# Patient Record
Sex: MALE | Race: OTHER | NOT HISPANIC OR LATINO | Employment: OTHER | URBAN - METROPOLITAN AREA
[De-identification: names, ages, dates, MRNs, and addresses within clinical notes are randomized per-mention and may not be internally consistent; named-entity substitution may affect disease eponyms.]

---

## 2017-07-25 ENCOUNTER — DOCUMENTATION ONLY (OUTPATIENT)
Dept: FAMILY MEDICINE | Facility: CLINIC | Age: 57
End: 2017-07-25

## 2017-07-25 NOTE — PROGRESS NOTES
Pre-Visit Chart Review  For Appointment Scheduled on 7-    There are no preventive care reminders to display for this patient.

## 2017-07-26 ENCOUNTER — HOSPITAL ENCOUNTER (OUTPATIENT)
Dept: RADIOLOGY | Facility: CLINIC | Age: 57
Discharge: HOME | End: 2017-07-26
Attending: NURSE PRACTITIONER
Payer: MEDICARE

## 2017-07-26 ENCOUNTER — OFFICE VISIT (OUTPATIENT)
Dept: FAMILY MEDICINE | Facility: CLINIC | Age: 57
End: 2017-07-26
Payer: MEDICARE

## 2017-07-26 VITALS
WEIGHT: 296.31 LBS | SYSTOLIC BLOOD PRESSURE: 117 MMHG | HEART RATE: 88 BPM | HEIGHT: 69 IN | TEMPERATURE: 99 F | DIASTOLIC BLOOD PRESSURE: 78 MMHG | BODY MASS INDEX: 43.89 KG/M2

## 2017-07-26 DIAGNOSIS — R20.0 NUMBNESS OF LEFT FOOT: ICD-10-CM

## 2017-07-26 DIAGNOSIS — M79.675 PAIN OF TOE OF LEFT FOOT: ICD-10-CM

## 2017-07-26 DIAGNOSIS — R73.9 HYPERGLYCEMIA: Primary | ICD-10-CM

## 2017-07-26 DIAGNOSIS — M79.675 PAIN OF TOE OF LEFT FOOT: Primary | ICD-10-CM

## 2017-07-26 DIAGNOSIS — G62.9 NEUROPATHY: ICD-10-CM

## 2017-07-26 PROCEDURE — 73630 X-RAY EXAM OF FOOT: CPT | Mod: 26,LT,S$GLB, | Performed by: RADIOLOGY

## 2017-07-26 PROCEDURE — 73630 X-RAY EXAM OF FOOT: CPT | Mod: TC,PO,LT

## 2017-07-26 PROCEDURE — 99999 PR PBB SHADOW E&M-EST. PATIENT-LVL III: CPT | Mod: PBBFAC,,, | Performed by: NURSE PRACTITIONER

## 2017-07-26 PROCEDURE — 99203 OFFICE O/P NEW LOW 30 MIN: CPT | Mod: S$PBB,,, | Performed by: NURSE PRACTITIONER

## 2017-07-26 RX ORDER — FLUNISOLIDE 0.25 MG/ML
2 SOLUTION NASAL 2 TIMES DAILY
COMMUNITY
End: 2021-06-01

## 2017-07-26 RX ORDER — MELOXICAM 7.5 MG/1
7.5 TABLET ORAL DAILY PRN
Qty: 30 TABLET | Refills: 2 | Status: SHIPPED | OUTPATIENT
Start: 2017-07-26 | End: 2017-08-10 | Stop reason: SDUPTHER

## 2017-07-26 RX ORDER — OLOPATADINE HYDROCHLORIDE 665 UG/1
1 SPRAY NASAL 2 TIMES DAILY
COMMUNITY
End: 2021-10-29

## 2017-07-26 NOTE — PROGRESS NOTES
Subjective:       Patient ID: Elliott Ayala is a 57 y.o. male.    Chief Complaint: Foot Pain (left foot pain)    Mr. Ayala presents to the clinic today for burning pain to left fifth toe which began 10-14 days ago after walking the dog.  He slipped but did not fall during this walk.  He has noted some numbness to the left lateral foot as well.  No history of diabetes although this is present in family history.        Foot Pain   This is a new problem. The current episode started 1 to 4 weeks ago. The problem occurs constantly. The problem has been waxing and waning. Associated symptoms include arthralgias (left fifth toe) and numbness. Pertinent negatives include no abdominal pain, anorexia, chest pain, chills, coughing, fatigue, fever, joint swelling or weakness. The symptoms are aggravated by walking. He has tried NSAIDs for the symptoms. The treatment provided mild relief.     Review of Systems   Constitutional: Negative for chills, fatigue and fever.   Respiratory: Negative for cough and shortness of breath.    Cardiovascular: Negative for chest pain.   Gastrointestinal: Negative for abdominal pain and anorexia.   Musculoskeletal: Positive for arthralgias (left fifth toe). Negative for joint swelling.   Neurological: Positive for numbness. Negative for dizziness and weakness.       Objective:      Physical Exam   Constitutional: He is oriented to person, place, and time. He appears well-developed and well-nourished. No distress.   HENT:   Head: Normocephalic and atraumatic.   Right Ear: External ear normal.   Left Ear: External ear normal.   Eyes: Conjunctivae and EOM are normal.   Cardiovascular: Normal rate and regular rhythm.    Pulmonary/Chest: Effort normal.   Musculoskeletal: Normal range of motion.   Redness to left fifth toe, no warmth.  Decreased sensation to plantar surface of foot.  Mild tenderness to left lateral foot.   Neurological: He is alert and oriented to person, place, and time.   Skin: Skin  is warm and dry.   Psychiatric: He has a normal mood and affect. His behavior is normal.   Vitals reviewed.          Current Outpatient Prescriptions:     flunisolide 25 mcg, 0.025%, (NASALIDE) 25 mcg (0.025 %) Spry, 2 sprays by Nasal route 2 (two) times daily., Disp: , Rfl:     olopatadine (PATANASE) 0.6 % nasal spray, 1 spray by Each Nare route 2 (two) times daily., Disp: , Rfl:     meloxicam (MOBIC) 7.5 MG tablet, Take 1 tablet (7.5 mg total) by mouth daily as needed for Pain., Disp: 30 tablet, Rfl: 2  Assessment:       1. Pain of toe of left foot    2. Numbness of left foot    3. Neuropathy        Plan:       Pain of toe of left foot  Notify provider for no relief with medication.  Take medication with food.  No OTC NSAID's with Mobic.   Consider gabapentin if no relief with Mobic.  -     meloxicam (MOBIC) 7.5 MG tablet; Take 1 tablet (7.5 mg total) by mouth daily as needed for Pain.  Dispense: 30 tablet; Refill: 2  -     X-Ray Foot Complete Left; Future; Expected date: 07/26/2017  -     Vitamin B12; Future; Expected date: 07/26/2017  -     Folate; Future; Expected date: 07/26/2017  -     Uric acid; Future; Expected date: 07/26/2017  -     Comprehensive metabolic panel; Future; Expected date: 07/26/2017    Numbness of left foot  -     X-Ray Foot Complete Left; Future; Expected date: 07/26/2017  -     Vitamin B12; Future; Expected date: 07/26/2017  -     Folate; Future; Expected date: 07/26/2017  -     Comprehensive metabolic panel; Future; Expected date: 07/26/2017    Neuropathy  No qualifying diagnosis for HgA1c.  Will need to check if glucose elevated on CMP.  -     Vitamin B12; Future; Expected date: 07/26/2017  -     Folate; Future; Expected date: 07/26/2017  -     Comprehensive metabolic panel; Future; Expected date: 07/26/2017

## 2017-07-31 ENCOUNTER — LAB VISIT (OUTPATIENT)
Dept: LAB | Facility: HOSPITAL | Age: 57
End: 2017-07-31
Attending: NURSE PRACTITIONER
Payer: MEDICARE

## 2017-07-31 DIAGNOSIS — R73.9 HYPERGLYCEMIA: ICD-10-CM

## 2017-07-31 LAB
ESTIMATED AVG GLUCOSE: 114 MG/DL
HBA1C MFR BLD HPLC: 5.6 %

## 2017-07-31 PROCEDURE — 83036 HEMOGLOBIN GLYCOSYLATED A1C: CPT

## 2017-07-31 PROCEDURE — 36415 COLL VENOUS BLD VENIPUNCTURE: CPT | Mod: PO

## 2017-08-02 ENCOUNTER — TELEPHONE (OUTPATIENT)
Dept: FAMILY MEDICINE | Facility: CLINIC | Age: 57
End: 2017-08-02

## 2017-08-02 NOTE — TELEPHONE ENCOUNTER
----- Message from [address] sent at 8/1/2017 11:55 AM CDT -----  Contact: [phone]  Patient states he is returning the nurse phone call. Thanks!

## 2017-08-02 NOTE — TELEPHONE ENCOUNTER
----- Message from AYSHA Sheikh sent at 7/31/2017  4:45 PM CDT -----  Hemoglobin A1c is 5.6 consistent with no diabetes.  5.7 is prediabetes.

## 2017-08-08 ENCOUNTER — DOCUMENTATION ONLY (OUTPATIENT)
Dept: FAMILY MEDICINE | Facility: CLINIC | Age: 57
End: 2017-08-08

## 2017-08-08 NOTE — PROGRESS NOTES
Pre-Visit Chart Review  For Appointment Scheduled on 8/10/17.    Health Maintenance Due   Topic Date Due    Hepatitis C Screening  1960    Lipid Panel  1960    TETANUS VACCINE  04/22/1980    Colonoscopy  04/22/2012    Influenza Vaccine  08/01/2017

## 2017-08-10 ENCOUNTER — LAB VISIT (OUTPATIENT)
Dept: LAB | Facility: HOSPITAL | Age: 57
End: 2017-08-10
Attending: FAMILY MEDICINE
Payer: MEDICARE

## 2017-08-10 ENCOUNTER — OFFICE VISIT (OUTPATIENT)
Dept: FAMILY MEDICINE | Facility: CLINIC | Age: 57
End: 2017-08-10
Payer: MEDICARE

## 2017-08-10 VITALS
BODY MASS INDEX: 44.25 KG/M2 | TEMPERATURE: 98 F | HEART RATE: 99 BPM | DIASTOLIC BLOOD PRESSURE: 78 MMHG | HEIGHT: 69 IN | SYSTOLIC BLOOD PRESSURE: 130 MMHG | WEIGHT: 298.75 LBS | RESPIRATION RATE: 18 BRPM

## 2017-08-10 DIAGNOSIS — Z12.11 COLON CANCER SCREENING: ICD-10-CM

## 2017-08-10 DIAGNOSIS — Z00.00 ANNUAL PHYSICAL EXAM: Primary | ICD-10-CM

## 2017-08-10 DIAGNOSIS — M54.50 CHRONIC LEFT-SIDED LOW BACK PAIN WITHOUT SCIATICA: ICD-10-CM

## 2017-08-10 DIAGNOSIS — Z11.4 ENCOUNTER FOR SCREENING FOR HIV: ICD-10-CM

## 2017-08-10 DIAGNOSIS — M25.512 CHRONIC LEFT SHOULDER PAIN: ICD-10-CM

## 2017-08-10 DIAGNOSIS — E66.9 OBESITY: ICD-10-CM

## 2017-08-10 DIAGNOSIS — M79.675 PAIN OF TOE OF LEFT FOOT: ICD-10-CM

## 2017-08-10 DIAGNOSIS — Z00.00 ANNUAL PHYSICAL EXAM: ICD-10-CM

## 2017-08-10 DIAGNOSIS — Z29.9 PREVENTIVE MEASURE: ICD-10-CM

## 2017-08-10 DIAGNOSIS — G89.29 CHRONIC LEFT-SIDED LOW BACK PAIN WITHOUT SCIATICA: ICD-10-CM

## 2017-08-10 DIAGNOSIS — M79.672 LEFT FOOT PAIN: ICD-10-CM

## 2017-08-10 DIAGNOSIS — G89.29 CHRONIC LEFT SHOULDER PAIN: ICD-10-CM

## 2017-08-10 LAB
BASOPHILS # BLD AUTO: 0.03 K/UL
BASOPHILS NFR BLD: 0.5 %
CHOLEST/HDLC SERPL: 5.1 {RATIO}
DIFFERENTIAL METHOD: NORMAL
EOSINOPHIL # BLD AUTO: 0.1 K/UL
EOSINOPHIL NFR BLD: 1.8 %
ERYTHROCYTE [DISTWIDTH] IN BLOOD BY AUTOMATED COUNT: 13.4 %
HCT VFR BLD AUTO: 41.6 %
HDL/CHOLESTEROL RATIO: 19.8 %
HDLC SERPL-MCNC: 182 MG/DL
HDLC SERPL-MCNC: 36 MG/DL
HGB BLD-MCNC: 14.1 G/DL
LDLC SERPL CALC-MCNC: 113 MG/DL
LYMPHOCYTES # BLD AUTO: 2.1 K/UL
LYMPHOCYTES NFR BLD: 32 %
MCH RBC QN AUTO: 27.6 PG
MCHC RBC AUTO-ENTMCNC: 33.9 G/DL
MCV RBC AUTO: 82 FL
MONOCYTES # BLD AUTO: 0.5 K/UL
MONOCYTES NFR BLD: 8.2 %
NEUTROPHILS # BLD AUTO: 3.8 K/UL
NEUTROPHILS NFR BLD: 57.3 %
NONHDLC SERPL-MCNC: 146 MG/DL
PLATELET # BLD AUTO: 264 K/UL
PMV BLD AUTO: 10.7 FL
RBC # BLD AUTO: 5.1 M/UL
TRIGL SERPL-MCNC: 165 MG/DL
WBC # BLD AUTO: 6.56 K/UL

## 2017-08-10 PROCEDURE — 82306 VITAMIN D 25 HYDROXY: CPT

## 2017-08-10 PROCEDURE — 36415 COLL VENOUS BLD VENIPUNCTURE: CPT | Mod: PO

## 2017-08-10 PROCEDURE — 3008F BODY MASS INDEX DOCD: CPT | Mod: ,,, | Performed by: FAMILY MEDICINE

## 2017-08-10 PROCEDURE — 86803 HEPATITIS C AB TEST: CPT

## 2017-08-10 PROCEDURE — 80061 LIPID PANEL: CPT

## 2017-08-10 PROCEDURE — 99999 PR PBB SHADOW E&M-EST. PATIENT-LVL IV: CPT | Mod: PBBFAC,,, | Performed by: FAMILY MEDICINE

## 2017-08-10 PROCEDURE — 85025 COMPLETE CBC W/AUTO DIFF WBC: CPT

## 2017-08-10 PROCEDURE — 99214 OFFICE O/P EST MOD 30 MIN: CPT | Mod: S$PBB,,, | Performed by: FAMILY MEDICINE

## 2017-08-10 PROCEDURE — 86592 SYPHILIS TEST NON-TREP QUAL: CPT

## 2017-08-10 RX ORDER — MELOXICAM 7.5 MG/1
7.5 TABLET ORAL DAILY PRN
Qty: 30 TABLET | Refills: 1 | Status: SHIPPED | OUTPATIENT
Start: 2017-08-10 | End: 2018-08-17 | Stop reason: ALTCHOICE

## 2017-08-10 NOTE — PROGRESS NOTES
"[facility]  [facility]    Subjective:       Patient ID: Elliott Ayala is a 57 y.o. male.    Chief Complaint: Establish Care    HPI57 y.o.male with current medical history of osteoarthritis is here today to establish care.  Patient currently has complaints of left shoulder, hip, and foot pain.  Patient has pain in these areas on a daily basis.  Patient has been taking Mobic which has moderately reduced his symptoms of pain.  Patient symptoms of pain are made worse with weightbearing activities.  Patient has not participated in physical therapy.  Patient is overweight which could be contributing to his  ongoing joint pain.  No further complaints at today's visit.  Review of Systems   Constitutional: Negative for chills, fatigue and fever.   HENT: Negative for congestion, ear pain, postnasal drip, sinus pressure, sneezing and sore throat.    Eyes: Negative for pain.   Respiratory: Negative for cough, shortness of breath and wheezing.    Cardiovascular: Negative for chest pain, palpitations and leg swelling.   Gastrointestinal: Negative for abdominal distention, abdominal pain, constipation, diarrhea, nausea and vomiting.   Genitourinary: Negative for difficulty urinating.   Musculoskeletal: Positive for arthralgias and back pain. Negative for myalgias.   Skin: Negative for rash.   Neurological: Negative for dizziness, seizures, syncope, weakness and numbness.   Psychiatric/Behavioral: Negative for sleep disturbance. The patient is not nervous/anxious.        Objective:      Vitals:    08/10/17 1519   BP: 130/78   BP Location: Right arm   Patient Position: Sitting   BP Method: Medium (Automatic)   Pulse: 99   Resp: 18   Temp: 98.3 °F (36.8 °C)   TempSrc: Oral   Weight: 135.5 kg (298 lb 11.6 oz)   Height: 5' 9" (1.753 m)     Body mass index is 44.11 kg/m².  Physical Exam   Constitutional: He is oriented to person, place, and time. He appears well-developed and well-nourished.   HENT:   Head: Normocephalic " and atraumatic.   Eyes: Conjunctivae and EOM are normal. Pupils are equal, round, and reactive to light.   Neck: Normal range of motion. Neck supple. No JVD present.   Cardiovascular: Normal rate, regular rhythm, normal heart sounds and intact distal pulses.  Exam reveals no gallop and no friction rub.    No murmur heard.  Pulmonary/Chest: Effort normal and breath sounds normal. No respiratory distress. He has no wheezes.   Abdominal: Soft. Bowel sounds are normal. There is no tenderness.   Musculoskeletal: Normal range of motion.   Neurological: He is alert and oriented to person, place, and time. No cranial nerve deficit.   Skin: Skin is warm and dry.   Psychiatric: He has a normal mood and affect. His behavior is normal. Judgment and thought content normal.   Nursing note and vitals reviewed.      Assessment:       1. Annual physical exam    2. Preventive measure    3. Encounter for screening for HIV    4. Body mass index (BMI) of 40.0-44.9 in adult     5. Obesity     6. Chronic left-sided low back pain without sciatica    7. Left foot pain    8. Chronic left shoulder pain    9. Colon cancer screening    10. Pain of toe of left foot        Plan:       Annual physical exam  -     Vitamin D; Future; Expected date: 08/10/2017    Preventive measure  -     Hepatitis C antibody; Future; Expected date: 08/10/2017  -     Lipid panel; Future; Expected date: 08/10/2017  -     RPR; Future; Expected date: 08/10/2017    Encounter for screening for HIV  -     HIV-1 and HIV-2 antibodies; Future; Expected date: 08/10/2017    Body mass index (BMI) of 40.0-44.9 in adult   -     CBC auto differential; Future; Expected date: 08/10/2017  -     Cancel: Comprehensive metabolic panel; Future; Expected date: 08/10/2017  -     Lipid panel; Future; Expected date: 08/10/2017  -     Vitamin D; Future; Expected date: 08/10/2017    Obesity   -     Lipid panel; Future; Expected date: 08/10/2017    Chronic left-sided low back pain without  sciatica  -     Ambulatory Referral to Physical/Occupational Therapy    Left foot pain  -     Ambulatory Referral to Physical/Occupational Therapy    Chronic left shoulder pain  -     Ambulatory Referral to Physical/Occupational Therapy    Colon cancer screening  -     Ambulatory referral to Gastroenterology    Pain of toe of left foot  -     meloxicam (MOBIC) 7.5 MG tablet; Take 1 tablet (7.5 mg total) by mouth daily as needed for Pain.  Dispense: 30 tablet; Refill: 1        Return in about 2 months (around 10/10/2017).  Cameron Hardy MD  [facility]  8/10/2017 3:45 PM

## 2017-08-11 LAB
25(OH)D3+25(OH)D2 SERPL-MCNC: 20 NG/ML
HCV AB SERPL QL IA: NEGATIVE
RPR SER QL: NORMAL

## 2017-08-14 ENCOUNTER — TELEPHONE (OUTPATIENT)
Dept: FAMILY MEDICINE | Facility: CLINIC | Age: 57
End: 2017-08-14

## 2017-08-14 NOTE — TELEPHONE ENCOUNTER
----- Message from LOGAN Carlton sent at 8/14/2017  1:26 PM CDT -----  Please let patient know that vitamin d is a bit low recommend otc vitamin d3 7330-9998 units daily    Cholesterol in acceptable range  HIV, syphilis, an Hep C negative

## 2017-08-14 NOTE — TELEPHONE ENCOUNTER
----- Message from LOGAN Carlton sent at 8/14/2017  1:26 PM CDT -----  Please let patient know that vitamin d is a bit low recommend otc vitamin d3 3071-4398 units daily    Cholesterol in acceptable range  HIV, syphilis, an Hep C negative

## 2017-08-16 DIAGNOSIS — Z12.11 SCREENING FOR COLON CANCER: Primary | ICD-10-CM

## 2017-08-17 ENCOUNTER — CLINICAL SUPPORT (OUTPATIENT)
Dept: REHABILITATION | Facility: HOSPITAL | Age: 57
End: 2017-08-17
Attending: FAMILY MEDICINE
Payer: MEDICARE

## 2017-08-17 DIAGNOSIS — M54.50 CHRONIC LEFT-SIDED LOW BACK PAIN WITHOUT SCIATICA: Primary | ICD-10-CM

## 2017-08-17 DIAGNOSIS — G89.29 CHRONIC LEFT SHOULDER PAIN: ICD-10-CM

## 2017-08-17 DIAGNOSIS — G89.29 CHRONIC LEFT-SIDED LOW BACK PAIN WITHOUT SCIATICA: Primary | ICD-10-CM

## 2017-08-17 DIAGNOSIS — M79.672 LEFT FOOT PAIN: ICD-10-CM

## 2017-08-17 DIAGNOSIS — M25.512 CHRONIC LEFT SHOULDER PAIN: ICD-10-CM

## 2017-08-17 PROCEDURE — G8978 MOBILITY CURRENT STATUS: HCPCS | Mod: CK,PN

## 2017-08-17 PROCEDURE — G8979 MOBILITY GOAL STATUS: HCPCS | Mod: CJ,PN

## 2017-08-17 PROCEDURE — 97161 PT EVAL LOW COMPLEX 20 MIN: CPT | Mod: PN

## 2017-08-17 NOTE — PLAN OF CARE
TIME RECORD    Date: 08/17/2017    Start Time:  1405  Stop Time:  1455    PROCEDURES:    TIMED  Procedure Time Min.    Start:  Stop:     Start:  Stop:     Start:  Stop:     Start:  Stop:          UNTIMED  Procedure Time Min.   EVAL Start:  Stop:     Start:  Stop:      Total Timed Minutes:    Total Timed Units:    Total Untimed Units:  1  Charges Billed/# of units:  1    OUTPATIENT PHYSICAL THERAPY   PATIENT EVALUATION  Onset Date: Pain in left foot started ~ 4 wks ago without trauma.  Primary Diagnosis:   1. Chronic left-sided low back pain without sciatica     2. Left foot pain     3. Chronic left shoulder pain       Treatment Diagnosis:1. Left foot pain.                                        2.LBP.   Past Medical History:   Diagnosis Date    Allergy      Precautions: STD  Prior Therapy: 2013 for LBP.  Medications: Elliott Ayala has a current medication list which includes the following prescription(s): flunisolide 25 mcg (0.025%), meloxicam, and olopatadine.  Nutrition:  Obese  History of Present Illness: Pain in left foot started ~ 4 wks ago without trauma.  Prior Level of Function: Independent  Social History: Not working.  Place of Residence (Steps/Adaptations): [facility] house, 9 steps to enter.  Functional Deficits Leading to Referral/Nature of Injury: Difficulties with ADLs,functional activities, homemaking.  Patient Therapy Goals: Decrease pain,improve function.    Subjective     Elliott Ayala states Pt reports that his chronic LBP is not giving him problems now..    Pain:  Location: left foot.  Description: Aching, Tingling, Numb and Variable  Activities Which Increase Pain: Standing, Bending, Extension and Flexing  Activities Which Decrease Pain: relaxation, pain medication, lying down and rest  Pain Scale: 3/10 at best 3/10 now  8/10 at worst    Objective     Posture: Round shoulders, flexed hips, ER LLE, pes planus LT>RT.  Palpation: Lateral aspect of left foot,5th metatarsal area tender.  Sensation:  Intact.  DTRs:  Range of Motion/Strength: ROM of left ankle,foot is WNL/WFL.Strength is grossly 3+/5 in all planes.   ROM of LS is WFL. Strength is grossly 3+/5 in all planes.    Flexibility: Both HS tight.  Gait: Without AD  Analysis: SBA - antalgic,guarded.  Bed Mobility:Independent  Transfers: Independent  Special Tests: Ant drawer test neg, FIG 8 LT57.8, RT;58.0 cm. Slump test neg. SLR LT;90,RT;90 neg bilaterally.  Other: LEFS 44/80, OSWESTRY 16/50.  Treatment: EVAL    Assessment       Initial Assessment (Pertinent finding, problem list and factors affecting outcome): Pt presents strength deficits of left foot/ankle and LS,gait abnormality,decreased function due to left foot pain, LBP and above listed deficits.   Rehab Potiential: good    Short Term Goals (2 Weeks): 1.Decrease pain x 1 grade. 2.Start HEP.  Long Term Goals (4 Weeks): 1.Pain 0-4/10. 2.Independent HEP. 3.Strength 5/5. 4.Gait WNL. 5.LEFS 60/80. 6.Pt will be able to tolerate 1 hr of WB on LLE.7.Restore previous TAWNYA.    Plan     Certification Period: 8/17/17 to 9/17/17.  Recommended Treatment Plan: 2 times per week for 4 weeks: Electrical Stimulation PRN, Gait Training, Group Therapy, Manual Therapy, Moist Heat/ Ice, Patient Education, Therapeutic Activites, Therapeutic Exercise and Ultrasound/Phonophoresis  Other Recommendations: Dry needling PRN.      Therapist: Eduin Maddox, PT    I CERTIFY THE NEED FOR THESE SERVICES FURNISHED UNDER THIS PLAN OF TREATMENT AND WHILE UNDER MY CARE    Physician's comments: ________________________________________________________________________________________________________________________________________________      Physician's Name: ___________________________________

## 2017-08-21 ENCOUNTER — CLINICAL SUPPORT (OUTPATIENT)
Dept: REHABILITATION | Facility: HOSPITAL | Age: 57
End: 2017-08-21
Attending: FAMILY MEDICINE
Payer: MEDICARE

## 2017-08-21 DIAGNOSIS — M79.672 LEFT FOOT PAIN: Primary | ICD-10-CM

## 2017-08-21 PROCEDURE — 97035 APP MDLTY 1+ULTRASOUND EA 15: CPT | Mod: PN

## 2017-08-21 PROCEDURE — 97110 THERAPEUTIC EXERCISES: CPT | Mod: PN

## 2017-08-21 PROCEDURE — 97140 MANUAL THERAPY 1/> REGIONS: CPT | Mod: PN

## 2017-08-21 NOTE — PROGRESS NOTES
"Name: Elliott SIMMONS [facility] Number: [id number]  Date of Treatment: 08/21/2017   Diagnosis:   Encounter Diagnosis   Name Primary?    Left foot pain Yes       Time in: 1400  Time Out: 1455  Total Treatment Time: 55        Subjective:    Elliott SIMMONS reports pain is mostly in his L foot but he feels like he has a knot in his low back.  Patient reports their pain to be 3/10 on a 0-10 scale with 0 being no pain and 10 being the worst pain imaginable.    Objective    Patient received individual therapy to increase strength, endurance, ROM, flexibility, posture and core stabilization with 0 patients with activities as follows:     Elliott SIMMONS received therapeutic exercises to develop strength, endurance, ROM, flexibility, posture and core stabilization for 37 minutes including:     nustep x 10 min L-3  gastroc stretch 3 x 30 sec  Soleus stretch 3 x 30 sec  hamstring stretch 3 x 30 sec  Piriformis stretch 3 x 30 sec  Ankle 4-way GTB x 30 reps        Elliott SIMMONS received the following manual therapy techniques: Soft tissue Mobilization were applied to the: L plantar/lateral foot for 10 minutes.     US x 8 min to L lateral foot @ 100% duty power and 1.25 w/cm2 to decrease pain and inflammation        Pt demo good understanding of the education provided. Elliott SIMMONS demonstrated good return demonstration of activities.     Assessment:   Increased tenderness in l foot with palpation.  Pt reports numbness and "shocking" sensation with manual PT.    Pt will continue to benefit from skilled PT intervention. Medical Necessity is demonstrated by:  Pain limits function of effected part for some activities, Unable to participate fully in daily activities, Requires skilled supervision to complete and progress HEP and Weakness.    Patient is making good progress towards established goals.          Plan:  Continue with established Plan of Care towards PT goals.   "

## 2017-08-23 ENCOUNTER — CLINICAL SUPPORT (OUTPATIENT)
Dept: REHABILITATION | Facility: HOSPITAL | Age: 57
End: 2017-08-23
Attending: FAMILY MEDICINE
Payer: MEDICARE

## 2017-08-23 DIAGNOSIS — M79.672 LEFT FOOT PAIN: Primary | ICD-10-CM

## 2017-08-23 PROCEDURE — 97035 APP MDLTY 1+ULTRASOUND EA 15: CPT | Mod: PN

## 2017-08-23 PROCEDURE — 97140 MANUAL THERAPY 1/> REGIONS: CPT | Mod: PN

## 2017-08-23 PROCEDURE — 97110 THERAPEUTIC EXERCISES: CPT | Mod: PN

## 2017-08-23 NOTE — PROGRESS NOTES
"Name: Elliott SIMMONS [facility] Number: [id number]  Date of Treatment: 08/23/2017   Diagnosis:   Encounter Diagnosis   Name Primary?    Left foot pain Yes       Time in: 1058  Time Out: 1158  Total Treatment Time: 60      Subjective:    Elliott SIMMONS reports "I always have back pain."  Patient reports their pain to be 2/10 in foot on a 0-10 scale with 0 being no pain and 10 being the worst pain imaginable.    Objective    Patient received individual therapy to increase strength, ROM, flexibility and posture with activities as follows:     Elliott SIMMONS received therapeutic exercises to develop strength, ROM, flexibility and posture for 40 minutes including:     Nustep level 4 x 10 minutes  Standing gastroc stretch 3 x 30 sec B  Soleus stretch 3 x 30 sec B  Ankle 4 way GTB x 30 each  Hamstring stretch 3 x 30 sec B  Shuttle:  62# B, 43# L     US 1.5 w/cm2 to lateral/planter surface of L foot x 10 minutes total    Elliott SIMMONS received the following manual therapy techniques: Joint mobilizations MTP's, met head region, tuberosity of 5th metatarsal, and Soft tissue Mobilization to the same region were applied to the: L lateral and planter surface for 10 minutes.     Pt demo good understanding of the education provided. Elliott SIMMONS demonstrated good return demonstration of activities.     Assessment:     Pt reported feeling "looser" upon completion of MT.  Painful regions at met heads and lateral boarder noted during MT.  Pt will continue to benefit from skilled PT intervention. Medical Necessity is demonstrated by:  Pain limits function of effected part for some activities, Unable to participate fully in daily activities, Requires skilled supervision to complete and progress HEP and Weakness.    Patient is making good progress towards established goals.      Plan:  Continue with established Plan of Care towards PT goals.   "

## 2017-08-28 ENCOUNTER — CLINICAL SUPPORT (OUTPATIENT)
Dept: REHABILITATION | Facility: HOSPITAL | Age: 57
End: 2017-08-28
Attending: FAMILY MEDICINE
Payer: MEDICARE

## 2017-08-28 DIAGNOSIS — M79.672 LEFT FOOT PAIN: ICD-10-CM

## 2017-08-28 PROCEDURE — 97140 MANUAL THERAPY 1/> REGIONS: CPT | Mod: PN

## 2017-08-28 PROCEDURE — 97110 THERAPEUTIC EXERCISES: CPT | Mod: PN

## 2017-08-28 NOTE — PROGRESS NOTES
Name: Elliott SIMMONS [facility] Number: [id number]  Date of Treatment: 08/28/2017   Diagnosis:   Encounter Diagnosis   Name Primary?    Left foot pain        Time in: 0855  Time Out: 0955  Total Treatment Time: 60      Subjective:    Elliott SIMMONS reports pain is same.  Pt reported he was sore after last visit, but foot pain went back to what is was prior to visit.  Patient reports their pain to be 2/10 on a 0-10 scale with 0 being no pain and 10 being the worst pain imaginable.    Objective    Patient received individual therapy to increase strength, ROM and flexibility with activities as follows:     Elliott SIMMONS received therapeutic exercises to develop strength, ROM and flexibility for 45 minutes including:     Nustep level 4 x 10 minutes  Standing gastroc stretch 3 x 30 sec B  Soleus stretch 3 x 30 sec B  Hamstring stretch 3 x 30 sec B  Shuttle:  75# B, 62# L   LTR x 20 B  Lateral trunk stretch, L 3 x 15 sec, pt in R SL    Elliott SIMMONS received the following manual therapy techniques: MFR with therapy bar, ball Soft tissue Mobilization were applied to the: L LB, L lateral foot region for 15 minutes.     Written Home Exercises Provided: cont HEP  Pt demo good understanding of the education provided. Elliott SIMMONS demonstrated good return demonstration of activities.     Assessment:     Pt with tight musculature L LB region.  Pt tends to ambulate on lateral foot.  Pt will continue to benefit from skilled PT intervention. Medical Necessity is demonstrated by:  Pain limits function of effected part for some activities, Unable to participate fully in daily activities, Requires skilled supervision to complete and progress HEP and Weakness.    Patient is making good progress towards established goals.      Plan:  Continue with established Plan of Care towards PT goals.

## 2017-08-31 ENCOUNTER — CLINICAL SUPPORT (OUTPATIENT)
Dept: REHABILITATION | Facility: HOSPITAL | Age: 57
End: 2017-08-31
Attending: FAMILY MEDICINE
Payer: MEDICARE

## 2017-08-31 DIAGNOSIS — M79.672 LEFT FOOT PAIN: Primary | ICD-10-CM

## 2017-08-31 PROCEDURE — 97140 MANUAL THERAPY 1/> REGIONS: CPT | Mod: PN

## 2017-08-31 PROCEDURE — 97110 THERAPEUTIC EXERCISES: CPT | Mod: PN

## 2017-08-31 NOTE — PROGRESS NOTES
08/31/17 1500   Ankle Exercises   Standing Dorsiflexion Stretch(Gastroc) Reps/Sets/Hold Time 3X30   Additional Exercises   Additional Exercise NUSTEP X 12' L4

## 2017-08-31 NOTE — PROGRESS NOTES
TIME RECORD    Date:  08/31/2017    Start Time:  0900  Stop Time:  0950    PROCEDURES:    TIMED  Procedure Time Min.   TE Start:0900  Stop:0915 15   MTT/DN Start:0925  Stop:0950 25    Start:  Stop:     Start:  Stop:          UNTIMED  Procedure Time Min.    Start:  Stop:     Start:  Stop:      Total Timed Minutes: 40  Total Timed Units:  3  Total Untimed Units:  0  Charges Billed/# of units:  3      Progress/Current Status    Subjective:     Patient ID: Elliott Ayala is a 57 y.o. male.  Diagnosis: Left foot pain.  Pain: 4 /10      Objective:     TE X 15' f/b MTT/DN to lt PF, abductor digiti min.flexor hallucis brevis.with IMS X 8'.    Assessment:     Tolerated DN well.    Patient Education/Response:     Expect to be sore tonight.    Plans and Goals:     Cont per POC.

## 2017-09-06 ENCOUNTER — OFFICE VISIT (OUTPATIENT)
Dept: FAMILY MEDICINE | Facility: CLINIC | Age: 57
End: 2017-09-06
Payer: MEDICARE

## 2017-09-06 VITALS
WEIGHT: 298.06 LBS | HEIGHT: 68 IN | DIASTOLIC BLOOD PRESSURE: 76 MMHG | HEART RATE: 76 BPM | BODY MASS INDEX: 45.17 KG/M2 | SYSTOLIC BLOOD PRESSURE: 124 MMHG | TEMPERATURE: 98 F | RESPIRATION RATE: 16 BRPM

## 2017-09-06 DIAGNOSIS — H10.9 CONJUNCTIVITIS OF LEFT EYE, UNSPECIFIED CONJUNCTIVITIS TYPE: Primary | ICD-10-CM

## 2017-09-06 DIAGNOSIS — H01.004 BLEPHARITIS OF LEFT UPPER EYELID, UNSPECIFIED TYPE: ICD-10-CM

## 2017-09-06 PROCEDURE — 99213 OFFICE O/P EST LOW 20 MIN: CPT | Mod: PBBFAC,PO | Performed by: FAMILY MEDICINE

## 2017-09-06 PROCEDURE — 99999 PR PBB SHADOW E&M-EST. PATIENT-LVL III: CPT | Mod: PBBFAC,,, | Performed by: FAMILY MEDICINE

## 2017-09-06 PROCEDURE — 99214 OFFICE O/P EST MOD 30 MIN: CPT | Mod: S$PBB,,, | Performed by: FAMILY MEDICINE

## 2017-09-06 RX ORDER — GENTAMICIN SULFATE 0.3 %
0.5 OINTMENT (GRAM) OPHTHALMIC (EYE) EVERY 4 HOURS
Qty: 3.5 G | Refills: 0 | Status: SHIPPED | OUTPATIENT
Start: 2017-09-06 | End: 2017-11-09

## 2017-09-06 RX ORDER — CLINDAMYCIN HYDROCHLORIDE 300 MG/1
300 CAPSULE ORAL 4 TIMES DAILY
Qty: 40 CAPSULE | Refills: 0 | Status: SHIPPED | OUTPATIENT
Start: 2017-09-06 | End: 2017-09-16

## 2017-09-06 NOTE — PROGRESS NOTES
Subjective:       Patient ID: Elliott Ayala is a 57 y.o. male.    Chief Complaint: Belepharitis (left eye started swelling last Friday, c/o swelling, itching and draining)    Swelling around eye; sore, swollen eyelids and in front of left ear.      Eye Injury    The left eye is affected. This is a new problem. The current episode started in the past 7 days. The problem has been gradually worsening. There was no injury mechanism. The pain is mild. Associated symptoms include an eye discharge and eye redness. Pertinent negatives include no double vision, fever, foreign body sensation or nausea.     Review of Systems   Constitutional: Negative for fever.   Eyes: Positive for discharge and redness. Negative for double vision.   Respiratory: Negative for shortness of breath.    Cardiovascular: Negative for chest pain.   Gastrointestinal: Negative for abdominal pain and nausea.   Skin: Negative for rash.   Neurological: Negative for numbness.   All other systems reviewed and are negative.      Objective:      Physical Exam   Constitutional: He appears well-developed. No distress.   HENT:   Mouth/Throat: Oropharynx is clear and moist.   Eyes: Left eye exhibits discharge. Left conjunctiva is injected.   Left upper and lower lid edema,  No redness or heat.   Neck: Neck supple.   Cardiovascular: Normal rate and regular rhythm.    No murmur heard.  Pulmonary/Chest: Effort normal and breath sounds normal.   Lymphadenopathy:        Head (left side): Preauricular adenopathy present.     He has no cervical adenopathy.   Skin: Skin is warm and dry.       Assessment:       1. Conjunctivitis of left eye, unspecified conjunctivitis type    2. Blepharitis of left upper eyelid, unspecified type        Plan:         Elliott SIMMONS was seen today for belepharitis.    Diagnoses and all orders for this visit:    Conjunctivitis of left eye, unspecified conjunctivitis type  -     gentamicin (GARAMYCIN) 0.3 % (3 mg/gram) ophthalmic ointment; Place  0.5 inches into the left eye every 4 (four) hours.    Blepharitis of left upper eyelid, unspecified type  -     clindamycin (CLEOCIN) 300 MG capsule; Take 1 capsule (300 mg total) by mouth 4 (four) times daily.

## 2017-09-07 ENCOUNTER — CLINICAL SUPPORT (OUTPATIENT)
Dept: REHABILITATION | Facility: HOSPITAL | Age: 57
End: 2017-09-07
Attending: FAMILY MEDICINE
Payer: MEDICARE

## 2017-09-07 ENCOUNTER — TELEPHONE (OUTPATIENT)
Dept: GASTROENTEROLOGY | Facility: CLINIC | Age: 57
End: 2017-09-07

## 2017-09-07 DIAGNOSIS — M79.672 LEFT FOOT PAIN: ICD-10-CM

## 2017-09-07 PROCEDURE — 97150 GROUP THERAPEUTIC PROCEDURES: CPT | Mod: PN

## 2017-09-07 PROCEDURE — 97010 HOT OR COLD PACKS THERAPY: CPT | Mod: PN

## 2017-09-07 NOTE — TELEPHONE ENCOUNTER
----- Message from Angelia Ng sent at 9/7/2017  1:11 PM CDT -----  Contact: self  Needs to reschedule colonoscopy due to problems with eye. Please call back at [phone] (home)

## 2017-09-07 NOTE — PROGRESS NOTES
"Name: Elliott SIMMONS [facility] Number: [id number]  Date of Treatment: 09/07/2017   Diagnosis:   Encounter Diagnosis   Name Primary?    Left foot pain        Time in: 0902  Time Out: 1000  Total Treatment Time: 58  Group Time: 48      Subjective:    Elliott SIMMONS reports mowed grass yesterday for an hour and a half. Foot always hurts but not more than usual during mowing.  Reports L foot pain 3/10. L LBP 5/10, reporting these are "not pains" but "soreness from mowing yesterday.     Objective:    Patient received group therapy x 48 minutes to increase strength, endurance, ROM, flexibility, posture and core stabilization with activities as follows:     Elliott SIMMONS received therapeutic exercises to develop strength, endurance, ROM, flexibility, posture and core stabilization for 48 minutes including:     NuStep L5 10' LE's only  Seated hamstring stretches 3/30s L/R  Seated piriformis stretches 3/30s L/R  Standing gastroc stretches 3/30s B over 1/2 foam roll in // bars  Standing HR B 10/3 in // bars  Seated ankle 4 way L GTB 10/3  Supine TrA sets 10/2 reps-stopped 2* discomfort L LB  LTR L/R 10 reps-stopped 2* L LBP  At this point, pt insisted pain was his usual soreness after mowing but notable guarding position with supine position and stating pain will not change with alternate position with wedge, so stopped ex's for today    MHP x 10' to LB in supine hooklying for pain purposes after being cleared of contraindications.     Continue HEP daily.    Pt demo good understanding of the education provided. Elliott SIMMONS demonstrated good return demonstration of activities.     Assessment:     Increased discomfort with ex's today, improved after MHP.     Pt will continue to benefit from skilled PT intervention. Medical Necessity is demonstrated by:  Requires skilled supervision to complete and progress HEP and Weakness.    Patient is making good progress towards established goals.    Plan:    Continue with established Plan of Care towards PT " goals.

## 2017-09-08 ENCOUNTER — OFFICE VISIT (OUTPATIENT)
Dept: FAMILY MEDICINE | Facility: CLINIC | Age: 57
End: 2017-09-08
Payer: MEDICARE

## 2017-09-08 VITALS
DIASTOLIC BLOOD PRESSURE: 77 MMHG | WEIGHT: 296.31 LBS | HEART RATE: 90 BPM | OXYGEN SATURATION: 96 % | TEMPERATURE: 99 F | HEIGHT: 68 IN | RESPIRATION RATE: 19 BRPM | SYSTOLIC BLOOD PRESSURE: 118 MMHG | BODY MASS INDEX: 44.91 KG/M2

## 2017-09-08 DIAGNOSIS — H01.006 BLEPHARITIS OF EYELID OF LEFT EYE, UNSPECIFIED EYELID, UNSPECIFIED TYPE: Primary | ICD-10-CM

## 2017-09-08 PROCEDURE — 99999 PR PBB SHADOW E&M-EST. PATIENT-LVL III: CPT | Mod: PBBFAC,,, | Performed by: FAMILY MEDICINE

## 2017-09-08 PROCEDURE — 99213 OFFICE O/P EST LOW 20 MIN: CPT | Mod: PBBFAC,PO | Performed by: FAMILY MEDICINE

## 2017-09-08 PROCEDURE — 99213 OFFICE O/P EST LOW 20 MIN: CPT | Mod: S$PBB,,, | Performed by: FAMILY MEDICINE

## 2017-09-08 NOTE — PROGRESS NOTES
Subjective:       Patient ID: Elliott Ayala is a 57 y.o. male.    Chief Complaint: Follow-up    Overall improved; no systemic symptoms of fever or nausea or focal eye or neuro deficits.  Taking meds as Rx'ed.      Review of Systems   Constitutional: Negative for fever.   Respiratory: Negative for shortness of breath.    Cardiovascular: Negative for chest pain.   Gastrointestinal: Negative for abdominal pain and nausea.   Skin: Negative for rash.   Neurological: Negative for numbness.   All other systems reviewed and are negative.      Objective:      Physical Exam   Constitutional: He appears well-developed. No distress.   Eyes: EOM are normal. Left eye exhibits no exudate. Left conjunctiva is injected.   Lid swelling approx 50% improved.   Cardiovascular: Normal rate and regular rhythm.    No murmur heard.  Pulmonary/Chest: Effort normal and breath sounds normal.   Lymphadenopathy:        Head (left side): Preauricular (smaller than previous OV) adenopathy present.       Assessment:       No diagnosis found.    Plan:         Elliott SIMMONS was seen today for follow-up.    Diagnoses and all orders for this visit:    Blepharitis of eyelid of left eye, unspecified eyelid, unspecified type    Complete meds; RTC prn any worsening or other concerns.

## 2017-09-13 ENCOUNTER — CLINICAL SUPPORT (OUTPATIENT)
Dept: REHABILITATION | Facility: HOSPITAL | Age: 57
End: 2017-09-13
Attending: FAMILY MEDICINE
Payer: MEDICARE

## 2017-09-13 DIAGNOSIS — M79.672 LEFT FOOT PAIN: Primary | ICD-10-CM

## 2017-09-13 PROCEDURE — G8979 MOBILITY GOAL STATUS: HCPCS | Mod: CJ,PN

## 2017-09-13 PROCEDURE — G8980 MOBILITY D/C STATUS: HCPCS | Mod: CJ,PN

## 2017-09-13 PROCEDURE — 97110 THERAPEUTIC EXERCISES: CPT | Mod: PN

## 2017-09-13 NOTE — PROGRESS NOTES
TIME RECORD    Date: 9/13/17    Start Time:  1000  Stop Time:  1055    PROCEDURES:    TIMED  Procedure Time Min.   TE Start:1015  Stop:1055 40    Start:  Stop:     Start:  Stop:     Start:  Stop:          UNTIMED  Procedure Time Min.   RE-EVAL Start:  Stop:     Start:  Stop:      Total Timed Minutes:  40  Total Timed Units:  3  Total Untimed Units:  0  Charges Billed/# of units:  3[facility] OUTPATIENT DISCHARGE SUMMARY  Physical Therapy      Name:  Elliott Ayala  Date:  9/13/17  Date of Evaluation:  8/17/17  Physician:  BRIANNE.  Total # Of Visits:  7  Cancelled:    No Shows:    Diagnosis:    1. Left foot pain         Physical/Functional Status:  At time of discharge, patient was able to ROM WFL/WNL.  Strength 5/5.Pain ~ 2-3/10.  Gait WNL.  LEFS 55/80 20-40% IMPAIRED.  The patient is to be discharged from our Therapy service for the following reason(s):  Patient has met all of his/her goals    Degree of Goal Achievement:  Patient has met all goals    Patient Education:  HEP issued.    Discharge Plan:  Home Program:  3 x wk.

## 2017-09-13 NOTE — PROGRESS NOTES
09/13/17 1600   Ankle Exercises   Double Leg Calf Raises Reps/Sets/Weight 30   Standing Dorsiflexion Stretch(Gastroc) Reps/Sets/Hold Time 3x30   Knee Exercises   Frontal Squats Reps/Sets/Weight 30   Tg/Shuttle/Reformer Squats Reps/Sets/Weight/Level 50# 6'   Additional Exercises   Additional Exercise nustep 12'   Additional Exercise ANKLE X 4 BTB 30   Additional Exercise HEP 10'.

## 2017-11-03 ENCOUNTER — DOCUMENTATION ONLY (OUTPATIENT)
Dept: FAMILY MEDICINE | Facility: CLINIC | Age: 57
End: 2017-11-03

## 2017-11-03 NOTE — PROGRESS NOTES
Pre-Visit Chart Review  For Appointment Scheduled on 11/9/17.    Health Maintenance Due   Topic Date Due    TETANUS VACCINE  04/22/1980    Colonoscopy  04/22/2012    Influenza Vaccine  08/01/2017

## 2017-11-06 DIAGNOSIS — Z12.11 COLON CANCER SCREENING: Primary | ICD-10-CM

## 2017-11-09 ENCOUNTER — OFFICE VISIT (OUTPATIENT)
Dept: FAMILY MEDICINE | Facility: CLINIC | Age: 57
End: 2017-11-09
Payer: MEDICARE

## 2017-11-09 ENCOUNTER — LAB VISIT (OUTPATIENT)
Dept: LAB | Facility: HOSPITAL | Age: 57
End: 2017-11-09
Attending: FAMILY MEDICINE
Payer: MEDICARE

## 2017-11-09 VITALS
RESPIRATION RATE: 20 BRPM | BODY MASS INDEX: 45.85 KG/M2 | DIASTOLIC BLOOD PRESSURE: 78 MMHG | HEART RATE: 79 BPM | WEIGHT: 302.5 LBS | HEIGHT: 68 IN | SYSTOLIC BLOOD PRESSURE: 112 MMHG | TEMPERATURE: 99 F

## 2017-11-09 DIAGNOSIS — M62.838 MUSCLE SPASM: ICD-10-CM

## 2017-11-09 DIAGNOSIS — Z12.5 ENCOUNTER FOR PROSTATE CANCER SCREENING: ICD-10-CM

## 2017-11-09 DIAGNOSIS — E55.9 VITAMIN D DEFICIENCY: ICD-10-CM

## 2017-11-09 DIAGNOSIS — H01.006 BLEPHARITIS OF EYELID OF LEFT EYE, UNSPECIFIED EYELID, UNSPECIFIED TYPE: Primary | ICD-10-CM

## 2017-11-09 LAB — COMPLEXED PSA SERPL-MCNC: 2.5 NG/ML

## 2017-11-09 PROCEDURE — 84153 ASSAY OF PSA TOTAL: CPT

## 2017-11-09 PROCEDURE — 36415 COLL VENOUS BLD VENIPUNCTURE: CPT | Mod: PO

## 2017-11-09 PROCEDURE — 99213 OFFICE O/P EST LOW 20 MIN: CPT | Mod: PBBFAC,PO | Performed by: FAMILY MEDICINE

## 2017-11-09 PROCEDURE — 99214 OFFICE O/P EST MOD 30 MIN: CPT | Mod: S$PBB,,, | Performed by: FAMILY MEDICINE

## 2017-11-09 PROCEDURE — 99999 PR PBB SHADOW E&M-EST. PATIENT-LVL III: CPT | Mod: PBBFAC,,, | Performed by: FAMILY MEDICINE

## 2017-11-09 RX ORDER — CYCLOBENZAPRINE HCL 5 MG
5 TABLET ORAL 3 TIMES DAILY PRN
Qty: 30 TABLET | Refills: 0 | Status: SHIPPED | OUTPATIENT
Start: 2017-11-09 | End: 2017-11-19

## 2017-11-09 NOTE — PROGRESS NOTES
"[facility]  Progress Note    Subjective:       Patient ID: Elliott Ayala is a 57 y.o. male.    Chief Complaint: eye discomfort     HPI57 y.o.male is here today for three-month follow-up visit.  She is currently complaining of left eye discomfort.  Patient was diagnosed with Blepharitis.  Patient has been treated with antibiotics.  Patient feels that his symptoms have not resolved.  Patient has not yet been evaluated by ophthalmology.  Patient is requesting PSA testing today.  No further complaints at today's visit.  Review of Systems   Constitutional: Negative for chills, fatigue and fever.   HENT: Negative for congestion, ear pain, postnasal drip, sinus pressure, sneezing and sore throat.    Eyes: Negative for pain.   Respiratory: Negative for cough, shortness of breath and wheezing.    Cardiovascular: Negative for chest pain, palpitations and leg swelling.   Gastrointestinal: Negative for abdominal distention, abdominal pain, constipation, diarrhea, nausea and vomiting.   Genitourinary: Negative for difficulty urinating.   Musculoskeletal: Negative for back pain and myalgias.   Skin: Negative for rash.   Neurological: Negative for dizziness, seizures, syncope, weakness and numbness.   Psychiatric/Behavioral: Negative for sleep disturbance. The patient is not nervous/anxious.        Objective:      Vitals:    11/09/17 0952   BP: 112/78   BP Location: Right arm   Patient Position: Sitting   BP Method: Large (Automatic)   Pulse: 79   Resp: 20   Temp: 98.5 °F (36.9 °C)   TempSrc: Oral   Weight: (!) 137.2 kg (302 lb 7.5 oz)   Height: 5' 8" (1.727 m)     Body mass index is 45.99 kg/m².  Physical Exam   Constitutional: He is oriented to person, place, and time. He appears well-developed and well-nourished. No distress.   HENT:   Head: Normocephalic and atraumatic.   Cardiovascular: Normal rate, regular rhythm, normal heart sounds and intact distal pulses.  Exam reveals no gallop and no friction rub.    No " murmur heard.  Pulmonary/Chest: Effort normal and breath sounds normal. No respiratory distress. He has no rales.   Abdominal: Soft. He exhibits no distension and no mass. There is no rebound and no guarding. No hernia.   Musculoskeletal: Normal range of motion.   Neurological: He is alert and oriented to person, place, and time.   Skin: Skin is warm.   Psychiatric: He has a normal mood and affect. His behavior is normal. Judgment and thought content normal.   Vitals reviewed.      Assessment:       1. Blepharitis of eyelid of left eye, unspecified eyelid, unspecified type    2. Vitamin D deficiency    3. Muscle spasm    4. Encounter for prostate cancer screening        Plan:       Blepharitis of eyelid of left eye, unspecified eyelid, unspecified type  -     Ambulatory referral to Ophthalmology    Vitamin D deficiency        -  Stable continue to monitor     Muscle spasm  -     cyclobenzaprine (FLEXERIL) 5 MG tablet; Take 1 tablet (5 mg total) by mouth 3 (three) times daily as needed for Muscle spasms.  Dispense: 30 tablet; Refill: 0    Encounter for prostate cancer screening  -     PSA, Screening; Future; Expected date: 11/09/2017      Return in about 3 months (around 2/9/2018).  Cameron Hardy MD  [facility]  11/9/2017 10:42 AM

## 2017-11-13 ENCOUNTER — TELEPHONE (OUTPATIENT)
Dept: FAMILY MEDICINE | Facility: CLINIC | Age: 57
End: 2017-11-13

## 2017-11-17 ENCOUNTER — OFFICE VISIT (OUTPATIENT)
Dept: OPTOMETRY | Facility: CLINIC | Age: 57
End: 2017-11-17
Payer: MEDICARE

## 2017-11-17 DIAGNOSIS — H02.054 TRICHIASIS OF LEFT UPPER EYELID: ICD-10-CM

## 2017-11-17 DIAGNOSIS — H57.89 EYE IRRITATION: Primary | ICD-10-CM

## 2017-11-17 PROCEDURE — 92002 INTRM OPH EXAM NEW PATIENT: CPT | Mod: S$PBB,25,, | Performed by: OPTOMETRIST

## 2017-11-17 PROCEDURE — 99212 OFFICE O/P EST SF 10 MIN: CPT | Mod: PBBFAC,PO,25 | Performed by: OPTOMETRIST

## 2017-11-17 PROCEDURE — 99999 PR PBB SHADOW E&M-EST. PATIENT-LVL II: CPT | Mod: PBBFAC,,, | Performed by: OPTOMETRIST

## 2017-11-17 PROCEDURE — 67820 REVISE EYELASHES: CPT | Mod: PBBFAC,PO | Performed by: OPTOMETRIST

## 2017-11-17 PROCEDURE — 67820 REVISE EYELASHES: CPT | Mod: S$PBB,LT,, | Performed by: OPTOMETRIST

## 2017-11-17 NOTE — LETTER
November 17, 2017      Cameron Hardy MD  [address]  [place] [address]           [facility] [facility]  [address] [place] [address] Drive [address]  [place] [address]  Phone: [phone]          Patient: Elliott Ayala   MR Number: [id number]   YOB: 1960   Date of Visit: 11/17/2017       Dear Dr. Cameron Hardy:    Thank you for referring Elliott Ayala to me for evaluation. Attached you will find relevant portions of my assessment and plan of care.    If you have questions, please do not hesitate to call me. I look forward to following Elliott Ayala along with you.    Sincerely,    Roderick Wilde, OD    Enclosure  CC:  No Recipients    If you would like to receive this communication electronically, please contact [email or web address] or [phone] to request more information on [employer] Link access.    For providers and/or their staff who would like to refer a patient to [facility], please contact us through our one-stop-shop provider referral line, [facility], at [phone].    If you feel you have received this communication in error or would no longer like to receive these types of communications, please e-mail [email or web address]

## 2017-11-17 NOTE — PROGRESS NOTES
HPI     Presenting Complaint: Pt here today because he has recurrent mucus in   left eye. Pt states had eye infection left eye one month ago and since   then left eye has tears and mucus. Pt has more than usual sleep in his   eyes when he wakes up in the morning. No pain. No itch. Pt states   sometimes eyes will twitch when reading. 3/10 aggravating.    (+) Pt states uses glasses for reading.    Ophthalmic medication / drops None    DLE: 1 month ago    (-) headaches  (-) diplopia   (-) flashes / (-) floaters      Last edited by Roderick Wilde, OD on 11/17/2017  2:55 PM. (History)            Assessment /Plan     For exam results, see Encounter Report.    Eye irritation    Trichiasis of left upper eyelid      Trichiasis upper left lid. Discussed findings and treatment options. Instilled Proparacaine: 1 gt @ 3:07 PM. Epilated 1 cilia in office without complications. Pt reports lid surgery as child for entropion. Discussed options, if re-occurring, will discuss possible cautery and/or lid consult.     JESSICA OU, pt sleeps with CPAP machine. Discussed options, pt denies steroid drops, prefers OTC. Start OTC Systane balance qid  OTC Refresh PM Ointment at bedtime, caution blurry vision with ointment use. Discussed chronicity of JESSICA. RTC if symptoms not alleviated by continued use of artificial tears.     RTC if worsening or no alleviation, f/u as directed by primary eye physician at [facility].

## 2018-03-21 ENCOUNTER — OFFICE VISIT (OUTPATIENT)
Dept: FAMILY MEDICINE | Facility: CLINIC | Age: 58
End: 2018-03-21
Payer: MEDICARE

## 2018-03-21 VITALS
TEMPERATURE: 99 F | SYSTOLIC BLOOD PRESSURE: 114 MMHG | HEART RATE: 80 BPM | DIASTOLIC BLOOD PRESSURE: 80 MMHG | BODY MASS INDEX: 47.69 KG/M2 | WEIGHT: 303.81 LBS | HEIGHT: 67 IN

## 2018-03-21 DIAGNOSIS — M62.838 MUSCLE SPASM: ICD-10-CM

## 2018-03-21 DIAGNOSIS — E78.1 HYPERTRIGLYCERIDEMIA: ICD-10-CM

## 2018-03-21 DIAGNOSIS — R73.9 HYPERGLYCEMIA: ICD-10-CM

## 2018-03-21 DIAGNOSIS — R63.5 WEIGHT GAIN: ICD-10-CM

## 2018-03-21 DIAGNOSIS — E66.01 OBESITY, MORBID, BMI 40.0-49.9: ICD-10-CM

## 2018-03-21 DIAGNOSIS — E55.9 VITAMIN D DEFICIENCY: Primary | ICD-10-CM

## 2018-03-21 PROCEDURE — 99999 PR PBB SHADOW E&M-EST. PATIENT-LVL III: CPT | Mod: PBBFAC,,, | Performed by: FAMILY MEDICINE

## 2018-03-21 PROCEDURE — 99213 OFFICE O/P EST LOW 20 MIN: CPT | Mod: PBBFAC,PO | Performed by: FAMILY MEDICINE

## 2018-03-21 PROCEDURE — 99214 OFFICE O/P EST MOD 30 MIN: CPT | Mod: S$PBB,,, | Performed by: FAMILY MEDICINE

## 2018-03-21 NOTE — PROGRESS NOTES
"[facility]  Progress Note    Subjective:       Patient ID: Elliott Ayala is a 57 y.o. male.    Chief Complaint: I get a lot of charley horses     HPI57 y.o.male date complaining of charley horses.  Patient has been having charley horses on his abdomen.  Patient has been eating bananas because someone said this may help.  Patient is due for annual labs today.  No further complaints today's visit.  Review of Systems   Constitutional: Negative for chills and fever.   HENT: Negative for congestion.    Eyes: Negative for pain.   Respiratory: Negative for shortness of breath and wheezing.    Cardiovascular: Negative for chest pain, palpitations and leg swelling.   Gastrointestinal: Negative for abdominal pain, nausea and vomiting.   Genitourinary: Negative for difficulty urinating.   Musculoskeletal: Negative for arthralgias, gait problem and myalgias.   Skin: Negative for rash.   Neurological: Negative for seizures.       Objective:      Vitals:    03/21/18 0912   BP: 114/80   BP Location: Right arm   Patient Position: Sitting   BP Method: Large (Automatic)   Pulse: 80   Temp: 98.7 °F (37.1 °C)   TempSrc: Oral   Weight: (!) 137.8 kg (303 lb 12.7 oz)   Height: 5' 7" (1.702 m)     Body mass index is 47.58 kg/m².  Physical Exam   Constitutional: He is oriented to person, place, and time. He appears well-developed and well-nourished. No distress.   HENT:   Head: Normocephalic and atraumatic.   Cardiovascular: Normal rate, regular rhythm, normal heart sounds and intact distal pulses.  Exam reveals no gallop and no friction rub.    No murmur heard.  Pulmonary/Chest: Effort normal and breath sounds normal. No respiratory distress. He has no rales.   Abdominal: Soft. He exhibits no distension and no mass. There is no rebound and no guarding. No hernia.   Musculoskeletal: Normal range of motion.   Neurological: He is alert and oriented to person, place, and time.   Skin: Skin is warm.   Psychiatric: He has a normal mood " and affect. His behavior is normal. Judgment and thought content normal.   Vitals reviewed.      Assessment:       1. Vitamin D deficiency    2. Muscle spasm    3. Hypertriglyceridemia    4. Hyperglycemia    5. Obesity, morbid, BMI 40.0-49.9    6. Weight gain        Plan:       Vitamin D deficiency  -     Vitamin D; Future; Expected date: 03/21/2018    Muscle spasm  -     Comprehensive metabolic panel; Future; Expected date: 03/21/2018  -     Magnesium; Future; Expected date: 03/21/2018    Hypertriglyceridemia  -     Lipid panel; Future; Expected date: 03/21/2018    Hyperglycemia  -     CBC auto differential; Future; Expected date: 03/21/2018  -     Hemoglobin A1c; Future; Expected date: 03/21/2018    Obesity, morbid, BMI 40.0-49.9        - Patient educated on diet and exercise     Weight gain  -     TSH; Future; Expected date: 03/21/2018      Follow-up in about 3 months (around 6/21/2018).  Cameron Hardy MD  [facility]  3/21/2018 12:45 PM

## 2018-04-25 ENCOUNTER — DOCUMENTATION ONLY (OUTPATIENT)
Dept: FAMILY MEDICINE | Facility: CLINIC | Age: 58
End: 2018-04-25

## 2018-04-25 NOTE — PROGRESS NOTES
Pre-Visit Chart Review  For Appointment Scheduled on 04/26/2018    Health Maintenance Due   Topic Date Due    TETANUS VACCINE  04/22/1980    Colonoscopy  04/22/2012    Influenza Vaccine  08/01/2017

## 2018-04-27 ENCOUNTER — OFFICE VISIT (OUTPATIENT)
Dept: FAMILY MEDICINE | Facility: CLINIC | Age: 58
End: 2018-04-27
Payer: MEDICARE

## 2018-04-27 ENCOUNTER — LAB VISIT (OUTPATIENT)
Dept: LAB | Facility: HOSPITAL | Age: 58
End: 2018-04-27
Attending: FAMILY MEDICINE
Payer: MEDICARE

## 2018-04-27 VITALS
HEIGHT: 67 IN | HEART RATE: 81 BPM | TEMPERATURE: 99 F | SYSTOLIC BLOOD PRESSURE: 139 MMHG | WEIGHT: 304.25 LBS | BODY MASS INDEX: 47.75 KG/M2 | DIASTOLIC BLOOD PRESSURE: 90 MMHG

## 2018-04-27 DIAGNOSIS — R63.5 WEIGHT GAIN: ICD-10-CM

## 2018-04-27 DIAGNOSIS — R73.9 HYPERGLYCEMIA: ICD-10-CM

## 2018-04-27 DIAGNOSIS — E55.9 VITAMIN D DEFICIENCY: ICD-10-CM

## 2018-04-27 DIAGNOSIS — E78.1 HYPERTRIGLYCERIDEMIA: ICD-10-CM

## 2018-04-27 DIAGNOSIS — H81.10 BENIGN PAROXYSMAL VERTIGO, UNSPECIFIED LATERALITY: Primary | ICD-10-CM

## 2018-04-27 DIAGNOSIS — M62.838 MUSCLE SPASM: ICD-10-CM

## 2018-04-27 LAB
25(OH)D3+25(OH)D2 SERPL-MCNC: 21 NG/ML
ALBUMIN SERPL BCP-MCNC: 3.5 G/DL
ALP SERPL-CCNC: 56 U/L
ALT SERPL W/O P-5'-P-CCNC: 21 U/L
ANION GAP SERPL CALC-SCNC: 9 MMOL/L
AST SERPL-CCNC: 18 U/L
BASOPHILS # BLD AUTO: 0.02 K/UL
BASOPHILS NFR BLD: 0.3 %
BILIRUB SERPL-MCNC: 0.3 MG/DL
BUN SERPL-MCNC: 12 MG/DL
CALCIUM SERPL-MCNC: 9.7 MG/DL
CHLORIDE SERPL-SCNC: 106 MMOL/L
CHOLEST SERPL-MCNC: 179 MG/DL
CHOLEST/HDLC SERPL: 4.5 {RATIO}
CO2 SERPL-SCNC: 28 MMOL/L
CREAT SERPL-MCNC: 0.9 MG/DL
DIFFERENTIAL METHOD: ABNORMAL
EOSINOPHIL # BLD AUTO: 0.2 K/UL
EOSINOPHIL NFR BLD: 2.5 %
ERYTHROCYTE [DISTWIDTH] IN BLOOD BY AUTOMATED COUNT: 13.1 %
EST. GFR  (AFRICAN AMERICAN): >60 ML/MIN/1.73 M^2
EST. GFR  (NON AFRICAN AMERICAN): >60 ML/MIN/1.73 M^2
ESTIMATED AVG GLUCOSE: 111 MG/DL
GLUCOSE SERPL-MCNC: 106 MG/DL
HBA1C MFR BLD HPLC: 5.5 %
HCT VFR BLD AUTO: 44.9 %
HDLC SERPL-MCNC: 40 MG/DL
HDLC SERPL: 22.3 %
HGB BLD-MCNC: 14.2 G/DL
IMM GRANULOCYTES # BLD AUTO: 0.02 K/UL
IMM GRANULOCYTES NFR BLD AUTO: 0.3 %
LDLC SERPL CALC-MCNC: 114.6 MG/DL
LYMPHOCYTES # BLD AUTO: 2.2 K/UL
LYMPHOCYTES NFR BLD: 36.7 %
MAGNESIUM SERPL-MCNC: 2.2 MG/DL
MCH RBC QN AUTO: 27.7 PG
MCHC RBC AUTO-ENTMCNC: 31.6 G/DL
MCV RBC AUTO: 88 FL
MONOCYTES # BLD AUTO: 0.5 K/UL
MONOCYTES NFR BLD: 8.8 %
NEUTROPHILS # BLD AUTO: 3 K/UL
NEUTROPHILS NFR BLD: 51.4 %
NONHDLC SERPL-MCNC: 139 MG/DL
NRBC BLD-RTO: 0 /100 WBC
PLATELET # BLD AUTO: 267 K/UL
PMV BLD AUTO: 10.6 FL
POTASSIUM SERPL-SCNC: 4.5 MMOL/L
PROT SERPL-MCNC: 7.1 G/DL
RBC # BLD AUTO: 5.12 M/UL
SODIUM SERPL-SCNC: 143 MMOL/L
TRIGL SERPL-MCNC: 122 MG/DL
TSH SERPL DL<=0.005 MIU/L-ACNC: 2.53 UIU/ML
WBC # BLD AUTO: 5.91 K/UL

## 2018-04-27 PROCEDURE — 84443 ASSAY THYROID STIM HORMONE: CPT

## 2018-04-27 PROCEDURE — 99213 OFFICE O/P EST LOW 20 MIN: CPT | Mod: S$PBB,,, | Performed by: PHYSICIAN ASSISTANT

## 2018-04-27 PROCEDURE — 80061 LIPID PANEL: CPT

## 2018-04-27 PROCEDURE — 85025 COMPLETE CBC W/AUTO DIFF WBC: CPT

## 2018-04-27 PROCEDURE — 99214 OFFICE O/P EST MOD 30 MIN: CPT | Mod: PBBFAC,PO | Performed by: PHYSICIAN ASSISTANT

## 2018-04-27 PROCEDURE — 80053 COMPREHEN METABOLIC PANEL: CPT

## 2018-04-27 PROCEDURE — 83036 HEMOGLOBIN GLYCOSYLATED A1C: CPT

## 2018-04-27 PROCEDURE — 82306 VITAMIN D 25 HYDROXY: CPT

## 2018-04-27 PROCEDURE — 83735 ASSAY OF MAGNESIUM: CPT

## 2018-04-27 PROCEDURE — 36415 COLL VENOUS BLD VENIPUNCTURE: CPT | Mod: PO

## 2018-04-27 PROCEDURE — 99999 PR PBB SHADOW E&M-EST. PATIENT-LVL IV: CPT | Mod: PBBFAC,,, | Performed by: PHYSICIAN ASSISTANT

## 2018-04-27 RX ORDER — MECLIZINE HYDROCHLORIDE 25 MG/1
25 TABLET ORAL 3 TIMES DAILY PRN
Qty: 30 TABLET | Refills: 0 | Status: SHIPPED | OUTPATIENT
Start: 2018-04-27 | End: 2022-03-30 | Stop reason: SDUPTHER

## 2018-04-27 NOTE — PROGRESS NOTES
Subjective:       Patient ID: Elliott Ayala is a 58 y.o. male.    Chief Complaint: Excessive Sweating and Dizziness    Patient with BRAXTON, chronic allergies on regular allergy injections followed by Dr Bonds presents for evaluation of sensation of room spinning.  He feels this sensation when going from sitting to standing or when rolling over in bed.   Symptoms began about 1 week ago.  His allergy symptoms are not particularly bothersome at the present time.  He is otherwise feeling well.        Review of Systems   Constitutional: Negative for activity change, appetite change and fatigue.   HENT: Positive for congestion and rhinorrhea. Negative for sinus pain, sinus pressure, sneezing and sore throat.    Eyes: Positive for visual disturbance (after ejactualtion ).   Gastrointestinal: Negative for diarrhea, nausea and vomiting.   Neurological: Positive for dizziness. Negative for syncope, speech difficulty, weakness, light-headedness and headaches.       Objective:      Physical Exam   Constitutional: He is oriented to person, place, and time. He appears well-developed and well-nourished. He is cooperative. No distress.   HENT:   Head: Normocephalic and atraumatic.   Right Ear: Tympanic membrane, external ear and ear canal normal.   Left Ear: Tympanic membrane, external ear and ear canal normal.   Mouth/Throat: Oropharynx is clear and moist and mucous membranes are normal.   Eyes: Conjunctivae and EOM are normal. Pupils are equal, round, and reactive to light. No scleral icterus.   Neck: Trachea normal. Carotid bruit is not present. No thyromegaly present.   Cardiovascular: Normal rate, regular rhythm and normal heart sounds.    Pulmonary/Chest: Effort normal and breath sounds normal.   Abdominal: Soft. Bowel sounds are normal. There is no tenderness.   Musculoskeletal:        Right lower leg: He exhibits no edema.        Left lower leg: He exhibits no edema.   Neurological: He is alert and oriented to person, place,  and time. He has normal strength. No cranial nerve deficit or sensory deficit. He displays a negative Romberg sign.   Vitals reviewed.      Assessment:       1. Benign paroxysmal vertigo, unspecified laterality         Plan:       Elliott was seen today for excessive sweating and dizziness.    Diagnoses and all orders for this visit:    Benign paroxysmal vertigo, unspecified laterality   -     Ambulatory referral to ENT  -     meclizine (ANTIVERT) 25 mg tablet; Take 1 tablet (25 mg total) by mouth 3 (three) times daily as needed.  -     Ambulatory Referral to Audiology

## 2018-05-22 ENCOUNTER — TELEPHONE (OUTPATIENT)
Dept: FAMILY MEDICINE | Facility: CLINIC | Age: 58
End: 2018-05-22

## 2018-05-22 NOTE — TELEPHONE ENCOUNTER
----- Message from Ani Verde LPN sent at 5/21/2018  4:32 PM CDT -----  Contact: self      ----- Message -----  From: Ani Verde LPN  Sent: 5/21/2018  12:32 PM  To: Antony Barnes Staff        ----- Message -----  From: Ani Verde LPN  Sent: 5/18/2018   4:56 PM  To: Ani Verde LPN        ----- Message -----  From: Ani Verde LPN  Sent: 5/18/2018  11:12 AM  To: Rajeev Acuña Staff        ----- Message -----  From: Ani Verde LPN  Sent: 5/17/2018   5:03 PM  To: Ani Verde LPN        ----- Message -----  From: Trinidad Anderson  Sent: 5/17/2018  11:16 AM  To: Rajeev Acuña Staff    Patient [phone] is calling for his blood lab results from about two weeks ago at [facility]/please call patient

## 2018-08-17 ENCOUNTER — OFFICE VISIT (OUTPATIENT)
Dept: FAMILY MEDICINE | Facility: CLINIC | Age: 58
End: 2018-08-17
Payer: MEDICARE

## 2018-08-17 VITALS
WEIGHT: 297 LBS | HEART RATE: 74 BPM | BODY MASS INDEX: 46.62 KG/M2 | TEMPERATURE: 98 F | DIASTOLIC BLOOD PRESSURE: 84 MMHG | HEIGHT: 67 IN | SYSTOLIC BLOOD PRESSURE: 138 MMHG

## 2018-08-17 DIAGNOSIS — R05.9 COUGH: ICD-10-CM

## 2018-08-17 DIAGNOSIS — G44.82 COITAL HEADACHE: ICD-10-CM

## 2018-08-17 DIAGNOSIS — L23.7 ALLERGIC CONTACT DERMATITIS DUE TO PLANTS, EXCEPT FOOD: ICD-10-CM

## 2018-08-17 DIAGNOSIS — J30.9 ALLERGIC SINUSITIS: Primary | ICD-10-CM

## 2018-08-17 PROCEDURE — 99214 OFFICE O/P EST MOD 30 MIN: CPT | Mod: S$PBB,,, | Performed by: NURSE PRACTITIONER

## 2018-08-17 PROCEDURE — 99999 PR PBB SHADOW E&M-EST. PATIENT-LVL III: CPT | Mod: PBBFAC,,, | Performed by: NURSE PRACTITIONER

## 2018-08-17 PROCEDURE — 99213 OFFICE O/P EST LOW 20 MIN: CPT | Mod: PBBFAC,PO | Performed by: NURSE PRACTITIONER

## 2018-08-17 RX ORDER — BETAMETHASONE VALERATE 1.2 MG/G
OINTMENT TOPICAL 2 TIMES DAILY
Qty: 15 G | Refills: 0 | Status: SHIPPED | OUTPATIENT
Start: 2018-08-17 | End: 2021-10-29

## 2018-08-17 RX ORDER — MONTELUKAST SODIUM 10 MG/1
10 TABLET ORAL NIGHTLY
Qty: 30 TABLET | Refills: 2 | Status: SHIPPED | OUTPATIENT
Start: 2018-08-17 | End: 2021-10-29

## 2018-08-17 NOTE — PROGRESS NOTES
Subjective:       Patient ID: Elliott Ayala is a 58 y.o. male.    Chief Complaint: Rash    HPI   Chief Complaint  Chief Complaint   Patient presents with    Rash       HPI  Elliott Ayala is a 58 y.o. male with medical diagnoses as listed in the medical history and problem list that presents with c/o rash to right hand that is pruritic and has been present for 10 days to 2 weeks. First noticed rash to hand in a small patch of red raised dots that were pruritic and eventually spread to an area of about 7 cm in diameter to the dorsal hand near thumb joint. Blisters formed and patient scratched the area and it drained.  Basically dried now but still pruritic.  Patient does recall that he was cutting grass before rash developed the day after cutting grass on riding mower and driving mower under some bushes and ranulfo. Has tried neosporin, benadryl cream, and calamine lotion with some relief and drying of rash.  Also c/o headache to posterior head and a dry cough.  Patient does have sinus allergies and has been using his nasal sprays.  Patient is receiving allergy injections. BMI today is 46.52 and weight is down 7 pounds since last visit.  Established patient with last clinic appointment 4/27/2018.        PAST MEDICAL HISTORY:  Past Medical History:   Diagnosis Date    Allergy        PAST SURGICAL HISTORY:  Past Surgical History:   Procedure Laterality Date    ACHILLES TENDON SURGERY         SOCIAL HISTORY:  Social History     Socioeconomic History    Marital status:      Spouse name: Not on file    Number of children: Not on file    Years of education: Not on file    Highest education level: Not on file   Social Needs    Financial resource strain: Not on file    Food insecurity - worry: Not on file    Food insecurity - inability: Not on file    Transportation needs - medical: Not on file    Transportation needs - non-medical: Not on file   Occupational History    Not on file   Tobacco Use    Smoking  status: Former Smoker    Smokeless tobacco: Never Used   Substance and Sexual Activity    Alcohol use: Yes     Comment: social     Drug use: Yes     Types: Marijuana    Sexual activity: Yes   Other Topics Concern    Not on file   Social History Narrative    Not on file       FAMILY HISTORY:  Family History   Problem Relation Age of Onset    Diabetes Mother     Heart disease Father     Diabetes Maternal Aunt     Diabetes Maternal Uncle     Glaucoma Neg Hx        ALLERGIES AND MEDICATIONS: updated and reviewed.  Review of patient's allergies indicates:   Allergen Reactions    Codeine     Penicillins      Current Outpatient Medications   Medication Sig Dispense Refill    flunisolide 25 mcg, 0.025%, (NASALIDE) 25 mcg (0.025 %) Spry 2 sprays by Nasal route 2 (two) times daily.      olopatadine (PATANASE) 0.6 % nasal spray 1 spray by Each Nare route 2 (two) times daily.      betamethasone valerate 0.1% (VALISONE) 0.1 % Oint Apply topically 2 (two) times daily. To affected area for 10 days 15 g 0    meclizine (ANTIVERT) 25 mg tablet Take 1 tablet (25 mg total) by mouth 3 (three) times daily as needed. 30 tablet 0    montelukast (SINGULAIR) 10 mg tablet Take 1 tablet (10 mg total) by mouth every evening. 30 tablet 2     No current facility-administered medications for this visit.            Review of Systems   Constitutional: Negative for activity change, appetite change, chills, fatigue and fever.   HENT: Positive for postnasal drip, rhinorrhea and sore throat. Negative for congestion, ear pain, sinus pressure and sinus pain.         Some runny nose and slight post nasal drip, irritated throat.   Eyes: Negative for visual disturbance.   Respiratory: Positive for cough. Negative for shortness of breath.         Dry cough, occasional shortness of breath with exertion recently   Cardiovascular: Negative for chest pain and palpitations.   Gastrointestinal: Negative for abdominal pain, constipation, diarrhea,  "nausea and vomiting.   Genitourinary: Negative for difficulty urinating and dysuria.   Musculoskeletal: Negative for arthralgias and myalgias.   Neurological: Positive for headaches. Negative for dizziness and numbness.        Headache to posterior lower skull area, advil with some relief, states has been causing some difficulty sleeping; This headache feels like sinus headache.  Also states that he has noted a sharp headache during intercourse/orgasm on 2 occasions over the past few months. The pain is stabbing but does not last long.    Psychiatric/Behavioral: Negative for dysphoric mood and sleep disturbance. The patient is not nervous/anxious.        Objective:       Vitals:    08/17/18 1314   BP: 138/84   BP Location: Right arm   Patient Position: Sitting   BP Method: Large (Automatic)   Pulse: 74   Temp: 98.4 °F (36.9 °C)   TempSrc: Oral   Weight: 134.7 kg (297 lb)   Height: 5' 7" (1.702 m)     Physical Exam   Constitutional: He is oriented to person, place, and time. Vital signs are normal. He appears well-developed. No distress.   Blood pressure 138/84   HENT:   Head: Normocephalic and atraumatic.   Right Ear: Tympanic membrane, external ear and ear canal normal.   Left Ear: Tympanic membrane, external ear and ear canal normal.   Nose: Mucosal edema and rhinorrhea present.   Mouth/Throat: Oropharynx is clear and moist and mucous membranes are normal.   Bilateral nares with swelling noted, pink, boggy, clear drainage   Eyes: Conjunctivae and lids are normal. Right eye exhibits no discharge. Left eye exhibits no discharge. Right conjunctiva is not injected. Left conjunctiva is not injected.   Neck: Normal carotid pulses present. Carotid bruit is not present.   Cardiovascular: Normal rate, regular rhythm, S1 normal, S2 normal, normal heart sounds, intact distal pulses and normal pulses.   No murmur heard.  Pulses:       Carotid pulses are 2+ on the right side, and 2+ on the left side.       Radial pulses are 2+ " on the right side, and 2+ on the left side.   Pulmonary/Chest: Effort normal and breath sounds normal. No respiratory distress. He has no decreased breath sounds. He has no wheezes. He has no rhonchi. He has no rales.   Musculoskeletal: Normal range of motion.   Lymphadenopathy:     He has no cervical adenopathy.   Neurological: He is alert and oriented to person, place, and time. He has normal strength.   Skin: Skin is warm and dry. Rash noted. He is not diaphoretic.        Psychiatric: He has a normal mood and affect. His speech is normal and behavior is normal. Judgment and thought content normal. Cognition and memory are normal.   Nursing note and vitals reviewed.      Assessment:       1. Allergic sinusitis    2. Cough    3. Allergic contact dermatitis due to plants, except food    4. Coital headache    5. BMI 45.0-49.9, adult        Plan:   Elliott was seen today for rash.    Diagnoses and all orders for this visit:    Allergic sinusitis  -     montelukast (SINGULAIR) 10 mg tablet; Take 1 tablet (10 mg total) by mouth every evening, new medication.    Cough  -     montelukast (SINGULAIR) 10 mg tablet; Take 1 tablet (10 mg total) by mouth every evening, new medication.    Allergic contact dermatitis due to plants, except food  -     betamethasone valerate 0.1% (VALISONE) 0.1 % Oint; Apply topically 2 (two) times daily. To affected area for 10 days  - If not resolved with prescription ointment, patient will keep appointment scheduled with dermatology    Coital headache   Discussed coital headache with patient including going over educational information available on Up To Date   Recommended use of advil or aleve prior to intercourse as preventive measure   If headaches become more frequent could consider a triptan or propranolol for prevention  BMI 45.0-49.9, adult   BMI 46.52 with a 7 pound weight loss since visit 4/27/18   Obesity discussed   Weight loss recommended with well balanced diet and portion controlled  meals and increased activity.     Follow-up if symptoms worsen or fail to improve.     Patient readiness: acceptance and barriers:none    During the course of the visit the patient was educated and counseled about the following:     Obesity:   General weight loss/lifestyle modification strategies discussed (elicit support from others; identify saboteurs; non-food rewards, etc).  Diet interventions: low calorie (1000 kCal/d) deficit diet.  Informal exercise measures discussed, e.g. taking stairs instead of elevator.  Regular aerobic exercise program discussed.    Goals: Obesity: Reduce calorie intake and BMI    Did patient meet goals/outcomes: Yes, weight loss of 7 pounds since visit 4/27/18    The following self management tools provided: declined    Patient Instructions (the written plan) was given to the patient/family.     Time spent with patient: 30 minutes    Barriers to medications present (no )    Adverse reactions to current medications (no)    Over the counter medications reviewed (Yes)

## 2018-08-19 PROBLEM — J30.9 ALLERGIC SINUSITIS: Status: ACTIVE | Noted: 2018-08-19

## 2018-08-19 PROBLEM — G44.82 COITAL HEADACHE: Status: ACTIVE | Noted: 2018-08-19

## 2018-08-31 ENCOUNTER — OFFICE VISIT (OUTPATIENT)
Dept: DERMATOLOGY | Facility: CLINIC | Age: 58
End: 2018-08-31
Payer: MEDICARE

## 2018-08-31 VITALS — HEIGHT: 67 IN | BODY MASS INDEX: 46.61 KG/M2 | WEIGHT: 296.94 LBS

## 2018-08-31 DIAGNOSIS — L30.9 ECZEMA OF HAND: Primary | ICD-10-CM

## 2018-08-31 PROCEDURE — 99999 PR PBB SHADOW E&M-EST. PATIENT-LVL III: CPT | Mod: PBBFAC,,, | Performed by: DERMATOLOGY

## 2018-08-31 PROCEDURE — 99213 OFFICE O/P EST LOW 20 MIN: CPT | Mod: PBBFAC,PO | Performed by: DERMATOLOGY

## 2018-08-31 PROCEDURE — 99201 PR OFFICE/OUTPT VISIT,NEW,LEVL I: CPT | Mod: S$PBB,,, | Performed by: DERMATOLOGY

## 2018-08-31 NOTE — PATIENT INSTRUCTIONS
XEROSIS (DRY SKIN)    Xerosis is the term for dry skin.  We all have a natural oil coating over our skin produced by the skin oil glands.  If this oil is removed, the skin becomes dry which can lead to cracking, which can lead to inflammation. Xerosis is usually a long-term problem that recurs often, especially in the winter.    1. Cause     Long hot baths or showers can remove our natural oil and lead to xerosis.  One should never take more than one bath or shower a day and for no longer than ten minutes.   Use of harsh soaps such as Zest, Dial, and Ivory can worsen and cause xerosis.   Cold winter weather worsens xerosis because the amount of moisture contained in cold air is much less than the amount of moisture in warm air.    2. Treatment     Treatment is intended to restore the natural oil to your skin.  Keep the skin lubricated.     Do not take more than one bath or shower a day.  Use lukewarm water, not hot.  Hot water dries out the skin.     Use a gentle moisturizing soap such as Cetaphil soap, cerave gentle cleanser, Oil of Olay, Dove sensitive bar, Basis, Ivory moisture care, Restoraderm cleanser.     When toweling dry, dont rub.  Blot the skin so there is still some water left on the skin.  Immediately after toweling, you should apply a moisturizing cream from neck to toes such as Cerave cream, Cetaphil cream, Restoraderm or Eucerin Original Formula cream.  Alpha hydroxyacid lotions, i.e., AmLactin or Cerave SA, also work very well for preventing dry skin, but may burn when used on inflamed or reddened skin.     If you like to swim during the winter months, you should not use soap when getting out of the pool.  When you have finished swimming, rinse off the chlorine with cool to warm water.  If this will be the only shower of the day, then you may use Cetaphil or another mild soap to cleanse your skin.  After the shower, apply a moisturizing cream to all of the skin as above.    3. Additional  recommendation:      Use unscented hypoallergenic detergent for your clothes, avoid softener or dryer sheets unless they are unscented and hypoallergenic (all free and clear; downy free and gentle).    [facility]; [address] [place] [address] Tel: [phone] Fax: [phone]

## 2018-08-31 NOTE — PROGRESS NOTES
"  Subjective:       Patient ID:  Elliott Ayala is a 58 y.o. male who presents for   Chief Complaint   Patient presents with    Rash     1 month, right hand, itches, tx valisone oint and po singulair, progress improved     Initial visit  H/o eczema  C/o rash on R wrist, started 1 mo ago, very itchy, red, raised  Treated with valisone ointment, markedly improved, occasionally still itchy  Disabled, no hobbies involving exposure of hand to chemicals  Frequent hand washings, "balck soap"      Current Outpatient Medications:     betamethasone valerate 0.1% (VALISONE) 0.1 % Oint, Apply topically 2 (two) times daily. To affected area for 10 days, Disp: 15 g, Rfl: 0    flunisolide 25 mcg, 0.025%, (NASALIDE) 25 mcg (0.025 %) Spry, 2 sprays by Nasal route 2 (two) times daily., Disp: , Rfl:     meclizine (ANTIVERT) 25 mg tablet, Take 1 tablet (25 mg total) by mouth 3 (three) times daily as needed., Disp: 30 tablet, Rfl: 0    montelukast (SINGULAIR) 10 mg tablet, Take 1 tablet (10 mg total) by mouth every evening., Disp: 30 tablet, Rfl: 2    olopatadine (PATANASE) 0.6 % nasal spray, 1 spray by Each Nare route 2 (two) times daily., Disp: , Rfl:         Rash  - Initial  Affected locations: right hand  Duration: 1 month  Signs / symptoms: itching and dryness  Severity: mild to moderate  Timing: constant  Aggravated by: heat  Relieving factors/Treatments tried: Rx topical steroids  Improvement on treatment: mild        Review of Systems   Constitutional: Negative for fever, weight loss and weight gain.   Skin: Positive for rash, activity-related sunscreen use and wears hat. Negative for daily sunscreen use.   Hematologic/Lymphatic: Does not bruise/bleed easily.        Objective:    Physical Exam   Constitutional: He appears well-developed and well-nourished. No distress.   Neurological: He is alert and oriented to person, place, and time. He is not disoriented.   Psychiatric: He has a normal mood and affect.   Skin:   Areas " Examined (abnormalities noted in diagram):   Nails and Digits Inspection Performed              Diagram Legend     Erythematous scaling macule/papule c/w actinic keratosis       Vascular papule c/w angioma      Pigmented verrucoid papule/plaque c/w seborrheic keratosis      Yellow umbilicated papule c/w sebaceous hyperplasia      Irregularly shaped tan macule c/w lentigo     1-2 mm smooth white papules consistent with Milia      Movable subcutaneous cyst with punctum c/w epidermal inclusion cyst      Subcutaneous movable cyst c/w pilar cyst      Firm pink to brown papule c/w dermatofibroma      Pedunculated fleshy papule(s) c/w skin tag(s)      Evenly pigmented macule c/w junctional nevus     Mildly variegated pigmented, slightly irregular-bordered macule c/w mildly atypical nevus      Flesh colored to evenly pigmented papule c/w intradermal nevus       Pink pearly papule/plaque c/w basal cell carcinoma      Erythematous hyperkeratotic cursted plaque c/w SCC      Surgical scar with no sign of skin cancer recurrence      Open and closed comedones      Inflammatory papules and pustules      Verrucoid papule consistent consistent with wart     Erythematous eczematous patches and plaques     Dystrophic onycholytic nail with subungual debris c/w onychomycosis     Umbilicated papule    Erythematous-base heme-crusted tan verrucoid plaque consistent with inflamed seborrheic keratosis     Erythematous Silvery Scaling Plaque c/w Psoriasis     See annotation      Assessment / Plan:        Eczema of hand    clinically almost resolved today  Use gentle soap, moisturize after every hand washing  Continue betamethasone valeratd ointment BID until itch resolved           Follow-up if symptoms worsen or fail to improve.

## 2018-11-20 ENCOUNTER — OFFICE VISIT (OUTPATIENT)
Dept: FAMILY MEDICINE | Facility: CLINIC | Age: 58
End: 2018-11-20
Payer: MEDICARE

## 2018-11-20 VITALS
SYSTOLIC BLOOD PRESSURE: 121 MMHG | DIASTOLIC BLOOD PRESSURE: 77 MMHG | WEIGHT: 302.94 LBS | HEART RATE: 96 BPM | HEIGHT: 67 IN | OXYGEN SATURATION: 95 % | BODY MASS INDEX: 47.55 KG/M2

## 2018-11-20 DIAGNOSIS — M54.40 ACUTE LOW BACK PAIN WITH SCIATICA, SCIATICA LATERALITY UNSPECIFIED, UNSPECIFIED BACK PAIN LATERALITY: Primary | ICD-10-CM

## 2018-11-20 DIAGNOSIS — M62.830 LUMBAR PARASPINAL MUSCLE SPASM: ICD-10-CM

## 2018-11-20 DIAGNOSIS — M54.40 ACUTE BILATERAL LOW BACK PAIN WITH SCIATICA, SCIATICA LATERALITY UNSPECIFIED: ICD-10-CM

## 2018-11-20 PROCEDURE — 99213 OFFICE O/P EST LOW 20 MIN: CPT | Mod: S$PBB,,, | Performed by: PHYSICIAN ASSISTANT

## 2018-11-20 PROCEDURE — 99214 OFFICE O/P EST MOD 30 MIN: CPT | Mod: PBBFAC,PO | Performed by: PHYSICIAN ASSISTANT

## 2018-11-20 PROCEDURE — 99999 PR PBB SHADOW E&M-EST. PATIENT-LVL IV: CPT | Mod: PBBFAC,,, | Performed by: PHYSICIAN ASSISTANT

## 2018-11-20 RX ORDER — TIZANIDINE 4 MG/1
4 TABLET ORAL EVERY 6 HOURS PRN
Qty: 30 TABLET | Refills: 1 | Status: SHIPPED | OUTPATIENT
Start: 2018-11-20 | End: 2018-11-30

## 2018-11-20 NOTE — PROGRESS NOTES
Subjective:       Patient ID: Elliott Ayala is a 58 y.o. male.    Chief Complaint: Back Pain    HPI   Acute lumbar back pain x 1 wk  Set off by reaching  Hx of recurrent back problem primarily spasm  Current pain is bilat  Hx of spina bifida  Usually rest and PT works   Review of Systems   Constitutional: Positive for activity change. Negative for appetite change, chills, diaphoresis, fatigue, fever and unexpected weight change.   HENT: Negative.    Eyes: Negative.    Respiratory: Negative.  Negative for cough and shortness of breath.    Cardiovascular: Negative.  Negative for chest pain and leg swelling.   Gastrointestinal: Negative.    Endocrine: Negative.    Genitourinary: Negative.    Musculoskeletal: Positive for back pain and myalgias.   Skin: Negative.  Negative for rash.   Neurological: Negative.        Objective:      Physical Exam   Constitutional: He appears well-developed and well-nourished. No distress.   HENT:   Head: Normocephalic and atraumatic.   Eyes: Conjunctivae are normal. No scleral icterus.   Neck: Normal range of motion. Neck supple. No tracheal deviation present. No thyromegaly present.   Musculoskeletal: He exhibits tenderness. He exhibits no edema.   Tight and tender lumbar paravertebral muscles R>L  Mild R sciatic tenderness  No vertebral tenderness on percussion  SLR's 80 degrees bilat without back pain   Lymphadenopathy:     He has no cervical adenopathy.   Neurological: He displays abnormal reflex.   Diminished lower ext DTR's    Skin: Skin is warm and dry. No rash noted.   Vitals reviewed.      Assessment:       1. Acute low back pain with sciatica, sciatica laterality unspecified, unspecified back pain laterality    2. Lumbar paraspinal muscle spasm    3. Acute bilateral low back pain with sciatica, sciatica laterality unspecified        Plan:       Acute low back pain with sciatica, sciatica laterality unspecified, unspecified back pain laterality  -     tiZANidine (ZANAFLEX) 4 MG  tablet; Take 1 tablet (4 mg total) by mouth every 6 (six) hours as needed.  Dispense: 30 tablet; Refill: 1    Lumbar paraspinal muscle spasm  -     tiZANidine (ZANAFLEX) 4 MG tablet; Take 1 tablet (4 mg total) by mouth every 6 (six) hours as needed.  Dispense: 30 tablet; Refill: 1    Acute bilateral low back pain with sciatica, sciatica laterality unspecified  -     X-Ray Lumbar Spine Complete 5 View; Future; Expected date: 11/20/2018

## 2018-11-26 ENCOUNTER — HOSPITAL ENCOUNTER (OUTPATIENT)
Dept: RADIOLOGY | Facility: CLINIC | Age: 58
Discharge: HOME | End: 2018-11-26
Attending: PHYSICIAN ASSISTANT
Payer: MEDICARE

## 2018-11-26 ENCOUNTER — TELEPHONE (OUTPATIENT)
Dept: FAMILY MEDICINE | Facility: CLINIC | Age: 58
End: 2018-11-26

## 2018-11-26 DIAGNOSIS — M54.40 ACUTE BILATERAL LOW BACK PAIN WITH SCIATICA, SCIATICA LATERALITY UNSPECIFIED: ICD-10-CM

## 2018-11-26 PROCEDURE — 72110 X-RAY EXAM L-2 SPINE 4/>VWS: CPT | Mod: TC,FY,PO

## 2018-11-26 PROCEDURE — 72110 X-RAY EXAM L-2 SPINE 4/>VWS: CPT | Mod: 26,,, | Performed by: RADIOLOGY

## 2018-11-27 NOTE — TELEPHONE ENCOUNTER
Spoke with patient and gave him the results per LOGAN Pearson. Patient confirmed his understanding of the results without further questions.

## 2020-05-19 ENCOUNTER — OFFICE VISIT (OUTPATIENT)
Dept: FAMILY MEDICINE | Facility: CLINIC | Age: 60
End: 2020-05-19
Payer: MEDICARE

## 2020-05-19 VITALS
OXYGEN SATURATION: 97 % | TEMPERATURE: 97 F | HEART RATE: 98 BPM | HEIGHT: 67 IN | BODY MASS INDEX: 49.44 KG/M2 | WEIGHT: 315 LBS | DIASTOLIC BLOOD PRESSURE: 72 MMHG | SYSTOLIC BLOOD PRESSURE: 138 MMHG

## 2020-05-19 DIAGNOSIS — R05.9 COUGH: ICD-10-CM

## 2020-05-19 DIAGNOSIS — M62.838 MUSCLE SPASM: Primary | ICD-10-CM

## 2020-05-19 DIAGNOSIS — J30.9 ALLERGIC SINUSITIS: ICD-10-CM

## 2020-05-19 DIAGNOSIS — M54.50 ACUTE LEFT-SIDED LOW BACK PAIN WITHOUT SCIATICA: ICD-10-CM

## 2020-05-19 PROCEDURE — 99213 OFFICE O/P EST LOW 20 MIN: CPT | Mod: S$PBB,,, | Performed by: NURSE PRACTITIONER

## 2020-05-19 PROCEDURE — 99213 PR OFFICE/OUTPT VISIT, EST, LEVL III, 20-29 MIN: ICD-10-PCS | Mod: S$PBB,,, | Performed by: NURSE PRACTITIONER

## 2020-05-19 PROCEDURE — 99999 PR PBB SHADOW E&M-EST. PATIENT-LVL IV: ICD-10-PCS | Mod: PBBFAC,,, | Performed by: NURSE PRACTITIONER

## 2020-05-19 PROCEDURE — 99999 PR PBB SHADOW E&M-EST. PATIENT-LVL IV: CPT | Mod: PBBFAC,,, | Performed by: NURSE PRACTITIONER

## 2020-05-19 PROCEDURE — 99214 OFFICE O/P EST MOD 30 MIN: CPT | Mod: PBBFAC,PO | Performed by: NURSE PRACTITIONER

## 2020-05-19 RX ORDER — MELOXICAM 15 MG/1
15 TABLET ORAL DAILY
Qty: 30 TABLET | Refills: 0 | Status: SHIPPED | OUTPATIENT
Start: 2020-05-19 | End: 2020-06-15

## 2020-05-19 RX ORDER — METHOCARBAMOL 500 MG/1
500 TABLET, FILM COATED ORAL 4 TIMES DAILY
Qty: 40 TABLET | Refills: 0 | Status: SHIPPED | OUTPATIENT
Start: 2020-05-19 | End: 2020-05-29

## 2020-05-19 NOTE — PROGRESS NOTES
Patient ID: Elliott Ayala is a 60 y.o. male.    Chief Complaint:   Back Pain    Flank Pain   This is a new problem. The current episode started yesterday. The problem occurs constantly. The problem has been waxing and waning since onset. The pain is present in the lumbar spine (right side). The quality of the pain is described as shooting, stabbing and aching. Radiates to: lower back. The pain is at a severity of 6/10. The pain is moderate. The symptoms are aggravated by bending, coughing, standing and twisting. Pertinent negatives include no abdominal pain, bladder incontinence, bowel incontinence, chest pain, dysuria, fever, headaches, leg pain, numbness, paresis, paresthesias, pelvic pain, perianal numbness, tingling, weakness or weight loss. Risk factors include lack of exercise, obesity and sedentary lifestyle. He has tried heat for the symptoms. The treatment provided no relief.      Patient is new to me. Reviewed past medical and social history.    Past Medical History:   Diagnosis Date    Allergy      Past Surgical History:   Procedure Laterality Date    ACHILLES TENDON SURGERY       Current Outpatient Medications on File Prior to Visit   Medication Sig Dispense Refill    flunisolide 25 mcg, 0.025%, (NASALIDE) 25 mcg (0.025 %) Spry 2 sprays by Nasal route 2 (two) times daily.      meclizine (ANTIVERT) 25 mg tablet Take 1 tablet (25 mg total) by mouth 3 (three) times daily as needed. 30 tablet 0    montelukast (SINGULAIR) 10 mg tablet Take 1 tablet (10 mg total) by mouth every evening. 30 tablet 2    betamethasone valerate 0.1% (VALISONE) 0.1 % Oint Apply topically 2 (two) times daily. To affected area for 10 days 15 g 0    olopatadine (PATANASE) 0.6 % nasal spray 1 spray by Each Nare route 2 (two) times daily.       No current facility-administered medications on file prior to visit.        Review of Systems   Constitutional: Negative for chills, fever and weight loss.   HENT: Positive for  congestion, sneezing and sore throat. Negative for ear pain, postnasal drip, rhinorrhea, sinus pressure and sinus pain.    Respiratory: Positive for cough. Negative for shortness of breath and wheezing.    Cardiovascular: Negative for chest pain.   Gastrointestinal: Negative for abdominal pain and bowel incontinence.   Genitourinary: Negative for bladder incontinence, dysuria, flank pain and pelvic pain.   Musculoskeletal: Positive for myalgias. Negative for arthralgias, neck pain and neck stiffness.   Skin: Negative for rash.   Neurological: Negative for tingling, weakness, numbness, headaches and paresthesias.   All other systems reviewed and are negative.      Objective:      Physical Exam   Constitutional: He is oriented to person, place, and time. He appears well-developed and well-nourished.   HENT:   Head: Normocephalic.   Mouth/Throat: Mucous membranes are normal. Posterior oropharyngeal erythema present. No oropharyngeal exudate.   Eyes: Pupils are equal, round, and reactive to light. Conjunctivae are normal.   Neck: Normal range of motion. No JVD present. No tracheal deviation present. No thyromegaly present.   Cardiovascular: Normal rate, regular rhythm and normal heart sounds.   Pulmonary/Chest: Effort normal and breath sounds normal.   Abdominal: Soft. Bowel sounds are normal. He exhibits no distension. There is no tenderness.   Musculoskeletal: Normal range of motion. He exhibits tenderness.   Noted to right flank, worse with laugh, sneeze, cough   Neurological: He is alert and oriented to person, place, and time.   Skin: Skin is warm and dry. Capillary refill takes less than 2 seconds.   Psychiatric: He has a normal mood and affect.   Vitals reviewed.      Assessment:       1. Muscle spasm    2. Acute left-sided low back pain without sciatica    3. BMI 45.0-49.9, adult    4. Cough    5. Allergic sinusitis        Plan:       Elliott was seen today for back pain.    Diagnoses and all orders for this  visit:    Muscle spasm  -     methocarbamoL (ROBAXIN) 500 MG Tab; Take 1 tablet (500 mg total) by mouth 4 (four) times daily. for 10 days    Acute left-sided low back pain without sciatica  -     meloxicam (MOBIC) 15 MG tablet; Take 1 tablet (15 mg total) by mouth once daily.    BMI 45.0-49.9, adult    Cough    Allergic sinusitis    Patient under care of allergist and has an appointment scheduled for this week.  Patient declined medications for treatment of allergy symptoms and cough.  OTC cough and cold medications as needed  Increase fluids  Good hand hygiene  Humidifier   RTC PRN and if symptoms worsen or fail to improve  Patient verbalize understanding         Patient Instructions       R.I.C.E.    R.I.C.E. stands for Rest, Ice, Compression, and Elevation. Doing these things helps limit pain and swelling after an injury. R.I.C.E. also helps injuries heal faster. Use R.I.C.E. for sprains, strains, and severe bruises or bumps. Follow the tips on this handout and begin R.I.C.E. as soon as possible after an injury.  ? Rest  Pain is your bodys way of telling you to rest an injured area. Whether you have hurt an elbow, hand, foot, or knee, limiting its use will prevent further injury and help you heal.  ? Ice  Applying ice right after an injury helps prevent swelling and reduce pain. Dont place ice directly on your skin.  · Wrap a cold pack or bag of ice in a thin cloth. Place it over the injured area.  · Ice for 10 minutes every 3 hours. Dont ice for more than 20 minutes at a time.  ? Compression  Putting pressure (compression) on an injury helps prevent swelling and provides support.  · Wrap the injured area firmly with an elastic bandage. If your hand or foot tingles, becomes discolored, or feels cold to the touch, the bandage may be too tight. Rewrap it more loosely.  · If your bandage becomes too loose, rewrap it.  · Do not wear an elastic bandage overnight.  ? Elevation  Keeping an injury elevated helps  reduce swelling, pain, and throbbing. Elevation is most effective when the injury is kept elevated higher than the heart.     Call your healthcare provider if you notice any of the following:  · Fingers or toes feel numb, are cold to the touch, or change color  · Skin looks shiny or tight  · Pain, swelling, or bruising worsens and is not improved with elevation   Date Last Reviewed: 9/3/2015  © 6043-7471 [employer]. [address] [place] [address], [place] [address]. All rights reserved. This information is not intended as a substitute for professional medical care. Always follow your healthcare professional's instructions.

## 2020-05-19 NOTE — PATIENT INSTRUCTIONS

## 2020-06-15 ENCOUNTER — OFFICE VISIT (OUTPATIENT)
Dept: FAMILY MEDICINE | Facility: CLINIC | Age: 60
End: 2020-06-15
Payer: MEDICARE

## 2020-06-15 VITALS
TEMPERATURE: 98 F | SYSTOLIC BLOOD PRESSURE: 138 MMHG | RESPIRATION RATE: 18 BRPM | DIASTOLIC BLOOD PRESSURE: 86 MMHG | BODY MASS INDEX: 46.65 KG/M2 | HEART RATE: 118 BPM | OXYGEN SATURATION: 96 % | HEIGHT: 69 IN | WEIGHT: 315 LBS

## 2020-06-15 DIAGNOSIS — E78.1 HYPERTRIGLYCERIDEMIA: ICD-10-CM

## 2020-06-15 DIAGNOSIS — E55.9 VITAMIN D DEFICIENCY: ICD-10-CM

## 2020-06-15 DIAGNOSIS — M79.18 MYALGIA, OTHER SITE: ICD-10-CM

## 2020-06-15 DIAGNOSIS — Z12.5 ENCOUNTER FOR SCREENING FOR MALIGNANT NEOPLASM OF PROSTATE: ICD-10-CM

## 2020-06-15 DIAGNOSIS — M79.605 LEFT LEG PAIN: ICD-10-CM

## 2020-06-15 DIAGNOSIS — M10.9 ACUTE GOUT INVOLVING TOE OF LEFT FOOT, UNSPECIFIED CAUSE: Primary | ICD-10-CM

## 2020-06-15 DIAGNOSIS — M62.838 MUSCLE SPASM: ICD-10-CM

## 2020-06-15 DIAGNOSIS — Z13.1 SCREENING FOR DIABETES MELLITUS: ICD-10-CM

## 2020-06-15 DIAGNOSIS — R73.03 PREDIABETES: ICD-10-CM

## 2020-06-15 PROCEDURE — 99215 OFFICE O/P EST HI 40 MIN: CPT | Mod: PBBFAC,PO | Performed by: NURSE PRACTITIONER

## 2020-06-15 PROCEDURE — 99214 OFFICE O/P EST MOD 30 MIN: CPT | Mod: S$PBB,,, | Performed by: NURSE PRACTITIONER

## 2020-06-15 PROCEDURE — 99999 PR PBB SHADOW E&M-EST. PATIENT-LVL V: ICD-10-PCS | Mod: PBBFAC,,, | Performed by: NURSE PRACTITIONER

## 2020-06-15 PROCEDURE — 99214 PR OFFICE/OUTPT VISIT, EST, LEVL IV, 30-39 MIN: ICD-10-PCS | Mod: S$PBB,,, | Performed by: NURSE PRACTITIONER

## 2020-06-15 PROCEDURE — 99999 PR PBB SHADOW E&M-EST. PATIENT-LVL V: CPT | Mod: PBBFAC,,, | Performed by: NURSE PRACTITIONER

## 2020-06-15 RX ORDER — COLCHICINE 0.6 MG/1
TABLET ORAL
Qty: 30 TABLET | Refills: 0 | Status: SHIPPED | OUTPATIENT
Start: 2020-06-15 | End: 2021-10-29

## 2020-06-15 RX ORDER — NAPROXEN 500 MG/1
500 TABLET ORAL 2 TIMES DAILY
Qty: 30 TABLET | Refills: 1 | Status: SHIPPED | OUTPATIENT
Start: 2020-06-15 | End: 2021-10-29

## 2020-06-15 RX ORDER — METHOCARBAMOL 500 MG/1
500 TABLET, FILM COATED ORAL 4 TIMES DAILY
Qty: 40 TABLET | Refills: 0 | Status: SHIPPED | OUTPATIENT
Start: 2020-06-15 | End: 2020-06-25

## 2020-06-15 NOTE — PROGRESS NOTES
Patient ID: Elliott Ayala is a 60 y.o. male.    Chief Complaint:   Foot Pain (left foot )    Foot Pain  This is a new problem. The current episode started in the past 7 days. The problem occurs constantly. The problem has been gradually worsening. Associated symptoms include joint swelling, myalgias and numbness. Pertinent negatives include no abdominal pain, anorexia, arthralgias, change in bowel habit, chest pain, chills, congestion, coughing, diaphoresis, fatigue, fever, headaches, nausea, neck pain, rash, sore throat, swollen glands, urinary symptoms, vertigo, visual change, vomiting or weakness. The symptoms are aggravated by walking and standing. He has tried rest, ice and heat for the symptoms. The treatment provided mild relief.        Patient is new to me. Reviewed past medical and social history.    Past Medical History:   Diagnosis Date    Allergy      Past Surgical History:   Procedure Laterality Date    ACHILLES TENDON SURGERY       Current Outpatient Medications on File Prior to Visit   Medication Sig Dispense Refill    flunisolide 25 mcg, 0.025%, (NASALIDE) 25 mcg (0.025 %) Spry 2 sprays by Nasal route 2 (two) times daily.      meclizine (ANTIVERT) 25 mg tablet Take 1 tablet (25 mg total) by mouth 3 (three) times daily as needed. 30 tablet 0    montelukast (SINGULAIR) 10 mg tablet Take 1 tablet (10 mg total) by mouth every evening. 30 tablet 2    olopatadine (PATANASE) 0.6 % nasal spray 1 spray by Each Nare route 2 (two) times daily.      betamethasone valerate 0.1% (VALISONE) 0.1 % Oint Apply topically 2 (two) times daily. To affected area for 10 days 15 g 0     No current facility-administered medications on file prior to visit.        Review of Systems   Constitutional: Negative for chills, diaphoresis, fatigue and fever.   HENT: Negative for congestion and sore throat.    Respiratory: Negative for cough.    Cardiovascular: Negative for chest pain, palpitations and leg swelling.    Gastrointestinal: Negative for abdominal pain, anorexia, change in bowel habit, constipation, diarrhea, nausea and vomiting.   Musculoskeletal: Positive for gait problem, joint swelling and myalgias. Negative for arthralgias, back pain and neck pain.   Skin: Negative for rash.   Neurological: Positive for numbness. Negative for vertigo, weakness, light-headedness and headaches.   All other systems reviewed and are negative.      Objective:      Physical Exam  Vitals signs reviewed.   Constitutional:       Appearance: Normal appearance. He is well-developed.   HENT:      Head: Normocephalic and atraumatic.      Mouth/Throat:      Pharynx: No oropharyngeal exudate.   Eyes:      Conjunctiva/sclera: Conjunctivae normal.      Pupils: Pupils are equal, round, and reactive to light.   Neck:      Musculoskeletal: Normal range of motion.      Thyroid: No thyromegaly.      Vascular: No JVD.      Trachea: No tracheal deviation.   Cardiovascular:      Rate and Rhythm: Normal rate and regular rhythm.      Heart sounds: Normal heart sounds.   Pulmonary:      Effort: Pulmonary effort is normal.      Breath sounds: Normal breath sounds.   Abdominal:      General: Bowel sounds are normal. There is no distension.      Palpations: Abdomen is soft.      Tenderness: There is no abdominal tenderness.   Musculoskeletal:      Left foot: Decreased range of motion. Normal capillary refill. Tenderness, bony tenderness and swelling present. No crepitus, deformity or laceration.   Skin:     General: Skin is warm and dry.      Capillary Refill: Capillary refill takes less than 2 seconds.   Neurological:      General: No focal deficit present.      Mental Status: He is alert and oriented to person, place, and time.   Psychiatric:         Mood and Affect: Mood normal.         Assessment:       1. Acute gout involving toe of left foot, unspecified cause    2. Left leg pain    3. Muscle spasm    4. Vitamin D deficiency    5. Hypertriglyceridemia     6. BMI 45.0-49.9, adult    7. Screening for diabetes mellitus    8. Prediabetes     9. Myalgia, other site     10. Encounter for screening for malignant neoplasm of prostate         Plan:       Elliott was seen today for foot pain.    Diagnoses and all orders for this visit:    Acute gout involving toe of left foot, unspecified cause  -     colchicine (COLCRYS) 0.6 mg tablet; 2 tablets at onset of gout flare then 1 tablet 1 hour later    Left leg pain  -     X-Ray Ankle Complete Left; Future  -     US Lower Extremity Veins Bilateral; Future  -     naproxen (NAPROSYN) 500 MG tablet; Take 1 tablet (500 mg total) by mouth 2 (two) times daily.    Muscle spasm  -     methocarbamoL (ROBAXIN) 500 MG Tab; Take 1 tablet (500 mg total) by mouth 4 (four) times daily. for 10 days    Vitamin D deficiency    Hypertriglyceridemia  -     Lipid Panel; Future    BMI 45.0-49.9, adult  -     CBC auto differential; Future  -     Comprehensive metabolic panel; Future  -     TSH; Future  -     Vitamin D; Future  -     PSA, Screening; Future    Screening for diabetes mellitus  -     Hemoglobin A1C; Future    Prediabetes   -     Hemoglobin A1C; Future    Myalgia, other site   -     TSH; Future    Encounter for screening for malignant neoplasm of prostate   -     PSA, Screening; Future    X-ray today. Will call with results and discuss further plan of care.  Fasting labs scheduled. Will call patient with results.  BRANDY  Patient education provided.  All questions and concerns addressed  RTC PRN and if symptoms worsen or fail to improve  Patient verbalizes understanding        Patient Instructions       Treating Gout Attacks     Raising the joint above the level of your heart can help reduce gout symptoms.     Gout is a disease that affects the joints. It is caused by excess uric acid in your blood stream that may lead to crystals forming in your joints. Left untreated, it can lead to painful foot and joint deformities and even kidney  problems. But, by treating gout early, you can relieve pain and help prevent future problems. Gout can usually be treated with medication and proper diet. In severe cases, surgery may be needed.  Gout attacks are painful and often happen more than once. Taking medications may reduce pain and prevent attacks in the future. There are also some things you can do at home to relieve symptoms.  Medications for gout  Your healthcare provider may prescribe a daily medication to reduce levels of uric acid. Reducing your uric acid levels may help prevent gout attacks. Allopurinol is one commonly used medication taken daily to reduce uric acid levels. Other medications can help relieve pain and swelling during an acute attack. Medicines such as NSAIDs (nonsteroidal anti-inflammatory medicines), steroids, and colchicine may be prescribed for intermittent use to relieve an acute gout attack. Be sure to take your medication as directed.  What you can do  Below are some things you can do at home to relieve gout symptoms. Your healthcare provider may have other tips.  · Rest the painful joint as much as you can.  · Raise the painful joint so it is at a level higher than your heart.  · Use ice for 10 minutes every 1-2 hours as possible.  How can I prevent gout?  With a little effort, you may be able to prevent gout attacks in the future. Here are some things you can do:  · Avoid foods high in purines  ¨ Certain meats (red meat, processed meat, turkey)  ¨ Organ meats (kidney, liver, sweetbread)  ¨ Shellfish (lobster, crab, shrimp, scallop, mussel)  ¨ Certain fish (anchovy, sardine, herring, mackerel)  · Take any medications prescribed by your healthcare provider.  · Lose weight if you need to.  · Reduce high fructose corn syrup in meals and drinks.  · Reduce or eliminate consumption of alcohol, particularly beer, but also red wine and spirits.  · Control blood pressure, diabetes, and cholesterol.  · Drink plenty of water to help  flush uric acid from your body.  Date Last Reviewed: 2/1/2016  © 0560-8405 [employer]. [address] [place] [address], [place] [address]. All rights reserved. This information is not intended as a substitute for professional medical care. Always follow your healthcare professional's instructions.        R.I.C.E.    R.I.C.E. stands for Rest, Ice, Compression, and Elevation. Doing these things helps limit pain and swelling after an injury. R.I.C.E. also helps injuries heal faster. Use R.I.C.E. for sprains, strains, and severe bruises or bumps. Follow the tips on this handout and begin R.I.C.E. as soon as possible after an injury.  ? Rest  Pain is your bodys way of telling you to rest an injured area. Whether you have hurt an elbow, hand, foot, or knee, limiting its use will prevent further injury and help you heal.  ? Ice  Applying ice right after an injury helps prevent swelling and reduce pain. Dont place ice directly on your skin.  · Wrap a cold pack or bag of ice in a thin cloth. Place it over the injured area.  · Ice for 10 minutes every 3 hours. Dont ice for more than 20 minutes at a time.  ? Compression  Putting pressure (compression) on an injury helps prevent swelling and provides support.  · Wrap the injured area firmly with an elastic bandage. If your hand or foot tingles, becomes discolored, or feels cold to the touch, the bandage may be too tight. Rewrap it more loosely.  · If your bandage becomes too loose, rewrap it.  · Do not wear an elastic bandage overnight.  ? Elevation  Keeping an injury elevated helps reduce swelling, pain, and throbbing. Elevation is most effective when the injury is kept elevated higher than the heart.     Call your healthcare provider if you notice any of the following:  · Fingers or toes feel numb, are cold to the touch, or change color  · Skin looks shiny or tight  · Pain, swelling, or bruising worsens and is not improved with elevation   Date Last Reviewed:  9/3/2015  © 7536-1048 [employer]. [address] [place] [address], [place] [address]. All rights reserved. This information is not intended as a substitute for professional medical care. Always follow your healthcare professional's instructions.

## 2020-06-15 NOTE — PATIENT INSTRUCTIONS
Treating Gout Attacks     Raising the joint above the level of your heart can help reduce gout symptoms.     Gout is a disease that affects the joints. It is caused by excess uric acid in your blood stream that may lead to crystals forming in your joints. Left untreated, it can lead to painful foot and joint deformities and even kidney problems. But, by treating gout early, you can relieve pain and help prevent future problems. Gout can usually be treated with medication and proper diet. In severe cases, surgery may be needed.  Gout attacks are painful and often happen more than once. Taking medications may reduce pain and prevent attacks in the future. There are also some things you can do at home to relieve symptoms.  Medications for gout  Your healthcare provider may prescribe a daily medication to reduce levels of uric acid. Reducing your uric acid levels may help prevent gout attacks. Allopurinol is one commonly used medication taken daily to reduce uric acid levels. Other medications can help relieve pain and swelling during an acute attack. Medicines such as NSAIDs (nonsteroidal anti-inflammatory medicines), steroids, and colchicine may be prescribed for intermittent use to relieve an acute gout attack. Be sure to take your medication as directed.  What you can do  Below are some things you can do at home to relieve gout symptoms. Your healthcare provider may have other tips.  · Rest the painful joint as much as you can.  · Raise the painful joint so it is at a level higher than your heart.  · Use ice for 10 minutes every 1-2 hours as possible.  How can I prevent gout?  With a little effort, you may be able to prevent gout attacks in the future. Here are some things you can do:  · Avoid foods high in purines  ¨ Certain meats (red meat, processed meat, turkey)  ¨ Organ meats (kidney, liver, sweetbread)  ¨ Shellfish (lobster, crab, shrimp, scallop, mussel)  ¨ Certain fish (anchovy, sardine, herring,  mackerel)  · Take any medications prescribed by your healthcare provider.  · Lose weight if you need to.  · Reduce high fructose corn syrup in meals and drinks.  · Reduce or eliminate consumption of alcohol, particularly beer, but also red wine and spirits.  · Control blood pressure, diabetes, and cholesterol.  · Drink plenty of water to help flush uric acid from your body.  Date Last Reviewed: 2/1/2016  © 9700-3594 [employer]. [address] [place] [address], [place] [address]. All rights reserved. This information is not intended as a substitute for professional medical care. Always follow your healthcare professional's instructions.        R.I.C.E.    R.I.C.E. stands for Rest, Ice, Compression, and Elevation. Doing these things helps limit pain and swelling after an injury. R.I.C.E. also helps injuries heal faster. Use R.I.C.E. for sprains, strains, and severe bruises or bumps. Follow the tips on this handout and begin R.I.C.E. as soon as possible after an injury.  ? Rest  Pain is your bodys way of telling you to rest an injured area. Whether you have hurt an elbow, hand, foot, or knee, limiting its use will prevent further injury and help you heal.  ? Ice  Applying ice right after an injury helps prevent swelling and reduce pain. Dont place ice directly on your skin.  · Wrap a cold pack or bag of ice in a thin cloth. Place it over the injured area.  · Ice for 10 minutes every 3 hours. Dont ice for more than 20 minutes at a time.  ? Compression  Putting pressure (compression) on an injury helps prevent swelling and provides support.  · Wrap the injured area firmly with an elastic bandage. If your hand or foot tingles, becomes discolored, or feels cold to the touch, the bandage may be too tight. Rewrap it more loosely.  · If your bandage becomes too loose, rewrap it.  · Do not wear an elastic bandage overnight.  ? Elevation  Keeping an injury elevated helps reduce swelling, pain, and throbbing. Elevation  is most effective when the injury is kept elevated higher than the heart.     Call your healthcare provider if you notice any of the following:  · Fingers or toes feel numb, are cold to the touch, or change color  · Skin looks shiny or tight  · Pain, swelling, or bruising worsens and is not improved with elevation   Date Last Reviewed: 9/3/2015  © 6652-0574 [employer]. [address] [place] [address], [place] [id number]. All rights reserved. This information is not intended as a substitute for professional medical care. Always follow your healthcare professional's instructions.

## 2020-06-16 ENCOUNTER — TELEPHONE (OUTPATIENT)
Dept: FAMILY MEDICINE | Facility: CLINIC | Age: 60
End: 2020-06-16

## 2020-06-16 ENCOUNTER — HOSPITAL ENCOUNTER (OUTPATIENT)
Dept: RADIOLOGY | Facility: CLINIC | Age: 60
Discharge: HOME | End: 2020-06-16
Attending: NURSE PRACTITIONER
Payer: MEDICARE

## 2020-06-16 DIAGNOSIS — M79.605 LEFT LEG PAIN: ICD-10-CM

## 2020-06-16 PROCEDURE — 73610 XR ANKLE COMPLETE 3 VIEW LEFT: ICD-10-PCS | Mod: 26,LT,S$GLB, | Performed by: RADIOLOGY

## 2020-06-16 PROCEDURE — 73610 X-RAY EXAM OF ANKLE: CPT | Mod: 26,LT,S$GLB, | Performed by: RADIOLOGY

## 2020-06-16 PROCEDURE — 73610 X-RAY EXAM OF ANKLE: CPT | Mod: TC,FY,PO,LT

## 2020-06-19 ENCOUNTER — HOSPITAL ENCOUNTER (OUTPATIENT)
Dept: RADIOLOGY | Facility: CLINIC | Age: 60
Discharge: HOME | End: 2020-06-19
Attending: NURSE PRACTITIONER
Payer: MEDICARE

## 2020-06-19 ENCOUNTER — TELEPHONE (OUTPATIENT)
Dept: FAMILY MEDICINE | Facility: CLINIC | Age: 60
End: 2020-06-19

## 2020-06-19 DIAGNOSIS — M79.605 LEFT LEG PAIN: ICD-10-CM

## 2020-06-19 DIAGNOSIS — M25.572 PAIN OF JOINT OF LEFT ANKLE AND FOOT: ICD-10-CM

## 2020-06-19 DIAGNOSIS — M77.50 TENDINITIS OF ANKLE: Primary | ICD-10-CM

## 2020-06-19 NOTE — TELEPHONE ENCOUNTER
Pt scheduled Wednesday, 06/24/2020 at 1000 with BIRDIE Rice MD for further evaluation. Pt verbalized understanding. Unable to schedule pt MRI d/t pt being claustrophobic. Please advise.

## 2020-06-19 NOTE — TELEPHONE ENCOUNTER
----- Message from Trace Coy MD sent at 6/19/2020 12:54 PM CDT -----  Please notify Mr Ayala that further evaluation by Ortho will be arrange and also MRI should be done.  US was cancelled due to low risk for DVT.

## 2020-06-22 NOTE — TELEPHONE ENCOUNTER
MRI canceled and Pt already scheduled Wednesday, 06/24/2020 at 1000 with ALVIN Rice MD for splint.

## 2020-06-24 ENCOUNTER — OFFICE VISIT (OUTPATIENT)
Dept: SPORTS MEDICINE | Facility: CLINIC | Age: 60
End: 2020-06-24
Payer: MEDICARE

## 2020-06-24 VITALS
DIASTOLIC BLOOD PRESSURE: 86 MMHG | TEMPERATURE: 97 F | HEIGHT: 69 IN | SYSTOLIC BLOOD PRESSURE: 138 MMHG | BODY MASS INDEX: 46.65 KG/M2 | WEIGHT: 315 LBS | OXYGEN SATURATION: 96 % | HEART RATE: 80 BPM

## 2020-06-24 DIAGNOSIS — M67.88 ACHILLES TENDINOSIS: Primary | ICD-10-CM

## 2020-06-24 DIAGNOSIS — M76.62 ACHILLES TENDINITIS, LEFT LEG: ICD-10-CM

## 2020-06-24 DIAGNOSIS — M77.50 TENDINITIS OF ANKLE: ICD-10-CM

## 2020-06-24 PROCEDURE — 99999 PR PBB SHADOW E&M-EST. PATIENT-LVL V: CPT | Mod: PBBFAC,,, | Performed by: FAMILY MEDICINE

## 2020-06-24 PROCEDURE — 99214 OFFICE O/P EST MOD 30 MIN: CPT | Mod: S$PBB,25,, | Performed by: FAMILY MEDICINE

## 2020-06-24 PROCEDURE — 76882 US LMTD JT/FCL EVL NVASC XTR: CPT | Mod: PBBFAC,PO | Performed by: FAMILY MEDICINE

## 2020-06-24 PROCEDURE — 99215 OFFICE O/P EST HI 40 MIN: CPT | Mod: PBBFAC,PO,25 | Performed by: FAMILY MEDICINE

## 2020-06-24 PROCEDURE — 76882 PR  US,EXTREMITY,NONVASCULAR,REAL-TIME IMAGE,LIMITED: ICD-10-PCS | Mod: 26,S$PBB,, | Performed by: FAMILY MEDICINE

## 2020-06-24 PROCEDURE — 76882 US LMTD JT/FCL EVL NVASC XTR: CPT | Mod: 26,S$PBB,, | Performed by: FAMILY MEDICINE

## 2020-06-24 PROCEDURE — 99999 PR PBB SHADOW E&M-EST. PATIENT-LVL V: ICD-10-PCS | Mod: PBBFAC,,, | Performed by: FAMILY MEDICINE

## 2020-06-24 PROCEDURE — 99214 PR OFFICE/OUTPT VISIT, EST, LEVL IV, 30-39 MIN: ICD-10-PCS | Mod: S$PBB,25,, | Performed by: FAMILY MEDICINE

## 2020-06-24 RX ORDER — MELOXICAM 15 MG/1
15 TABLET ORAL DAILY
Qty: 30 TABLET | Refills: 2 | Status: SHIPPED | OUTPATIENT
Start: 2020-06-24 | End: 2021-06-01

## 2020-06-24 NOTE — LETTER
June 25, 2020      Trace Coy MD  [address] [facility]  [place] [address]           [place] [facility]  [address] [facility] [address]  [facility] [address]  Phone: [phone]          Patient: Elliott Ayala   MR Number: [id number]   YOB: 1960   Date of Visit: 6/24/2020       Dear Dr. Trace Coy:    Thank you for referring Elliott Ayala to me for evaluation. Attached you will find relevant portions of my assessment and plan of care.    If you have questions, please do not hesitate to call me. I look forward to following Elliott Ayala along with you.    Sincerely,    Sharan Rice MD    Enclosure  CC:  No Recipients    If you would like to receive this communication electronically, please contact [email or web address] or [phone] to request more information on [employer] access.    For providers and/or their staff who would like to refer a patient to [facility], please contact us through our one-stop-shop provider referral line, [facility] Dodge, at [phone].    If you feel you have received this communication in error or would no longer like to receive these types of communications, please e-mail [email or web address]

## 2020-06-24 NOTE — PROGRESS NOTES
Subjective:          Chief Complaint: Elliott Ayala is a 60 y.o. male who had concerns including Follow-up (problems regarding left tendon to ankle. may need MRI. ).    Patient here today with complaints of pain in the posterior left ankle for the last three weeks.  Three weeks ago he was doing work on his house where he was twisting his ankle several times and during one of them he felt a pop in the posterior area of his left ankle.  He had his foot planted.  He does not believe he forcefully inverted his ankle he is not sure exactly what happened pain and swelling developed directly afterwards and he had difficulty putting weight on his ankle.  He is walking on crutches in clinic today.      Review of Systems   Constitution: Negative for chills and decreased appetite.   HENT: Negative for congestion and sore throat.    Eyes: Negative for blurred vision.   Cardiovascular: Negative for chest pain, dyspnea on exertion and palpitations.   Respiratory: Negative for cough and shortness of breath.    Skin: Negative for rash.   Neurological: Negative for difficulty with concentration, disturbances in coordination and headaches.   Psychiatric/Behavioral: Negative for altered mental status, depression, hallucinations, memory loss and suicidal ideas.                   Objective:        General: Elliott is well-developed, well-nourished, appears stated age, in no acute distress, alert and oriented to time, place and person.     General    Nursing note and vitals reviewed.  Constitutional: He is oriented to person, place, and time. He appears well-developed and well-nourished.   HENT:   Nose: Nose normal.   Eyes: EOM are normal. Pupils are equal, round, and reactive to light.   Neck: Normal range of motion. Neck supple.   Cardiovascular: Normal rate.    Pulmonary/Chest: Effort normal.   Abdominal: Soft.   Neurological: He is alert and oriented to person, place, and time. He has normal reflexes.   Psychiatric: He has a normal  mood and affect. His behavior is normal. Judgment and thought content normal.         Right Ankle/Foot Exam   Right ankle exam is normal.    Inspection   Scars: present    Left Ankle/Foot Exam     Pain   The patient exhibits pain of the Achilles tendon.    Swelling   The patient is swollen on the Achilles tendon.    Tenderness   The patient is tender to palpation of the Achilles tendon.    Range of Motion   Ankle Joint  Dorsiflexion: normal     Subtalar Joint   Inversion: normal   Eversion: normal   Black Test:  positive    Tests   Anterior drawer: negative  Varus tilt: negative    Other   Foot Crepitus:  absent  Ankle Crepitus: absent      Patient had x-rays ordered by his PCP.  Images reviewed in clinic by me.  They show the presence of an os trigone.  No clear degenerative changes of the ankle joint.  There is a significant heel spur on the posterior calcaneus near the insertion point of the Achilles tendon.  There are no acute fractures and no soft tissue swelling noted.    Due to patient's weakness on Black squeeze test on physical exam as well as history suspicious for Achilles rupture a diagnostic ultrasound was performed in clinic today.  Performed interpreted by me.  Ultrasound revealed some defects of the distal Achilles tendon near the insertion point but intact mechanism.  Defect did not worsen with dynamic motion and was demonstrated clearly on ultrasound.  Images these findings were saved store to the machine.          Assessment:       Encounter Diagnoses   Name Primary?    Tendinitis of ankle     BMI 45.0-49.9, adult     Achilles tendinosis Yes    Achilles tendinitis, left leg           Plan:         Discussed treatment options with patient including rest, heat, ice, anti-inflammatories both over-the-counter prescription, bracing, physical therapy both formal and at home, injection therapy with corticosteroids and biological agents, and surgical options.  Patient elected for attending  formal physical therapy for Achilles tendinopathy and wearing a walking boot for support and additional weight-bearing.  Patient states he still has a boot from his previous Achilles surgery that he can use.    Will have patient follow-up in approximately six weeks.  If pain is not significantly improved we discussed the option of using PRP or prolonged therapy as a treatment option for his Achilles tendinopathy.  The issue of cost was discussed as well.                  Patient questionnaires may have been collected.

## 2020-06-25 NOTE — PATIENT INSTRUCTIONS
Understanding Achilles Tendonitis    Achilles tendonitis is an overuse injury. It results in inflammation of the Achilles tendon. This tendon is found on the back of the ankle. It links the calf muscle to the heel bone. It helps you do pushing-off movements like running or standing on your toes.     How to say it  [id number]   What causes Achilles tendonitis?  Achilles tendonitis can happen if you do an activity like running, walking, or jumping too much. This overuse can strain, or pull, the tendon. It may lead to minor tearing of the tendon. An injury to the lower leg or foot can also cause it.  If you dont warm up before taking part in sports such as basketball, you are more likely to suffer from this condition. You are also more prone to it if you do too much of such an activity too quickly. Proper training and rest can help prevent it.  Symptoms of Achilles tendonitis  The main symptom of Achilles tendonitis is pain. This pain mostly happens when you move the ankle. The tendon may also feel stiff after a period of no activity, such as sleeping. It may also become swollen. You may hear a crackling sound when you move your ankle.  Treatment for Achilles tendonitis  Symptoms often get better after starting treatment. A full recovery may take several months. Treatments include:  · Rest. You should stop or change the activity that caused the injury. The tendon will then have time to heal.  · Cold or heat pack. These help reduce pain and swelling.  · Prescription or over-the-counter pain medicines. These help reduce pain and swelling.  · Shoe inserts. These devices can reduce strain on the Achilles tendon when you move. You may then feel less pain.  · Stretching and strengthening exercises. Certain exercises can help you regain flexibility and strength in your Achilles tendon.  · Surgery. This option can fix the injured tendon. But you dont often need it unless other treatments dont work.     When  to call your healthcare provider   Call your healthcare provider right away if you have any of these:  · Fever of 100.4°F (38°C) or higher, or as directed  · Pain that gets worse  · Symptoms that dont get better, or get worse  · New symptoms    Date Last Reviewed: 3/10/2016  © 8489-2738 [employer]. [address] [place] [address], [place] [address]. All rights reserved. This information is not intended as a substitute for professional medical care. Always follow your healthcare professional's instructions.

## 2020-06-26 ENCOUNTER — TELEPHONE (OUTPATIENT)
Dept: FAMILY MEDICINE | Facility: CLINIC | Age: 60
End: 2020-06-26

## 2020-06-26 NOTE — TELEPHONE ENCOUNTER
Called pt regarding msg. Pt stating the boot he has is not working and that his wife would come to clinic to pick this up.

## 2020-06-26 NOTE — TELEPHONE ENCOUNTER
----- Message from [address] sent at 6/26/2020  3:08 PM CDT -----  Regarding: boot for left foot  Contact: self  Type: Needs Medical Advice  Who Called:  self   Symptoms (please be specific):    How long has patient had these symptoms:    Pharmacy name and phone #:    Best Call Back Number: [phone]  Additional Information: Patient requesting to get a boot for the left foot.

## 2020-07-01 ENCOUNTER — CLINICAL SUPPORT (OUTPATIENT)
Dept: REHABILITATION | Facility: HOSPITAL | Age: 60
End: 2020-07-01
Payer: MEDICARE

## 2020-07-01 DIAGNOSIS — M67.88 ACHILLES TENDINOSIS: ICD-10-CM

## 2020-07-01 DIAGNOSIS — R26.9 GAIT ABNORMALITY: ICD-10-CM

## 2020-07-01 PROCEDURE — 97161 PT EVAL LOW COMPLEX 20 MIN: CPT | Mod: PN | Performed by: PHYSICAL THERAPIST

## 2020-07-01 PROCEDURE — 97110 THERAPEUTIC EXERCISES: CPT | Mod: PN | Performed by: PHYSICAL THERAPIST

## 2020-07-01 NOTE — PLAN OF CARE
[facility]  Physical Therapy Initial Evaluation    Name: Elliott Ayala  Clinic Number: [id number]    Therapy Diagnosis:   Encounter Diagnoses   Name Primary?    Achilles tendinosis     Gait abnormality      Physician: Sharan Rice MD    Physician Orders: PT Eval and Treat   Medical Diagnosis from Referral: Achilles tendinosis  Evaluation Date: 7/1/2020  Authorization Period Expiration: 12/31/2020  Plan of Care Expiration: 8/21/2020  Visit # / Visits authorized: 1/ 20    Time In: 1130  Time Out: 1215  Total Billable Time: 45 minutes    Precautions: Standard    Subjective   Date of onset: 5/2020  History of current condition - Elliott reports: the onset of pain while stepping to get a ball that was rolling away. He reports he felt a pop behind his left ankle and later began to felling swelling and pain. He reports he began having difficulty with weight bearing. He presents to PT ambulating NWB on his left LE using axillary crutches.      Medical History:   Past Medical History:   Diagnosis Date    Allergy        Surgical History:   Elliott Ayala  has a past surgical history that includes Achilles tendon surgery.    Medications:   Elliott has a current medication list which includes the following prescription(s): betamethasone valerate 0.1%, colchicine, flunisolide 25 mcg (0.025%), meclizine, meloxicam, montelukast, naproxen, and olopatadine.    Allergies:   Review of patient's allergies indicates:   Allergen Reactions    Codeine     Penicillins         Imaging, X-rays: see epic    Prior Therapy: PT  Social History:  lives with their spouse  Occupation: Disabled  Prior Level of Function: Independent  Current Level of Function: Decreased ability to perform ADL and limited tolerance to standing and walking.    Pain:  Current 4/10, worst 9/10, best 4/10   Location: left ankle  Description: Tight  Aggravating Factors: Standing and Walking  Easing Factors: rest    Pts goals: Return to  PLOF    Objective     Posture: Decreased lumbar lordosis and forward head in standing  Palpation: Moderate point tenderness noted with palpation of the Achilles tendon  Sensation: Intact    Range of Motion/Strength:      ROM: Left Right   Ankle Dorsiflexion 0 degrees 4 degrees   Ankle Plantarflexion 40 degrees 40 degrees   Ankle Inversion 26 degrees 30 degrees   Ankle Eversion 6 degrees 12 degrees       MMT: Left Right   Ankle Dorsiflexion 5/5 5/5   Ankle Plantarflexion 5/5 5/5   Ankle Inversion 4+/5 5/5   Ankle Eversion 4+/5 5/5       Girth Measurements Left Right   Figure 8 59.0 cm 58.8 cm      Special Tests Left Right   Talar tilt negative negative   Anterior drawer negative negative            Gait With AD.  Device Used -  Axillary crutches   Analysis Patient ambulating NWB with axillary crutches       Other:     CMS Impairment/Limitation/Restriction for FOTO  Survey    Therapist reviewed FOTO scores for Elliott Ayala on 7/1/2020.   FOTO documents entered into [employer] - see Media section.    Limitation Score: 53%  Category: Mobility    Current : CK = at least 40% but < 60% impaired, limited or restricted  Goal: CJ = at least 20% but < 40% impaired, limited or restricted         TREATMENT   Treatment Time In: 1145  Treatment Time Out: 1215  Total Treatment time separate from Evaluation: 30 minutes    Elliott received therapeutic exercises to develop ROM and flexibility for 30 minutes including:    Long sitting HSS 3 x 30 sec  Long sitting GSS 3 x 30 sec  Ankle pumps 3 x 10  Inversion/eversion 3 x 10  Seated heel slides 3 x 10    Home Exercises and Patient Education Provided    Education provided:   - HEP    Written Home Exercises Provided: yes.  Exercises were reviewed and Elliott was able to demonstrate them prior to the end of the session.  Elliott demonstrated good  understanding of the education provided.     See EMR under Patient Instructions for exercises provided 7/1/2020.    Assessment   Elliott is a 60 y.o. male  referred to outpatient Physical Therapy with a medical diagnosis of  Achilles tendinosis. Pt presents with     1. Left ankle pain  2. Decreased ankle ROM  3. Decreased ankle strength  4. Gait abnormality    Pt prognosis is Good.   Pt will benefit from skilled outpatient Physical Therapy to address the deficits stated above and in the chart below, provide pt/family education, and to maximize pt's level of independence.     Plan of care discussed with patient: Yes  Pt's spiritual, cultural and educational needs considered and patient is agreeable to the plan of care and goals as stated below:     Anticipated Barriers for therapy: none    Medical Necessity is demonstrated by the following  History  Co-morbidities and personal factors that may impact the plan of care Co-morbidities:   high BMI    Personal Factors:   no deficits     low   Examination  Body Structures and Functions, activity limitations and participation restrictions that may impact the plan of care Body Regions:   lower extremities    Body Systems:    ROM  strength  gait    Participation Restrictions:   none    Activity limitations:   Learning and applying knowledge  no deficits    General Tasks and Commands  no deficits    Communication  no deficits    Mobility  no deficits    Self care  no deficits    Domestic Life  no deficits    Interactions/Relationships  no deficits    Life Areas  no deficits    Community and Social Life  no deficits         low   Clinical Presentation stable and uncomplicated low   Decision Making/ Complexity Score: low     Goals:  Short Term Goals (STG) # weeks Goal Review Date Reviewed Date Met   1. The patient will begin a written HEP 1 Initial 7/1/2020    2. Increase ankle dorsiflexion to 5 degrees 3 Initial 7/1/2020    3. Decrease soft tissue tenderness to mild 3 Initial 7/1/2020      Long Term Goals (LTG) # weeks Goal Review Date Reviewed Date Met   1. The patient will be independent with his HEP for maintenance 6 Initial  7/1/2020    2. Increase ankle dorsiflexion to 8 degrees 6 Initial 7/1/2020    3. Decrease FOTO score to 39 % 6 Initial 7/1/2020    4. Patient will be able to ascend/descend 20 stairs/steps without c/o pain 6 Initial 7/1/2020        Plan   Plan of care Certification: 7/1/2020 to 8/21/2020.    Outpatient Physical Therapy 2 times weekly for 6 weeks to include the following interventions: Gait Training, Manual Therapy, Neuromuscular Re-ed, Patient Education, Therapeutic Activites and Therapeutic Exercise.     Delfin Llanes, PT

## 2020-07-07 ENCOUNTER — CLINICAL SUPPORT (OUTPATIENT)
Dept: REHABILITATION | Facility: HOSPITAL | Age: 60
End: 2020-07-07
Payer: MEDICARE

## 2020-07-07 DIAGNOSIS — R26.9 GAIT ABNORMALITY: ICD-10-CM

## 2020-07-07 PROCEDURE — 97140 MANUAL THERAPY 1/> REGIONS: CPT | Mod: PN,CQ

## 2020-07-07 PROCEDURE — 97110 THERAPEUTIC EXERCISES: CPT | Mod: PN,CQ

## 2020-07-07 NOTE — PROGRESS NOTES
"  Physical Therapy Daily Treatment Note     Name: Elliott Ayala  Clinic Number: [id number]    Therapy Diagnosis:   Encounter Diagnosis   Name Primary?    Gait abnormality      Physician: Sharan Rice MD    Visit Date: 7/7/2020  Physician Orders: PT Eval and Treat   Medical Diagnosis from Referral: Achilles tendinosis  Evaluation Date: 7/1/2020  Authorization Period Expiration: 12/31/2020  Plan of Care Expiration: 8/21/2020  Visit # / Visits authorized: 2/ 20 (POC 2 x 6)      Time In: 149  Time Out: 233  Total Billable Time: 46 minutes    Precautions: Standard and Fall    Subjective     Pt reports: "I can't control it" re: when performing CW/CCW exercise.  He was compliant with home exercise program.  Response to previous treatment: soreness  Functional change:     Pain: 2/10  Location: left feet      Objective     Elliott received therapeutic exercises to develop strength, ROM and flexibility for 36 minutes including:    Long sitting HSS 3 x 30 sec  Long sitting GSS 3 x 30 sec  Ankle pumps 3 x 10  Inversion/eversion 3 x 10  Alphabet x 1  Seated heel slides 2 x 10  Seated HR x 30  Seated TR x 30  Towel scrunch x 30    Elliott received the following manual therapy techniques: STM L medial ankle, gentle light rubbing to achilles region for 10 minutes.    Home Exercises Provided and Patient Education Provided     Education provided:   - cont HEP, desensitization at heel region    Written Home Exercises Provided: Patient instructed to cont prior HEP.  Exercises were reviewed and Elliott was able to demonstrate them prior to the end of the session.  Elliott demonstrated good  understanding of the education provided.     See EMR under Patient Instructions for exercises provided prior visit.    Assessment     Pt arrived w/B axillary crutches and L boot.  TP medial achilles region, which decreased in size and pain intensity upon completion of MT.  Assistance needed with CW circles due to difficulty completing [place].  Instructed pt " to perform exercises and stretches just before reaching pain.  Pt verbalized understanding.  Good overall tolerance for activity.      Elliott is progressing well towards his goals.   Pt prognosis is Good.     Pt will continue to benefit from skilled outpatient physical therapy to address the deficits listed in the problem list box on initial evaluation, provide pt/family education and to maximize pt's level of independence in the home and community environment.     Pt's spiritual, cultural and educational needs considered and pt agreeable to plan of care and goals.    Anticipated barriers to physical therapy: none    Goals:  Short Term Goals (STG) # weeks Goal Review Date Reviewed Date Met   1. The patient will begin a written HEP 1 met 7/7/2020 7/7/2020   2. Increase ankle dorsiflexion to 5 degrees 3 progressing 7/7/2020       3. Decrease soft tissue tenderness to mild 3 progressing 7/7/2020          Long Term Goals (LTG) # weeks Goal Review Date Reviewed Date Met   1. The patient will be independent with his HEP for maintenance 6 progressing 7/7/2020       2. Increase ankle dorsiflexion to 8 degrees 6 progressing 7/7/2020       3. Decrease FOTO score to 39 % 6 progressing 7/7/2020       4. Patient will be able to ascend/descend 20 stairs/steps without c/o pain 6 progressing 7/7/2020             Plan     Cont per POC, stretches, ROM    Mei Nielsen, PTA

## 2020-07-09 ENCOUNTER — CLINICAL SUPPORT (OUTPATIENT)
Dept: REHABILITATION | Facility: HOSPITAL | Age: 60
End: 2020-07-09
Payer: MEDICARE

## 2020-07-09 DIAGNOSIS — R26.9 GAIT ABNORMALITY: ICD-10-CM

## 2020-07-09 PROCEDURE — 97140 MANUAL THERAPY 1/> REGIONS: CPT | Mod: PN,CQ

## 2020-07-09 PROCEDURE — 97110 THERAPEUTIC EXERCISES: CPT | Mod: PN,CQ

## 2020-07-09 NOTE — PROGRESS NOTES
"  Physical Therapy Daily Treatment Note     Name: Elliott Ayala  Clinic Number: [id number]    Therapy Diagnosis:   Encounter Diagnosis   Name Primary?    Gait abnormality      Physician: Sharan Rice MD    Visit Date: 7/9/2020  Physician Orders: PT Eval and Treat   Medical Diagnosis from Referral: Achilles tendinosis  Evaluation Date: 7/1/2020  Authorization Period Expiration: 12/31/2020  Plan of Care Expiration: 8/21/2020  Visit # / Visits authorized: 3/ 20 (POC 2 x 6)    Time In: 1246  Time Out: 1330  Total Billable Time: 44 minutes    Precautions: Standard and Fall    Subjective     Pt reports:  "not really a pain, just uncomfortable". Walking with L boot NWB and B axillary crutches.   He was compliant with home exercise program, sporadic.  Response to previous treatment: soreness  Functional change: none stated    Pain: 2/10  Location: left foot      Objective     Elliott received therapeutic exercises to develop strength, ROM and flexibility for 34 minutes including:    Long sitting HSS 3 x 30 sec  Long sitting GSS 3 x 30 sec  Ankle pumps 3 x 10  Inversion/eversion 3 x 10  Alphabet x 1  Seated heel slides 2 x 10  Seated HR x 30  Seated TR x 30  Towel scrunch x 30    Elliott received the following manual therapy techniques: STM L medial ankle, gentle light rubbing to achilles region for 10 minutes.    Home Exercises Provided and Patient Education Provided     Education provided:   - cont HEP, desensitization at heel region    Written Home Exercises Provided: Patient instructed to cont prior HEP.  Exercises were reviewed and Elliott was able to demonstrate them prior to the end of the session.  Elliott demonstrated good  understanding of the education provided.     See EMR under Patient Instructions for exercises provided prior visit.    Assessment     Improving tolerance for manual but initial withdrawal to touch. Improving ankle circles without needs for tc today.   VC fpr derotation L lower leg with gait.     Elliott is " progressing well towards his goals.   Pt prognosis is Good.     Pt will continue to benefit from skilled outpatient physical therapy to address the deficits listed in the problem list box on initial evaluation, provide pt/family education and to maximize pt's level of independence in the home and community environment.     Pt's spiritual, cultural and educational needs considered and pt agreeable to plan of care and goals.    Anticipated barriers to physical therapy: none    Goals:  Short Term Goals (STG) # weeks Goal Review Date Reviewed Date Met   1. The patient will begin a written HEP 1 met 7/7/2020 7/7/2020   2. Increase ankle dorsiflexion to 5 degrees 3 progressing 7/9/2020       3. Decrease soft tissue tenderness to mild 3 progressing 7/9/2020          Long Term Goals (LTG) # weeks Goal Review Date Reviewed Date Met   1. The patient will be independent with his HEP for maintenance 6 progressing 7/9/2020       2. Increase ankle dorsiflexion to 8 degrees 6 progressing 7/9/2020       3. Decrease FOTO score to 39 % 6 progressing 7/9/2020       4. Patient will be able to ascend/descend 20 stairs/steps without c/o pain 6 progressing 7/9/2020             Plan     Cont per POC, stretches, ROM    Jenny Linder, PTA

## 2020-07-14 ENCOUNTER — CLINICAL SUPPORT (OUTPATIENT)
Dept: REHABILITATION | Facility: HOSPITAL | Age: 60
End: 2020-07-14
Payer: MEDICARE

## 2020-07-14 DIAGNOSIS — R26.9 GAIT ABNORMALITY: ICD-10-CM

## 2020-07-14 PROCEDURE — 97140 MANUAL THERAPY 1/> REGIONS: CPT | Mod: PN,CQ

## 2020-07-14 PROCEDURE — 97110 THERAPEUTIC EXERCISES: CPT | Mod: PN,CQ

## 2020-07-14 NOTE — PROGRESS NOTES
"  Physical Therapy Daily Treatment Note     Name: Elliott Ayala  Clinic Number: [id number]    Therapy Diagnosis:   Encounter Diagnosis   Name Primary?    Gait abnormality      Physician: Sharan Rice MD    Visit Date: 7/14/2020  Physician Orders: PT Eval and Treat   Medical Diagnosis from Referral: Achilles tendinosis  Evaluation Date: 7/1/2020  Authorization Period Expiration: 12/31/2020  Plan of Care Expiration: 8/21/2020  Visit # / Visits authorized: 4/ 20 (POC 2 x 6)    Time In: 1211p  Time Out: 1258p  Total Billable Time: 47 minutes    Precautions: Standard and Fall    Subjective     Pt reports: feels irritated like a "bruise" in the posterior calcaneous region.  He was compliant with home exercise program, sporadic.  Response to previous treatment: soreness  Functional change: none stated    Pain: 2/10  Location: left foot      Objective     Elliott received therapeutic exercises to develop strength, ROM and flexibility for 37 minutes including:    Long sitting HSS 3 x 30 sec NP  Long sitting GSS 3 x 30 sec  Ankle pumps 3 x 10  Inversion/eversion 3 x 10  Alphabet x 1  Seated heel slides 2 x 10 (VC align foot with knee)  Seated HR x 30  Seated TR x 30  Towel scrunch x 30  + Ankle 3 way (dorsiflexion, inversion, eversion) with RTB 2 x 10    Elliott received the following manual therapy techniques: STM to LLE including M/L ankle, gastroc, and achilles regions. IASTM to medial lower calf and achilles region for 10 minutes.    Home Exercises Provided and Patient Education Provided     Education provided:   - cont HEP, desensitization at heel region    Written Home Exercises Provided: Patient instructed to cont prior HEP.  Exercises were reviewed and Elliott was able to demonstrate them prior to the end of the session.  Elliott demonstrated good  understanding of the education provided.     See EMR under Patient Instructions for exercises provided prior visit.    Assessment     Pt continues to ambulate with L boot and " crutches WBAT. Pt presents with very sensitive touch to posterior foot near calcaneous and TPs in the calf and medial foot around Achilles tendon. TP and sensitivity lessened after STM to posterior foot, ankle, calf regions. Performed all exercises with limited ROM but no increase in pain.     Elliott is progressing well towards his goals.   Pt prognosis is Good.     Pt will continue to benefit from skilled outpatient physical therapy to address the deficits listed in the problem list box on initial evaluation, provide pt/family education and to maximize pt's level of independence in the home and community environment.     Pt's spiritual, cultural and educational needs considered and pt agreeable to plan of care and goals.    Anticipated barriers to physical therapy: none    Goals:  Short Term Goals (STG) # weeks Goal Review Date Reviewed Date Met   1. The patient will begin a written HEP 1 met 7/7/2020 7/7/2020   2. Increase ankle dorsiflexion to 5 degrees 3 progressing 7/14/2020       3. Decrease soft tissue tenderness to mild 3 progressing 7/14/2020          Long Term Goals (LTG) # weeks Goal Review Date Reviewed Date Met   1. The patient will be independent with his HEP for maintenance 6 progressing 7/14/2020       2. Increase ankle dorsiflexion to 8 degrees 6 progressing 7/14/2020       3. Decrease FOTO score to 39 % 6 progressing 7/14/2020       4. Patient will be able to ascend/descend 20 stairs/steps without c/o pain 6 progressing 7/14/2020             Plan     Cont per POC, stretches, ROM    I certify that I was present in the room directing the student in service delivery and guiding them using my skilled judgment. As the co-signing therapist I have reviewed the students documentation and am responsible for the treatment, assessment, and plan.     Marilu Mendenhall, LOUIS Nielsen, PTA

## 2020-07-16 ENCOUNTER — DOCUMENTATION ONLY (OUTPATIENT)
Dept: REHABILITATION | Facility: HOSPITAL | Age: 60
End: 2020-07-16

## 2020-07-16 ENCOUNTER — CLINICAL SUPPORT (OUTPATIENT)
Dept: REHABILITATION | Facility: HOSPITAL | Age: 60
End: 2020-07-16
Payer: MEDICARE

## 2020-07-16 DIAGNOSIS — R26.9 GAIT ABNORMALITY: ICD-10-CM

## 2020-07-16 PROCEDURE — 97140 MANUAL THERAPY 1/> REGIONS: CPT | Mod: PN,CQ

## 2020-07-16 PROCEDURE — 97110 THERAPEUTIC EXERCISES: CPT | Mod: PN,CQ

## 2020-07-16 NOTE — PROGRESS NOTES
6th visit PT-PTA face-face discussion with TIRSO Llanes re: patient status, POC, and plan for progression done 7/7/20.  Mei Nielsen, PTA

## 2020-07-21 ENCOUNTER — CLINICAL SUPPORT (OUTPATIENT)
Dept: REHABILITATION | Facility: HOSPITAL | Age: 60
End: 2020-07-21
Payer: MEDICARE

## 2020-07-21 DIAGNOSIS — R26.9 GAIT ABNORMALITY: ICD-10-CM

## 2020-07-21 DIAGNOSIS — M67.88 ACHILLES TENDINOSIS: Primary | ICD-10-CM

## 2020-07-21 PROCEDURE — 97110 THERAPEUTIC EXERCISES: CPT | Mod: PN | Performed by: PHYSICAL THERAPIST

## 2020-07-21 NOTE — PROGRESS NOTES
Physical Therapy Daily Treatment Note     Name: Elliott Ayala  Clinic Number: [id number]    Therapy Diagnosis:   Encounter Diagnoses   Name Primary?    Gait abnormality     Achilles tendinosis Yes     Physician: Sharan Rice MD    Visit Date: 7/21/2020  Physician Orders: PT Eval and Treat   Medical Diagnosis from Referral: Achilles tendinosis  Evaluation Date: 7/1/2020  Authorization Period Expiration: 12/31/2020  Plan of Care Expiration: 8/21/2020  Visit # / Visits authorized: 6/ 20 (POC 6/12)    Time In: 1245  Time Out: 1330  Total Billable Time: 45 minutes    Precautions: Standard and Fall    Subjective     Pt reports: he is improving, pain about the same but able to get around  He was compliant with home exercise program, sporadic.  Response to previous treatment: soreness  Functional change: WBAT on L LE with AC    Pain: 2/10  Location: left foot      Objective     Elliott received therapeutic exercises to develop strength, ROM and flexibility for 35 minutes including:    Long sitting HSS 3 x 30 sec   Long sitting GSS 3 x 30 sec  Ankle pumps 3 x 10  Inversion/eversion 3 x 10  Alphabet x 1  Seated heel slides 2 x 10 (VC align foot with knee) NP  Seated HR x 30  Seated TR x 30 NP  Towel scrunch x 30  + Ankle 3 way (dorsiflexion, inversion, eversion) with RTB 2 x 10  +Wobble board: cw, ccw, sup, pro, df, pf x 30 each, assistance with sup/pro    Elliott received the following manual therapy techniques: Instrument Assisted Soft Tissue Mobilization to the left gastrocnemius for 10 minutes        Home Exercises Provided and Patient Education Provided     Education provided:   - cont HEP, desensitization at heel region    Written Home Exercises Provided: Patient instructed to cont prior HEP.  Exercises were reviewed and Elliott was able to demonstrate them prior to the end of the session.  Elliott demonstrated good  understanding of the education provided.     See EMR under Patient Instructions for exercises provided prior  visit.    Assessment     Patient tolerated treatment well without report of symptoms    Elliott is progressing well towards his goals.   Pt prognosis is Good.     Pt will continue to benefit from skilled outpatient physical therapy to address the deficits listed in the problem list box on initial evaluation, provide pt/family education and to maximize pt's level of independence in the home and community environment.     Pt's spiritual, cultural and educational needs considered and pt agreeable to plan of care and goals.    Anticipated barriers to physical therapy: none    Goals:  Short Term Goals (STG) # weeks Goal Review Date Reviewed Date Met   1. The patient will begin a written HEP 1 met 7/7/2020 7/7/2020   2. Increase ankle dorsiflexion to 5 degrees 3 progressing 7/21/2020       3. Decrease soft tissue tenderness to mild 3 progressing 7/21/2020          Long Term Goals (LTG) # weeks Goal Review Date Reviewed Date Met   1. The patient will be independent with his HEP for maintenance 6 progressing 7/21/2020       2. Increase ankle dorsiflexion to 8 degrees 6 progressing 7/21/2020       3. Decrease FOTO score to 39 % 6 progressing 7/21/2020       4. Patient will be able to ascend/descend 20 stairs/steps without c/o pain 6 progressing 7/21/2020             Plan     Cont per POC, stretches, ROM    I certify that I was present in the room directing the student in service delivery and guiding them using my skilled judgment. As the co-signing therapist I have reviewed the students documentation and am responsible for the treatment, assessment, and plan.     Marilu Mendenhall, SPTA  Delfin Llanes, PT

## 2020-07-23 ENCOUNTER — CLINICAL SUPPORT (OUTPATIENT)
Dept: REHABILITATION | Facility: HOSPITAL | Age: 60
End: 2020-07-23
Payer: MEDICARE

## 2020-07-23 DIAGNOSIS — R26.9 GAIT ABNORMALITY: ICD-10-CM

## 2020-07-23 DIAGNOSIS — M67.88 ACHILLES TENDINOSIS: ICD-10-CM

## 2020-07-23 PROCEDURE — 97110 THERAPEUTIC EXERCISES: CPT | Mod: PN,CQ

## 2020-07-23 PROCEDURE — 97140 MANUAL THERAPY 1/> REGIONS: CPT | Mod: PN,CQ

## 2020-07-23 NOTE — PROGRESS NOTES
"  Physical Therapy Daily Treatment Note     Name: Elliott Ayala  Clinic Number: [id number]    Therapy Diagnosis:   Encounter Diagnoses   Name Primary?    Gait abnormality     Achilles tendinosis      Physician: Sharan Rice MD    Visit Date: 7/23/2020  Physician Orders: PT Eval and Treat   Medical Diagnosis from Referral: Achilles tendinosis  Evaluation Date: 7/1/2020  Authorization Period Expiration: 12/31/2020  Plan of Care Expiration: 8/21/2020  Visit # / Visits authorized: 7/ 20 (POC 7/12)    Time In: 1214   Time Out: 1300   Total Billable Time: 46 minutes    Precautions: Standard and Fall    Subjective     Pt reports: pain "always a constant 2".  He was compliant with home exercise program, sporadic.  Response to previous treatment: soreness  Functional change: WBAT on L LE with AC, 4 point gait    Pain: 2/10  Location: left foot      Objective     Elliott received therapeutic exercises to develop strength, ROM and flexibility for 36 minutes including:    Long sitting HSS 3 x 30 sec NP  Long sitting GSS 3 x 30 sec  Ankle pumps 3 x 10  Inversion/eversion 3 x 10  Alphabet x 1  Seated heel slides 2 x 10 (VC align foot with knee) NP  Seated HR x 30 NP  Seated TR x 30 NP  Towel scrunch x 30  Ankle 3 way (dorsiflexion, inversion, eversion) with GTB 3 x 10  Wobble board: cw, ccw, sup, pro, df, pf x 30 each,    Elliott received the following manual therapy techniques: STM to the lower left gastrocnemius and achilles regions for 10 minutes    Home Exercises Provided and Patient Education Provided     Education provided:   - cont HEP, desensitization at heel region    Written Home Exercises Provided: Patient instructed to cont prior HEP.  Exercises were reviewed and Elliott was able to demonstrate them prior to the end of the session.  Elliott demonstrated good  understanding of the education provided.     See EMR under Patient Instructions for exercises provided prior visit.    Assessment     Pt ambulated with 4-point gait AC " and boot upon arrival. Pt increased tolerance for STM on achilles and posterior calcaneous regions. Improved strength and ROM overall but remains limited. Decreased L eversion and supination with wobble board and ankle 4 way exercise.     Elliott is progressing well towards his goals.   Pt prognosis is Good.     Pt will continue to benefit from skilled outpatient physical therapy to address the deficits listed in the problem list box on initial evaluation, provide pt/family education and to maximize pt's level of independence in the home and community environment.     Pt's spiritual, cultural and educational needs considered and pt agreeable to plan of care and goals.    Anticipated barriers to physical therapy: none    Goals:  Short Term Goals (STG) # weeks Goal Review Date Reviewed Date Met   1. The patient will begin a written HEP 1 met 7/7/2020 7/7/2020   2. Increase ankle dorsiflexion to 5 degrees 3 progressing 7/23/2020       3. Decrease soft tissue tenderness to mild 3 progressing 7/23/2020          Long Term Goals (LTG) # weeks Goal Review Date Reviewed Date Met   1. The patient will be independent with his HEP for maintenance 6 progressing 7/23/2020       2. Increase ankle dorsiflexion to 8 degrees 6 progressing 7/23/2020       3. Decrease FOTO score to 39 % 6 progressing 7/23/2020       4. Patient will be able to ascend/descend 20 stairs/steps without c/o pain 6 progressing 7/23/2020             Plan     Cont per POC, stretches, ROM    I certify that I was present in the room directing the student in service delivery and guiding them using my skilled judgment. As the co-signing therapist I have reviewed the students documentation and am responsible for the treatment, assessment, and plan.     Marilu Mendenhall, LOUIS Nielsen, PTA

## 2020-07-28 ENCOUNTER — CLINICAL SUPPORT (OUTPATIENT)
Dept: REHABILITATION | Facility: HOSPITAL | Age: 60
End: 2020-07-28
Payer: MEDICARE

## 2020-07-28 DIAGNOSIS — M67.88 ACHILLES TENDINOSIS: ICD-10-CM

## 2020-07-28 DIAGNOSIS — R26.9 GAIT ABNORMALITY: ICD-10-CM

## 2020-07-28 PROCEDURE — 97140 MANUAL THERAPY 1/> REGIONS: CPT | Mod: PN,CQ

## 2020-07-28 PROCEDURE — 97110 THERAPEUTIC EXERCISES: CPT | Mod: PN,CQ

## 2020-07-28 NOTE — PROGRESS NOTES
"  Physical Therapy Daily Treatment Note     Name: Elliott Ayala  Clinic Number: [id number]    Therapy Diagnosis:   Encounter Diagnoses   Name Primary?    Gait abnormality     Achilles tendinosis      Physician: Sharan Rice MD    Visit Date: 7/28/2020  Physician Orders: PT Eval and Treat   Medical Diagnosis from Referral: Achilles tendinosis  Evaluation Date: 7/1/2020  Authorization Period Expiration: 12/31/2020  Plan of Care Expiration: 8/21/2020  Visit # / Visits authorized: 8/ 20 (POC 8/12)    Time In: 1250p  Time Out: 132p  Total Billable Time: 42 minutes    Precautions: Standard and Fall    Subjective     Pt reports: pain "always a constant ", I think I broke my toe on this foot, (R), trying not slip, pt reports floor was wet due to rain, and his crutches slipped on the wet floor   He was compliant with home exercise program, sporadic.  Response to previous treatment: soreness  Functional change: no change    Pain: 2/10  Location: left foot      Objective     Elliott received therapeutic exercises to develop strength, ROM and flexibility for 32 minutes including:    Long sitting HSS 3 x 30 sec   Long sitting GSS 3 x 30 sec  Ankle pumps 3 x 10  Inversion/eversion 3 x 10  CW, CCW x 30 each  Alphabet x 1  Seated heel slides 3 x 10  Seated HR x 30   Seated TR x 30   Towel scrunch x 30  Ankle 3 way (dorsiflexion, inversion, eversion) with GTB 3 x 10  Wobble board: cw, ccw, sup, pro, df, pf x 30 each, df/pf only, pain with sup/pro  +Independence  x 2 minutes    Elliott received the following manual therapy techniques: STM to the lower left gastrocnemius and achilles regions for 10 minutes    Home Exercises Provided and Patient Education Provided     Education provided:   - cont HEP, gastroc stretch daily    Written Home Exercises Provided: Patient instructed to cont prior HEP.  Exercises were reviewed and Elliott was able to demonstrate them prior to the end of the session.  Elliott demonstrated good  understanding of the " education provided.     See EMR under Patient Instructions for exercises provided prior visit.    Assessment     Tenderness remains gastroc region.  Increased fatigue L ankle with GTB eversion.    Elliott is progressing well towards his goals.   Pt prognosis is Good.     Pt will continue to benefit from skilled outpatient physical therapy to address the deficits listed in the problem list box on initial evaluation, provide pt/family education and to maximize pt's level of independence in the home and community environment.     Pt's spiritual, cultural and educational needs considered and pt agreeable to plan of care and goals.    Anticipated barriers to physical therapy: none    Goals:  Short Term Goals (STG) # weeks Goal Review Date Reviewed Date Met   1. The patient will begin a written HEP 1 met 7/7/2020 7/7/2020   2. Increase ankle dorsiflexion to 5 degrees 3 progressing 7/28/2020       3. Decrease soft tissue tenderness to mild 3 progressing 7/28/2020          Long Term Goals (LTG) # weeks Goal Review Date Reviewed Date Met   1. The patient will be independent with his HEP for maintenance 6 progressing 7/28/2020       2. Increase ankle dorsiflexion to 8 degrees 6 progressing 7/28/2020       3. Decrease FOTO score to 39 % 6 progressing 7/28/2020       4. Patient will be able to ascend/descend 20 stairs/steps without c/o pain 6 progressing 7/28/2020             Plan     Cont per POC, stretches, ROM      Mei Nielsen, PTA

## 2020-07-30 ENCOUNTER — CLINICAL SUPPORT (OUTPATIENT)
Dept: REHABILITATION | Facility: HOSPITAL | Age: 60
End: 2020-07-30
Payer: MEDICARE

## 2020-07-30 DIAGNOSIS — M67.88 ACHILLES TENDINOSIS: ICD-10-CM

## 2020-07-30 DIAGNOSIS — R26.9 GAIT ABNORMALITY: ICD-10-CM

## 2020-07-30 PROCEDURE — 97140 MANUAL THERAPY 1/> REGIONS: CPT | Mod: PN,CQ

## 2020-07-30 PROCEDURE — 97110 THERAPEUTIC EXERCISES: CPT | Mod: PN,CQ

## 2020-07-30 NOTE — PROGRESS NOTES
Physical Therapy Daily Treatment Note     Name: Elliott Ayala  Clinic Number: [id number]    Therapy Diagnosis:   Encounter Diagnoses   Name Primary?    Gait abnormality     Achilles tendinosis      Physician: Sharan Rice MD    Visit Date: 7/30/2020  Physician Orders: PT Eval and Treat   Medical Diagnosis from Referral: Achilles tendinosis  Evaluation Date: 7/1/2020  Authorization Period Expiration: 12/31/2020  Plan of Care Expiration: 8/21/2020  Visit # / Visits authorized: 9/ 20 (POC 9/12)    Time In: 1212p  Time Out: 101p  Total Billable Time: 49 minutes    Precautions: Standard and Fall    Subjective     Pt reports: he took a couple of steps, felt weakness in the foot   He was compliant with home exercise program, sporadic.  Response to previous treatment: soreness  Functional change: no change    Pain: 2/10  Location: left foot      Objective     Elliott received therapeutic exercises to develop strength, ROM and flexibility for 39 minutes including:    Long sitting HSS 3 x 30 sec   Long sitting GSS 3 x 30 sec  Ankle pumps 3 x 10  Inversion/eversion 3 x 10  CW, CCW x 30 each  Alphabet x 1  Seated heel slides 3 x 10  Seated HR x 30   Seated TR x 30   Towel scrunch x 30  Ankle 4 way with GTB 3 x 10  Wobble board: cw, ccw, sup, pro, df, pf x 30 each, df/pf only, pain with sup/pro  +[place]  x 2 minutes    Elliott received the following manual therapy techniques: STM to the lower left gastrocnemius and achilles regions for 10 minutes    Home Exercises Provided and Patient Education Provided     Education provided:   - cont HEP, bring shoe next visit     Written Home Exercises Provided: Patient instructed to cont prior HEP.  Exercises were reviewed and Elliott was able to demonstrate them prior to the end of the session.  Elliott demonstrated good  understanding of the education provided.     See EMR under Patient Instructions for exercises provided prior visit.    Assessment     Decreased TP's along gastroc region.   Pt will bring shoe next visit to exercise out of the boot.  No increase in s/s reported.    Elliott is progressing well towards his goals.   Pt prognosis is Good.     Pt will continue to benefit from skilled outpatient physical therapy to address the deficits listed in the problem list box on initial evaluation, provide pt/family education and to maximize pt's level of independence in the home and community environment.     Pt's spiritual, cultural and educational needs considered and pt agreeable to plan of care and goals.    Anticipated barriers to physical therapy: none    Goals:  Short Term Goals (STG) # weeks Goal Review Date Reviewed Date Met   1. The patient will begin a written HEP 1 met 7/7/2020 7/7/2020   2. Increase ankle dorsiflexion to 5 degrees 3 progressing 7/30/2020       3. Decrease soft tissue tenderness to mild 3 progressing 7/30/2020          Long Term Goals (LTG) # weeks Goal Review Date Reviewed Date Met   1. The patient will be independent with his HEP for maintenance 6 progressing 7/30/2020       2. Increase ankle dorsiflexion to 8 degrees 6 progressing 7/30/2020       3. Decrease FOTO score to 39 % 6 progressing 7/30/2020       4. Patient will be able to ascend/descend 20 stairs/steps without c/o pain 6 progressing 7/30/2020             Plan     Cont per POC, stretches, ROM      Mei Nielsen, PTA

## 2020-08-04 ENCOUNTER — CLINICAL SUPPORT (OUTPATIENT)
Dept: REHABILITATION | Facility: HOSPITAL | Age: 60
End: 2020-08-04
Payer: MEDICARE

## 2020-08-04 DIAGNOSIS — M67.88 ACHILLES TENDINOSIS: ICD-10-CM

## 2020-08-04 DIAGNOSIS — R26.9 GAIT ABNORMALITY: ICD-10-CM

## 2020-08-04 PROCEDURE — 97110 THERAPEUTIC EXERCISES: CPT | Mod: PN | Performed by: PHYSICAL THERAPIST

## 2020-08-04 NOTE — PLAN OF CARE
Outpatient Therapy Updated Plan of Care     Visit Date: 8/4/2020    Name: Elliott Ayala  Clinic Number: [id number]    Therapy Diagnosis:   Encounter Diagnoses   Name Primary?    Gait abnormality     Achilles tendinosis      Physician: Sharan Rice MD    Physician Orders: PT Eval and Treat   Medical Diagnosis from Referral: Achilles tendinosis  Evaluation Date: 8/4/2020  Current Certification Period:  7/1/2020 to 8/21/2020  Authorization Period Expiration: 12/31/2020  Plan of Care Expiration: 8/21/2020  Visit # / Visits authorized: 10/ 20        Precautions: Standard  Functional Level Prior to Evaluation:  Decreased ability to perform ADL and limited tolerance to standing and walking.    Subjective     Update: The patient reports he has been able to walk short distances without crutches.     Objective     Update:     Ankle AROM Previous  Current     Left Right Left Right   Dorsiflexion 0 degrees 4 degrees 4 degrees 4 degrees   Plantarflexion 40 degrees 40 degrees 40 degrees 40 degrees   Inversion 18 degrees 30 degrees 26 degrees 30 degrees   Eversion 4 degrees 12 degrees 10 degrees 12 degrees       Ankle MMT Previous  Current     Left Right Left Right   Dorsiflexion 5/5 5/5 5/5 5/5   Plantarflexion 5/5 5/5 5/5 5/5   Inversion 4+/5 5/5 5/5 5/5   Eversion 4+/5 5/5 4+/5 5/5       Assessment     Update: The patient is progressing with increased ROM and WB status    Previous Short Term Goals Status:     Short Term Goals (STG) # weeks Goal Review Date Reviewed Date Met   1. The patient will begin a written HEP 1 met 7/7/2020 7/7/2020   2. Increase ankle dorsiflexion to 5 degrees 3 progressing 8/4/2020        3. Decrease soft tissue tenderness to mild 3 progressing 8/4/2020            Long Term Goal Status:   continue per initial plan of care.  Long Term Goals (LTG) # weeks Goal Review Date Reviewed Date Met   1. The patient will be independent with his HEP for maintenance 6 progressing 8/4/2020        2. Increase ankle  dorsiflexion to 8 degrees 6 progressing 8/4/2020        3. Decrease FOTO score to 39 % 6 progressing 8/4/2020        4. Patient will be able to ascend/descend 20 stairs/steps without c/o pain 6 progressing 8/4/2020            Reasons for Recertification of Therapy:   Progress ROM and weight bearing status    Plan     Updated Certification Period: 8/4/2020 to 9/25/2020  Recommended Treatment Plan: 2 times per week for 6 weeks: Manual Therapy, Neuromuscular Re-ed, Patient Education, Therapeutic Activites and Therapeutic Exercise  Other Recommendations: Progress as tolerated    Delfin Llanes, PT  8/4/2020      I CERTIFY THE NEED FOR THESE SERVICES FURNISHED UNDER THIS PLAN OF TREATMENT AND WHILE UNDER MY CARE    Physician's comments:        Physician's Signature: ___________________________________________________

## 2020-08-04 NOTE — PROGRESS NOTES
Physical Therapy Daily Treatment Note     Name: Elliott Ayala  Clinic Number: [id number]    Therapy Diagnosis:   Encounter Diagnoses   Name Primary?    Gait abnormality     Achilles tendinosis      Physician: Sharan Rice MD    Visit Date: 8/4/2020  Physician Orders: PT Eval and Treat   Medical Diagnosis from Referral: Achilles tendinosis  Evaluation Date: 7/1/2020  Authorization Period Expiration: 12/31/2020  Plan of Care Expiration: 8/21/2020  Visit # / Visits authorized: 10/ 20 (POC 10/12)    Time In: 1330  Time Out: 1415  Total Billable Time: 45 minutes    Precautions: Standard and Fall    Subjective     Pt reports: he took a couple of steps, felt weakness in the foot   He was compliant with home exercise program, sporadic.  Response to previous treatment: soreness  Functional change: no change    Pain: 2/10  Location: left foot      Objective     ROM: Left Right   Ankle Dorsiflexion 4 degrees 4 degrees   Ankle Plantarflexion 40 degrees 40 degrees   Ankle Inversion 32 degrees 30 degrees   Ankle Eversion 12 degrees 12 degrees          Elliott received therapeutic exercises to develop strength, ROM and flexibility for 45 minutes including:    Long sitting HSS 3 x 30 sec   Long sitting GSS 3 x 30 sec  Ankle pumps 3 x 10  Inversion/eversion 3 x 10  CW, CCW x 30 each  Alphabet x 1  Seated heel slides 3 x 10  Seated HR x 30   Seated TR x 30   Towel scrunch x 30  Ankle DF/IN/EV with BTB 3 x 10  Wobble board A/P, M/L, cw/ccw x 30  [place]  x 2 minutes  Stationary bike 2 min level 1 seat 11        Elliott received the following manual therapy techniques: STM to the lower left gastrocnemius and achilles regions for 10 minutes held    Home Exercises Provided and Patient Education Provided     Education provided:   - cont HEP, bring shoe next visit     Written Home Exercises Provided: Patient instructed to cont prior HEP.  Exercises were reviewed and Elliott was able to demonstrate them prior to the end of the session.   Elliott demonstrated good  understanding of the education provided.     See EMR under Patient Instructions for exercises provided prior visit.    Assessment     Decreased TP's along gastroc region.  Pt will bring shoe next visit to exercise out of the boot.  No increase in s/s reported.    Elliott is progressing well towards his goals.   Pt prognosis is Good.     Pt will continue to benefit from skilled outpatient physical therapy to address the deficits listed in the problem list box on initial evaluation, provide pt/family education and to maximize pt's level of independence in the home and community environment.     Pt's spiritual, cultural and educational needs considered and pt agreeable to plan of care and goals.    Anticipated barriers to physical therapy: none    Goals:  Short Term Goals (STG) # weeks Goal Review Date Reviewed Date Met   1. The patient will begin a written HEP 1 met 7/7/2020 7/7/2020   2. Increase ankle dorsiflexion to 5 degrees 3 progressing 8/4/2020       3. Decrease soft tissue tenderness to mild 3 progressing 8/4/2020          Long Term Goals (LTG) # weeks Goal Review Date Reviewed Date Met   1. The patient will be independent with his HEP for maintenance 6 progressing 8/4/2020       2. Increase ankle dorsiflexion to 8 degrees 6 progressing 8/4/2020       3. Decrease FOTO score to 39 % 6 progressing 8/4/2020       4. Patient will be able to ascend/descend 20 stairs/steps without c/o pain 6 progressing 8/4/2020             Plan     Continue with physical therapy as per plan of care      Delfin Llanes, PT

## 2020-08-05 ENCOUNTER — OFFICE VISIT (OUTPATIENT)
Dept: SPORTS MEDICINE | Facility: CLINIC | Age: 60
End: 2020-08-05
Payer: MEDICARE

## 2020-08-05 VITALS
HEART RATE: 109 BPM | DIASTOLIC BLOOD PRESSURE: 72 MMHG | OXYGEN SATURATION: 95 % | TEMPERATURE: 97 F | SYSTOLIC BLOOD PRESSURE: 126 MMHG | HEIGHT: 69 IN | BODY MASS INDEX: 46.65 KG/M2 | WEIGHT: 315 LBS

## 2020-08-05 DIAGNOSIS — M67.88 ACHILLES TENDINOSIS: ICD-10-CM

## 2020-08-05 DIAGNOSIS — M76.62 ACHILLES TENDINITIS, LEFT LEG: Primary | ICD-10-CM

## 2020-08-05 PROCEDURE — 99999 PR PBB SHADOW E&M-EST. PATIENT-LVL IV: ICD-10-PCS | Mod: PBBFAC,,, | Performed by: FAMILY MEDICINE

## 2020-08-05 PROCEDURE — 99214 PR OFFICE/OUTPT VISIT, EST, LEVL IV, 30-39 MIN: ICD-10-PCS | Mod: S$PBB,,, | Performed by: FAMILY MEDICINE

## 2020-08-05 PROCEDURE — 99999 PR PBB SHADOW E&M-EST. PATIENT-LVL IV: CPT | Mod: PBBFAC,,, | Performed by: FAMILY MEDICINE

## 2020-08-05 PROCEDURE — 99214 OFFICE O/P EST MOD 30 MIN: CPT | Mod: S$PBB,,, | Performed by: FAMILY MEDICINE

## 2020-08-05 PROCEDURE — 99214 OFFICE O/P EST MOD 30 MIN: CPT | Mod: PBBFAC,PO | Performed by: FAMILY MEDICINE

## 2020-08-05 NOTE — PROGRESS NOTES
Subjective:          Chief Complaint: Elliott Ayala is a 60 y.o. male who had concerns including Follow-up.    Patient here today for follow-up of right-sided Achilles tendinitis.  He has been seen physical therapy for the last six weeks.  PT notes report he has improved strength and function.  However see states he still lacks confidence bearing weight on that foot because of pain.  He says he is ambulatory without crutches however he still uses them because he has insecurity.  He is still walking in the boot and states that he has tried to tested at home and is only able to take a few steps without pain.  PT notes indicate that he is able to bear weight but seems to lack confidence and putting weight on that ankle.  Functionally his ankle is normal according to the PT notes.  They are recommending continuation of therapy.          Review of Systems   Constitution: Negative for chills and decreased appetite.   HENT: Negative for congestion and sore throat.    Eyes: Negative for blurred vision.   Cardiovascular: Negative for chest pain, dyspnea on exertion and palpitations.   Respiratory: Negative for cough and shortness of breath.    Skin: Negative for rash.   Neurological: Negative for difficulty with concentration, disturbances in coordination and headaches.   Psychiatric/Behavioral: Negative for altered mental status, depression, hallucinations, memory loss and suicidal ideas.                   Objective:        General: Elliott is well-developed, well-nourished, appears stated age, in no acute distress, alert and oriented to time, place and person.     General    Nursing note and vitals reviewed.  Constitutional: He is oriented to person, place, and time. He appears well-developed and well-nourished.   HENT:   Nose: Nose normal.   Eyes: EOM are normal. Pupils are equal, round, and reactive to light.   Neck: Normal range of motion. Neck supple.   Cardiovascular: Normal rate.    Pulmonary/Chest: Effort normal.    Abdominal: Soft.   Neurological: He is alert and oriented to person, place, and time. He has normal reflexes.   Psychiatric: He has a normal mood and affect. His behavior is normal. Judgment and thought content normal.         Right Ankle/Foot Exam   Right ankle exam is normal.    Inspection   Scars: present    Left Ankle/Foot Exam     Pain   The patient exhibits pain of the Achilles tendon.    Swelling   The patient is swollen on the Achilles tendon.    Tenderness   The patient is tender to palpation of the Achilles tendon.    Range of Motion   Ankle Joint  Dorsiflexion: normal   Plantar flexion: normal     Subtalar Joint   Inversion: normal   Eversion: normal   Black Test:  normal    Tests   Anterior drawer: negative  Varus tilt: negative    Other   Foot Crepitus:  absent  Ankle Crepitus: absent  Sensation: normal      Vascular Exam       Left Pulses  Dorsalis Pedis:      2+                      Assessment:       Encounter Diagnoses   Name Primary?    Achilles tendinitis, left leg Yes    Achilles tendinosis     BMI 45.0-49.9, adult           Plan:         Orders Placed This Encounter   Procedures    Ambulatory referral/consult to Physical/Occupational Therapy     Standing Status:   Future     Standing Expiration Date:   9/5/2021     Referral Priority:   Routine     Referral Type:   Physical Medicine     Referral Reason:   Specialty Services Required     Number of Visits Requested:   1       Discussed treatment options with patient including rest, heat, ice, anti-inflammatories both over-the-counter prescription, bracing, physical therapy both formal and at home, injection therapy with corticosteroids and biological agents, and surgical options.  Patient elected for continuation of physical therapy.  Will add in considerations for dry needling, E stem, and ultrasound in PT order.  Also discussed again the option of using PRP with patient.  Will follow-up in one month for reassessment if not improved PRP  may be the best option.                    Patient questionnaires may have been collected.

## 2020-08-05 NOTE — PATIENT INSTRUCTIONS
Calf Raise (Strength)    1. Stand up straight with both feet flat on the floor, slightly apart. Hold onto a sturdy chair, railing, counter, or table.  2. Raise both heels so youre standing on the balls of your feet. Dont lock your knees or arch your back. Hold for 5 seconds. Then slowly lower your heels back down to the floor.  3. Repeat 10 times, or as instructed.  4. Do this exercise 3 times a day, or as instructed.     Challenge yourself  As you become stronger, do this exercise on one foot at a time.   Date Last Reviewed: 3/10/2016  © 2962-7001 [employer]. [address] [place] [address], [place] [address]. All rights reserved. This information is not intended as a substitute for professional medical care. Always follow your healthcare professional's instructions.

## 2020-08-10 ENCOUNTER — CLINICAL SUPPORT (OUTPATIENT)
Dept: REHABILITATION | Facility: HOSPITAL | Age: 60
End: 2020-08-10
Payer: MEDICARE

## 2020-08-10 DIAGNOSIS — M67.88 ACHILLES TENDINOSIS: ICD-10-CM

## 2020-08-10 DIAGNOSIS — R26.9 GAIT ABNORMALITY: ICD-10-CM

## 2020-08-10 PROCEDURE — 97530 THERAPEUTIC ACTIVITIES: CPT | Mod: PN,CQ

## 2020-08-10 PROCEDURE — 97110 THERAPEUTIC EXERCISES: CPT | Mod: PN,CQ

## 2020-08-10 NOTE — PROGRESS NOTES
Physical Therapy Daily Treatment Note     Name: Elliott Ayala  Clinic Number: [id number]    Therapy Diagnosis:   Encounter Diagnoses   Name Primary?    Gait abnormality     Achilles tendinosis      Physician: Sharan Rice MD    Visit Date: 8/10/2020  Physician Orders: PT Eval and Treat   Medical Diagnosis from Referral: Achilles tendinosis  Evaluation Date: 7/1/2020  Authorization Period Expiration: 12/31/2020  Plan of Care Expiration: 9/25/2020  Visit # / Visits authorized: 11/ 20 (UPDATED POC 1/12, 11th visit total) [POC: 8/4-9/25, 2 x 6]    Time In: 527  Time Out: 615  Total Billable Time: 48 minutes    Precautions: Standard and Fall    Subjective     Pt reports: he took a couple of steps, felt weakness in the foot   He was compliant with home exercise program, sporadic.  Response to previous treatment: soreness  Functional change: no change    Pain: 2/10  Location: left foot      Objective       Elliott received therapeutic exercises to develop strength, ROM and flexibility for 38 minutes including:    Long sitting HSS 3 x 30 sec NP  Long sitting GSS 3 x 30 sec, slant board LLE  Ankle pumps 3 x 10 NP  Inversion/eversion 3 x 10 NP  CW, CCW x 30 each  Alphabet x 1  Seated heel slides 3 x 10   Seated HR x 30 NP  Seated TR x 30 NP  Towel scrunch x 30 NP  Ankle DF/IN/EV with BTB 3 x 10  Wobble board A/P, M/L (NP), cw/ccw x 30 each  [place]  x 2 minutes NP  Upright bike 5 min level 3, seat 7    TA x 10 minutes:  //bars:  +SLS w/R toes on floor 2 x 10 sec  +HR 2 x 10    NOT PERFORMED:  Elliott received the following manual therapy techniques: STM to the lower left gastrocnemius and achilles regions for 10 minutes held    Home Exercises Provided and Patient Education Provided     Education provided:   - cont HEP, verbally instructed pt in hip abd w/BTB and hip add, pt verbalized understanding, BTB issued    Written Home Exercises Provided: Patient instructed to cont prior HEP.  Exercises were reviewed and Elliott was  able to demonstrate them prior to the end of the session.  Elliott demonstrated good  understanding of the education provided.     See EMR under Patient Instructions for exercises provided prior visit.    Assessment     Pt SOB with standing exercises.  Limited ankle ROM with HR.  Therapeutic activities in //bars to improve functional activities: improve gait pattern-toe off, stance phase, proprioception.    Elliott is progressing well towards his goals.   Pt prognosis is Good.     Pt will continue to benefit from skilled outpatient physical therapy to address the deficits listed in the problem list box on initial evaluation, provide pt/family education and to maximize pt's level of independence in the home and community environment.     Pt's spiritual, cultural and educational needs considered and pt agreeable to plan of care and goals.    Anticipated barriers to physical therapy: none    Previous Short Term Goals Status:             Short Term Goals (STG) # weeks Goal Review Date Reviewed Date Met   1. The patient will begin a written HEP 1 met 7/7/2020 7/7/2020   2. Increase ankle dorsiflexion to 5 degrees 3 progressing 8/10/2020          3. Decrease soft tissue tenderness to mild 3 progressing 8/10/2020                Long Term Goal Status:   continue per initial plan of care.  Long Term Goals (LTG) # weeks Goal Review Date Reviewed Date Met   1. The patient will be independent with his HEP for maintenance 6 progressing 8/10/2020       2. Increase ankle dorsiflexion to 8 degrees 6 progressing 8/10/2020          3. Decrease FOTO score to 39 % 6 progressing 8/10/2020          4. Patient will be able to ascend/descend 20 stairs/steps without c/o pain 6 progressing 8/10/2020                    Plan     Continue with physical therapy as per plan of care      Mei Nielsen, PTA

## 2020-08-11 ENCOUNTER — DOCUMENTATION ONLY (OUTPATIENT)
Dept: REHABILITATION | Facility: HOSPITAL | Age: 60
End: 2020-08-11

## 2020-08-11 NOTE — PROGRESS NOTES
30 day visit PT-PTA face-face discussion with TIRSO Llanes re: patient status, POC, and plan for progression done 8/10/20.  Mei Nielsen, PTA

## 2020-08-19 ENCOUNTER — CLINICAL SUPPORT (OUTPATIENT)
Dept: REHABILITATION | Facility: HOSPITAL | Age: 60
End: 2020-08-19
Payer: MEDICARE

## 2020-08-19 DIAGNOSIS — M67.88 ACHILLES TENDINOSIS: ICD-10-CM

## 2020-08-19 DIAGNOSIS — R26.9 GAIT ABNORMALITY: ICD-10-CM

## 2020-08-19 PROCEDURE — 97110 THERAPEUTIC EXERCISES: CPT | Mod: PN,CQ

## 2020-08-19 PROCEDURE — 97530 THERAPEUTIC ACTIVITIES: CPT | Mod: PN,CQ

## 2020-08-21 ENCOUNTER — CLINICAL SUPPORT (OUTPATIENT)
Dept: REHABILITATION | Facility: HOSPITAL | Age: 60
End: 2020-08-21
Payer: MEDICARE

## 2020-08-21 DIAGNOSIS — M67.88 ACHILLES TENDINOSIS: ICD-10-CM

## 2020-08-21 DIAGNOSIS — R26.9 GAIT ABNORMALITY: ICD-10-CM

## 2020-08-21 PROCEDURE — 97110 THERAPEUTIC EXERCISES: CPT | Mod: PN | Performed by: PHYSICAL THERAPIST

## 2020-08-21 NOTE — PROGRESS NOTES
Physical Therapy Daily Treatment Note     Name: Elliott Ayala  Clinic Number: [id number]    Therapy Diagnosis:   Encounter Diagnoses   Name Primary?    Gait abnormality     Achilles tendinosis      Physician: Sharan Rice MD    Visit Date: 8/21/2020  Physician Orders: PT Eval and Treat   Medical Diagnosis from Referral: Achilles tendinosis  Evaluation Date: 7/1/2020  Authorization Period Expiration: 12/31/2020  Plan of Care Expiration: 9/25/2020  Visit # / Visits authorized: 12/ 20 (UPDATED POC 3/12, 11th visit total) [POC: 8/4-9/25, 2 x 6]  Time In: 1530  Time Out: 1615  Total Billable Time: 48 minutes    Precautions: Standard and Fall    Subjective     Pt reports: swelling of the ankle  He was compliant with home exercise program, sporadic.  Response to previous treatment: soreness  Functional change: no change    Pain: 3/10  Location: left foot      Objective       Elliott received therapeutic exercises to develop strength, ROM and flexibility for 45 minutes including:       Long sitting HSS 3 x 30 sec NP  Long sitting GSS 3 x 30 sec  Ankle pumps 3 x 10   Inversion/eversion 3 x 10   CW, CCW x 30 each  Alphabet x 1  Seated heel slides 3 x 10   Seated HR x 30   Seated TR x 30   Towel scrunch x 30 NP  Ankle DF/IN/EV with BTB 3 x 10  Wobble board A/P, M/L , cw/ccw x 30 each  [place]  x 2 minutes NP  Upright bike 5 min level 3, seat 7    TA x 10 minutes to improve balance, proprioception, weight bearing:  //bars:  SLS w/R toes on floor 3 x 10 sec  HR 2 x 10  +Weight shift R/L on airex x 1 minute    Elliott received the following manual therapy techniques: STM to the lower left gastrocnemius and achilles regions for 5 minutes.    NOT PERFORMED:   Towel scrunch x 30 NP  [place]  x 2 minutes NP  Inversion/eversion 3 x 10 NP  CW, CCW x 30 each NP  Seated HR x 30 NP  Seated TR x 30 NP  Ankle pumps 3 x 10 NP    Home Exercises Provided and Patient Education Provided     Education provided:   - cont HEP, instructed  to wear shoe outside short distances around house, pt reports he does this already    Written Home Exercises Provided: Patient instructed to cont prior HEP.  Exercises were reviewed and Elliott was able to demonstrate them prior to the end of the session.  Elliott demonstrated good  understanding of the education provided.     See EMR under Patient Instructions for exercises provided prior visit.    Assessment     Decreased wb and toe off LLE, instructed on correct gait pattern, pt able to correct after VC.    Pt apprehensive to wear shoe out in community.  Decreased TP along achilles and medial ankle region.    Elliott is progressing well towards his goals.   Pt prognosis is Good.     Pt will continue to benefit from skilled outpatient physical therapy to address the deficits listed in the problem list box on initial evaluation, provide pt/family education and to maximize pt's level of independence in the home and community environment.     Pt's spiritual, cultural and educational needs considered and pt agreeable to plan of care and goals.    Anticipated barriers to physical therapy: none    Previous Short Term Goals Status:             Short Term Goals (STG) # weeks Goal Review Date Reviewed Date Met   1. The patient will begin a written HEP 1 met 7/7/2020 7/7/2020   2. Increase ankle dorsiflexion to 5 degrees 3 progressing 8/21/2020          3. Decrease soft tissue tenderness to mild 3 progressing 8/21/2020                Long Term Goal Status:   continue per initial plan of care.  Long Term Goals (LTG) # weeks Goal Review Date Reviewed Date Met   1. The patient will be independent with his HEP for maintenance 6 progressing 8/21/2020       2. Increase ankle dorsiflexion to 8 degrees 6 progressing 8/21/2020          3. Decrease FOTO score to 39 % 6 progressing 8/21/2020          4. Patient will be able to ascend/descend 20 stairs/steps without c/o pain 6 progressing 8/21/2020                    Plan     Continue with  physical therapy as per plan of care      Delfin Llanes, PT

## 2020-08-31 ENCOUNTER — CLINICAL SUPPORT (OUTPATIENT)
Dept: REHABILITATION | Facility: HOSPITAL | Age: 60
End: 2020-08-31
Payer: MEDICARE

## 2020-08-31 DIAGNOSIS — R26.9 GAIT ABNORMALITY: ICD-10-CM

## 2020-08-31 DIAGNOSIS — M67.88 ACHILLES TENDINOSIS: ICD-10-CM

## 2020-08-31 PROCEDURE — 97110 THERAPEUTIC EXERCISES: CPT | Mod: PN,CQ

## 2020-08-31 PROCEDURE — 97530 THERAPEUTIC ACTIVITIES: CPT | Mod: PN,CQ

## 2020-08-31 NOTE — PROGRESS NOTES
Physical Therapy Daily Treatment Note     Name: Elliott Ayala  Clinic Number: [id number]    Therapy Diagnosis:   Encounter Diagnoses   Name Primary?    Gait abnormality     Achilles tendinosis      Physician: Sharan Rice MD    Visit Date: 8/31/2020  Physician Orders: PT Eval and Treat   Medical Diagnosis from Referral: Achilles tendinosis  Evaluation Date: 7/1/2020  Authorization Period Expiration: 12/31/2020  Plan of Care Expiration: 9/25/2020  Visit # / Visits authorized: 13/ 20 (UPDATED POC 3/12, 11th visit total) [POC: 8/4-9/25, 2 x 6]  Time In: 143p  Time Out: 230p  Total Billable Time: 42 minutes    Precautions: Standard and Fall    Subjective     Pt reports: reports L knee weakness, pt reported vertigo last week  He was compliant with home exercise program, sporadic.  Response to previous treatment: soreness  Functional change:  Not wearing boot anymore    Pain: 3/10  Location: left foot      Objective       Elliott received therapeutic exercises to develop strength, ROM and flexibility for 32 minutes including:    Long sitting HSS 3 x 30 sec NP  Long sitting GSS 3 x 30 sec NP  Ankle pumps 3 x 10 NP  Inversion/eversion 3 x 10 NP  CW, CCW x 30 each NP  Alphabet x 1 NP  Seated heel slides 3 x 10   Towel scrunch x 30 NP  Ankle DF/IN/EV with BTB 3 x 10  Wobble board A/P, M/L , cw/ccw x 30 each  [place]  x 2 minutes NP  Upright bike 3 min, level 3, seat 7-pt needed to stop to use restroom    TA x 10 minutes to improve balance, proprioception, weight bearing:  //bars:  SLS w/R toes on floor 3 x 10 sec  HR 3 x 10  Weight shift R/L on airex x 1 minute  +Lateral stepping GTB in //bars x 3 laps  +GTB retro walk //bars x 3 laps  Slant board gasttoc stretch 3 x 30 sec L    Elliott received the following manual therapy techniques: STM to the lower left gastrocnemius and achilles regions for 5 minutes.    NOT PERFORMED:   Towel scrunch x 30 NP  [place]  x 2 minutes NP  Inversion/eversion 3 x 10 NP  CW, CCW x 30  each NP  Seated HR x 30 NP  Seated TR x 30 NP  Ankle pumps 3 x 10 NP  Seated HR x 30   Seated TR x 30     Home Exercises Provided and Patient Education Provided     Education provided:   - cont HEP, instructed to wear shoe outside short distances around house, pt reports he does this already    Written Home Exercises Provided: Patient instructed to cont prior HEP.  Exercises were reviewed and Elliott was able to demonstrate them prior to the end of the session.  Elliott demonstrated good  understanding of the education provided.     See EMR under Patient Instructions for exercises provided prior visit.    Assessment     Pt with improved gait pattern, however, pt continues to ambulate with L  decreased toe off and hip extension.  Lateral stepping: decreased push off w/LLE while stepping to R, VC for correct technique, pt able to improve technique.  Pt with SOB with activity.     Pt apprehensive to wear shoe out in community.  Decreased TP along achilles and medial ankle region.    Elliott is progressing well towards his goals.   Pt prognosis is Good.     Pt will continue to benefit from skilled outpatient physical therapy to address the deficits listed in the problem list box on initial evaluation, provide pt/family education and to maximize pt's level of independence in the home and community environment.     Pt's spiritual, cultural and educational needs considered and pt agreeable to plan of care and goals.    Anticipated barriers to physical therapy: none    Previous Short Term Goals Status:             Short Term Goals (STG) # weeks Goal Review Date Reviewed Date Met   1. The patient will begin a written HEP 1 met 7/7/2020 7/7/2020   2. Increase ankle dorsiflexion to 5 degrees 3 progressing 8/31/2020          3. Decrease soft tissue tenderness to mild 3 progressing 8/31/2020                Long Term Goal Status:   continue per initial plan of care.  Long Term Goals (LTG) # weeks Goal Review Date Reviewed Date Met    1. The patient will be independent with his HEP for maintenance 6 progressing 8/31/2020       2. Increase ankle dorsiflexion to 8 degrees 6 progressing 8/31/2020          3. Decrease FOTO score to 39 % 6 progressing 8/31/2020          4. Patient will be able to ascend/descend 20 stairs/steps without c/o pain 6 progressing 8/31/2020                    Plan     Continue with physical therapy as per plan of care      Mei Nielsen, PTA

## 2020-09-02 ENCOUNTER — CLINICAL SUPPORT (OUTPATIENT)
Dept: REHABILITATION | Facility: HOSPITAL | Age: 60
End: 2020-09-02
Payer: MEDICARE

## 2020-09-02 DIAGNOSIS — M67.88 ACHILLES TENDINOSIS: ICD-10-CM

## 2020-09-02 DIAGNOSIS — R26.9 GAIT ABNORMALITY: ICD-10-CM

## 2020-09-02 PROCEDURE — 97110 THERAPEUTIC EXERCISES: CPT | Mod: PN | Performed by: PHYSICAL THERAPIST

## 2020-09-02 PROCEDURE — 97150 GROUP THERAPEUTIC PROCEDURES: CPT | Mod: PN | Performed by: PHYSICAL THERAPIST

## 2020-09-02 NOTE — PROGRESS NOTES
Physical Therapy Daily Treatment Note     Name: Elliott Ayala  Clinic Number: [id number]    Therapy Diagnosis:   Encounter Diagnoses   Name Primary?    Gait abnormality     Achilles tendinosis      Physician: Sharan Rice MD    Visit Date: 9/2/2020  Physician Orders: PT Eval and Treat   Medical Diagnosis from Referral: Achilles tendinosis  Evaluation Date: 7/1/2020  Authorization Period Expiration: 12/31/2020  Plan of Care Expiration: 9/25/2020  Visit # / Visits authorized: 14/ 20 (UPDATED POC 4/12, 11th visit total) [POC: 8/4-9/25, 2 x 6]  Time In: 1330  Time Out: 1415  Total Billable Time: 45 minutes    Precautions: Standard and Fall    Subjective     Pt reports: pain and difficulty with descending stairs  He was compliant with home exercise program, sporadic.  Response to previous treatment: soreness  Functional change:  Not wearing boot anymore    Pain: 3/10  Location: left foot      Objective       Elliott received therapeutic exercises to develop strength, ROM and flexibility for 35 minutes including:    Long sitting GSS 3 x 30 sec   Ankle pumps 3 x 10 NP  Seated heel slides 3 x 10   Towel scrunch x 30 NP  Ankle DF/IN/EV with BTB 3 x 10  Wobble board A/P, M/L , cw/ccw x 30 each  [place]  x 2 minutes NP  Upright bike 4 min, level 3, seat 7    TA x 10 minutes to improve balance, proprioception, weight bearing:  //bars:  SLS w/R toes on floor 3 x 10 sec  HR 3 x 10  Weight shift R/L on airex x 1 minute  +Lateral stepping GTB in //bars x 3 laps  +GTB retro walk //bars x 3 laps  Slant board gasttoc stretch 3 x 30 sec L  Step downs 2 x 10 2 inch     Elliott received the following manual therapy techniques: STM to the lower left gastrocnemius and achilles regions for 0 minutes.    NOT PERFORMED:   Towel scrunch x 30 NP  [place]  x 2 minutes NP  Inversion/eversion 3 x 10 NP  CW, CCW x 30 each NP  Seated HR x 30 NP  Seated TR x 30 NP  Ankle pumps 3 x 10 NP  Seated HR x 30   Seated TR x 30     Home Exercises  Provided and Patient Education Provided     Education provided:   - cont HEP, instructed to wear shoe outside short distances around house, pt reports he does this already    Written Home Exercises Provided: Patient instructed to cont prior HEP.  Exercises were reviewed and Elliott was able to demonstrate them prior to the end of the session.  Elliott demonstrated good  understanding of the education provided.     See EMR under Patient Instructions for exercises provided prior visit.    Assessment     Patient SOB with activity.  Continued limitation in dorsiflexion    Elliott is progressing well towards his goals.   Pt prognosis is Good.     Pt will continue to benefit from skilled outpatient physical therapy to address the deficits listed in the problem list box on initial evaluation, provide pt/family education and to maximize pt's level of independence in the home and community environment.     Pt's spiritual, cultural and educational needs considered and pt agreeable to plan of care and goals.    Anticipated barriers to physical therapy: none    Previous Short Term Goals Status:             Short Term Goals (STG) # weeks Goal Review Date Reviewed Date Met   1. The patient will begin a written HEP 1 met 7/7/2020 7/7/2020   2. Increase ankle dorsiflexion to 5 degrees 3 progressing 9/2/2020          3. Decrease soft tissue tenderness to mild 3 progressing 9/2/2020                Long Term Goal Status:   continue per initial plan of care.  Long Term Goals (LTG) # weeks Goal Review Date Reviewed Date Met   1. The patient will be independent with his HEP for maintenance 6 progressing 9/2/2020       2. Increase ankle dorsiflexion to 8 degrees 6 progressing 9/2/2020          3. Decrease FOTO score to 39 % 6 progressing 9/2/2020          4. Patient will be able to ascend/descend 20 stairs/steps without c/o pain 6 progressing 9/2/2020                    Plan     Continue with physical therapy as per plan of care      Delfin GREEN  Mario Alberto, PT

## 2020-09-08 ENCOUNTER — CLINICAL SUPPORT (OUTPATIENT)
Dept: REHABILITATION | Facility: HOSPITAL | Age: 60
End: 2020-09-08
Payer: MEDICARE

## 2020-09-08 DIAGNOSIS — R26.9 GAIT ABNORMALITY: ICD-10-CM

## 2020-09-08 DIAGNOSIS — M67.88 ACHILLES TENDINOSIS: ICD-10-CM

## 2020-09-08 PROCEDURE — 97110 THERAPEUTIC EXERCISES: CPT | Mod: PN,CQ

## 2020-09-08 PROCEDURE — 97530 THERAPEUTIC ACTIVITIES: CPT | Mod: PN,CQ

## 2020-09-08 NOTE — PROGRESS NOTES
Physical Therapy Daily Treatment Note     Name: Elliott Ayala  Clinic Number: [id number]    Therapy Diagnosis:   Encounter Diagnoses   Name Primary?    Gait abnormality     Achilles tendinosis      Physician: Sharan Rice MD    Visit Date: 9/8/2020  Physician Orders: PT Eval and Treat   Medical Diagnosis from Referral: Achilles tendinosis  Evaluation Date: 7/1/2020  Authorization Period Expiration: 12/31/2020  Plan of Care Expiration: 9/25/2020  Visit # / Visits authorized: 15/ 20 (UPDATED POC 5/12, 11th visit total) [POC: 8/4-9/25, 2 x 6]    Time In: 430p - pt requested to leave early  Time Out: 500p  Total Billable Time: 30 minutes    Precautions: Standard and Fall    Subjective     Pt reports: he is sore today, he used the bands earlier  He was compliant with home exercise program, sporadic.  Response to previous treatment: soreness  Functional change:  Not wearing boot anymore    Pain: 4/10  Location: left foot      Objective       Elliott received therapeutic exercises to develop strength, ROM and flexibility for 15 minutes including:    Long sitting GSS 3 x 30 sec NP  Ankle pumps 3 x 10 NP  Seated heel slides 3 x 10   Towel scrunch x 30 NP  Ankle DF/IN/EV with BTB 3 x 10 NP  Wobble board A/P, M/L , cw/ccw x 30 each  [place]  x 2 minutes NP  Upright bike 4 min, level 3, seat 7 NP    TA x 10 minutes to improve balance, proprioception, weight bearing:  //bars:  SLS 3 x 15 sec  HR on airex 3 x 10  Weight shift R/L on airex x 1 minute  Lateral stepping GTB in //bars x 3 laps  GTB retro walk //bars x 3 laps NP  Slant board gasttoc stretch 3 x 30 sec L  Step downs 2 x 10 2 inch NP    Elliott received the following manual therapy techniques: STM to the lower left gastrocnemius and achilles regions for 5 minutes.    NOT PERFORMED:   Towel scrunch x 30 NP  Inversion/eversion 3 x 10 NP  CW, CCW x 30 each NP  Seated HR x 30 NP  Seated TR x 30 NP  Ankle pumps 3 x 10 NP  Seated HR x 30   Seated TR x 30     Home  Exercises Provided and Patient Education Provided     Education provided:   - cont HEP    Written Home Exercises Provided: Patient instructed to cont prior HEP.  Exercises were reviewed and Elliott was able to demonstrate them prior to the end of the session.  Elliott demonstrated good  understanding of the education provided.     See EMR under Patient Instructions for exercises provided prior visit.    Assessment     ROM has improved, but does remain limited.  TP along gastroc region has decreased in size and pain intensity.  DF 4 deg, PF approx 45 deg; pt with improved gait pattern-increased stance phase and toe off.  Pt will continue to benefit from skilled therapy to improve LLE weakness, gait pattern, and ROM.    Elliott is progressing well towards his goals.   Pt prognosis is Good.     Pt will continue to benefit from skilled outpatient physical therapy to address the deficits listed in the problem list box on initial evaluation, provide pt/family education and to maximize pt's level of independence in the home and community environment.     Pt's spiritual, cultural and educational needs considered and pt agreeable to plan of care and goals.    Anticipated barriers to physical therapy: none    Previous Short Term Goals Status:             Short Term Goals (STG) # weeks Goal Review Date Reviewed Date Met   1. The patient will begin a written HEP 1 met 7/7/2020 7/7/2020   2. Increase ankle dorsiflexion to 5 degrees 3 progressing 9/8/2020          3. Decrease soft tissue tenderness to mild 3 progressing 9/8/2020                Long Term Goal Status:   continue per initial plan of care.  Long Term Goals (LTG) # weeks Goal Review Date Reviewed Date Met   1. The patient will be independent with his HEP for maintenance 6 progressing 9/8/2020       2. Increase ankle dorsiflexion to 8 degrees 6 progressing 9/8/2020          3. Decrease FOTO score to 39 % 6 progressing 9/8/2020          4. Patient will be able to  ascend/descend 20 stairs/steps without c/o pain 6 progressing 9/8/2020                    Plan     Continue with physical therapy as per plan of care      Mei Nielsen, PTA

## 2020-09-09 ENCOUNTER — OFFICE VISIT (OUTPATIENT)
Dept: SPORTS MEDICINE | Facility: CLINIC | Age: 60
End: 2020-09-09
Payer: MEDICARE

## 2020-09-09 VITALS
SYSTOLIC BLOOD PRESSURE: 122 MMHG | HEIGHT: 69 IN | DIASTOLIC BLOOD PRESSURE: 76 MMHG | HEART RATE: 86 BPM | TEMPERATURE: 98 F | BODY MASS INDEX: 46.65 KG/M2 | OXYGEN SATURATION: 95 % | WEIGHT: 315 LBS

## 2020-09-09 DIAGNOSIS — M76.62 ACHILLES TENDINITIS, LEFT LEG: ICD-10-CM

## 2020-09-09 DIAGNOSIS — M67.88 ACHILLES TENDINOSIS: Primary | ICD-10-CM

## 2020-09-09 PROCEDURE — 99999 PR PBB SHADOW E&M-EST. PATIENT-LVL III: ICD-10-PCS | Mod: PBBFAC,,, | Performed by: FAMILY MEDICINE

## 2020-09-09 PROCEDURE — 99213 OFFICE O/P EST LOW 20 MIN: CPT | Mod: S$PBB,,, | Performed by: FAMILY MEDICINE

## 2020-09-09 PROCEDURE — 99213 PR OFFICE/OUTPT VISIT, EST, LEVL III, 20-29 MIN: ICD-10-PCS | Mod: S$PBB,,, | Performed by: FAMILY MEDICINE

## 2020-09-09 PROCEDURE — 99213 OFFICE O/P EST LOW 20 MIN: CPT | Mod: PBBFAC,PO | Performed by: FAMILY MEDICINE

## 2020-09-09 PROCEDURE — 99999 PR PBB SHADOW E&M-EST. PATIENT-LVL III: CPT | Mod: PBBFAC,,, | Performed by: FAMILY MEDICINE

## 2020-09-09 RX ORDER — DICLOFENAC SODIUM 10 MG/G
4 GEL TOPICAL 4 TIMES DAILY PRN
Qty: 100 G | Refills: 0 | Status: SHIPPED | OUTPATIENT
Start: 2020-09-09 | End: 2022-03-30

## 2020-09-09 NOTE — PROGRESS NOTES
Subjective:          Chief Complaint: Elliott Ayala is a 60 y.o. male who had concerns including Follow-up.    Here for follow-up of left Achilles tendinitis.  Has been attending physical therapy and using oral anti-inflammatories as treatment.  Since his last visit his condition has improved.  He is now able to weightbear without any assistance and without using boot.  His range of motion has also improved drastically though he states he is not back to normal quite yet.  Physical therapy has been teaching him a home exercise program which he states he is compliant with.  He states he is comfortable with his results and feels like he is able to continue with just home exercise program for the time being.          Review of Systems   Constitution: Negative for chills and decreased appetite.   HENT: Negative for congestion and sore throat.    Eyes: Negative for blurred vision.   Cardiovascular: Negative for chest pain, dyspnea on exertion and palpitations.   Respiratory: Negative for cough and shortness of breath.    Skin: Negative for rash.   Neurological: Negative for difficulty with concentration, disturbances in coordination and headaches.   Psychiatric/Behavioral: Negative for altered mental status, depression, hallucinations, memory loss and suicidal ideas.                   Objective:        General: Elliott is well-developed, well-nourished, appears stated age, in no acute distress, alert and oriented to time, place and person.     General    Nursing note and vitals reviewed.  Constitutional: He is oriented to person, place, and time. He appears well-developed and well-nourished.   HENT:   Nose: Nose normal.   Eyes: EOM are normal. Pupils are equal, round, and reactive to light.   Neck: Normal range of motion. Neck supple.   Cardiovascular: Normal rate.    Pulmonary/Chest: Effort normal.   Abdominal: Soft.   Neurological: He is alert and oriented to person, place, and time. He has normal reflexes.   Psychiatric:  He has a normal mood and affect. His behavior is normal. Judgment and thought content normal.         Right Ankle/Foot Exam   Right ankle exam is normal.    Inspection   Scars: present    Left Ankle/Foot Exam     Range of Motion   Ankle Joint  Dorsiflexion: normal   Plantar flexion: normal     Subtalar Joint   Inversion:  35 normal   Eversion:  25 normal   Black Test:  normal    Tests   Anterior drawer: negative  Varus tilt: negative    Other   Foot Crepitus:  absent  Ankle Crepitus: absent  Sensation: normal      Vascular Exam       Left Pulses  Dorsalis Pedis:      2+                      Assessment:       Encounter Diagnoses   Name Primary?    Achilles tendinosis Yes    Achilles tendinitis, left leg           Plan:       Discussed treatment options with patient including rest, heat, ice, anti-inflammatories both over-the-counter prescription, bracing, physical therapy both formal and at home, injection therapy with corticosteroids and biological agents, and surgical options.  Patient elected for using home exercise program for continuing his therapy and saving his physical therapy sessions for later if they are needed.  Also use Mobic as needed for inflammation and also prescribe patient Voltaren gel for any flare of Achilles tendon pain.  He may follow-up here as needed.  If there is an exacerbation or worsening of his condition PRP injection is still an option.                    Patient questionnaires may have been collected.

## 2020-09-09 NOTE — PATIENT INSTRUCTIONS
Calf Raise (Strength)    1. Stand up straight with both feet flat on the floor, slightly apart. Hold onto a sturdy chair, railing, counter, or table.  2. Raise both heels so youre standing on the balls of your feet. Dont lock your knees or arch your back. Hold for 5 seconds. Then slowly lower your heels back down to the floor.  3. Repeat 10 times, or as instructed.  4. Do this exercise 3 times a day, or as instructed.     Challenge yourself  As you become stronger, do this exercise on one foot at a time.   Date Last Reviewed: 3/10/2016  © 1490-3761 [employer]. [address] [place] [address], [place] [address]. All rights reserved. This information is not intended as a substitute for professional medical care. Always follow your healthcare professional's instructions.

## 2020-11-05 ENCOUNTER — LAB VISIT (OUTPATIENT)
Dept: PRIMARY CARE CLINIC | Facility: OTHER | Age: 60
End: 2020-11-05
Attending: INTERNAL MEDICINE
Payer: MEDICARE

## 2020-11-05 DIAGNOSIS — Z03.818 ENCOUNTER FOR OBSERVATION FOR SUSPECTED EXPOSURE TO OTHER BIOLOGICAL AGENTS RULED OUT: ICD-10-CM

## 2020-11-05 PROCEDURE — U0003 INFECTIOUS AGENT DETECTION BY NUCLEIC ACID (DNA OR RNA); SEVERE ACUTE RESPIRATORY SYNDROME CORONAVIRUS 2 (SARS-COV-2) (CORONAVIRUS DISEASE [COVID-19]), AMPLIFIED PROBE TECHNIQUE, MAKING USE OF HIGH THROUGHPUT TECHNOLOGIES AS DESCRIBED BY CMS-2020-01-R: HCPCS

## 2020-11-06 LAB — SARS-COV-2 RNA RESP QL NAA+PROBE: NOT DETECTED

## 2021-02-08 NOTE — PROGRESS NOTES
"  Physical Therapy Daily Treatment Note     Name: Elliott Ayala  Clinic Number: [id number]    Therapy Diagnosis:   Encounter Diagnosis   Name Primary?    Gait abnormality      Physician: Sharan Rice MD    Visit Date: 7/16/2020  Physician Orders: PT Eval and Treat   Medical Diagnosis from Referral: Achilles tendinosis  Evaluation Date: 7/1/2020  Authorization Period Expiration: 12/31/2020  Plan of Care Expiration: 8/21/2020  Visit # / Visits authorized: 5/ 20 (POC 2 x 6)    Time In: 1214p  Time Out: 100p  Total Billable Time: 46 minutes    Precautions: Standard and Fall    Subjective     Pt reports: numbness L foot, feels foot is "disconnected" occasionally  He was compliant with home exercise program, sporadic.  Response to previous treatment: soreness  Functional change: none stated    Pain: 2/10  Location: left foot      Objective     Elliott received therapeutic exercises to develop strength, ROM and flexibility for 36 minutes including:    Long sitting HSS 3 x 30 sec   Long sitting GSS 3 x 30 sec  Ankle pumps 3 x 10  Inversion/eversion 3 x 10  Alphabet x 1  Seated heel slides 2 x 10 (VC align foot with knee) NP  Seated HR x 30  Seated TR x 30 NP  Towel scrunch x 30  + Ankle 3 way (dorsiflexion, inversion, eversion) with RTB 2 x 10  +Wobble board: cw, ccw, sup, pro, df, pf x 30 each, assistance with sup/pro    Elliott received the following manual therapy techniques: STM to LLE including M/L ankle, gastroc, and achilles regions. IASTM to medial lower calf and achilles region for 10 minutes.    Home Exercises Provided and Patient Education Provided     Education provided:   - cont HEP, desensitization at heel region    Written Home Exercises Provided: Patient instructed to cont prior HEP.  Exercises were reviewed and Elliott was able to demonstrate them prior to the end of the session.  Elliott demonstrated good  understanding of the education provided.     See EMR under Patient Instructions for exercises provided prior " visit.    Assessment     TP's remain in achilles region, but are decreasing.  Improvement in AROM circles.  Pt able to perform sup/pro indep after several AAROM reps.  Increased wb through L LE today.  Elliott is progressing well towards his goals.   Pt prognosis is Good.     Pt will continue to benefit from skilled outpatient physical therapy to address the deficits listed in the problem list box on initial evaluation, provide pt/family education and to maximize pt's level of independence in the home and community environment.     Pt's spiritual, cultural and educational needs considered and pt agreeable to plan of care and goals.    Anticipated barriers to physical therapy: none    Goals:  Short Term Goals (STG) # weeks Goal Review Date Reviewed Date Met   1. The patient will begin a written HEP 1 met 7/7/2020 7/7/2020   2. Increase ankle dorsiflexion to 5 degrees 3 progressing 7/16/2020       3. Decrease soft tissue tenderness to mild 3 progressing 7/16/2020          Long Term Goals (LTG) # weeks Goal Review Date Reviewed Date Met   1. The patient will be independent with his HEP for maintenance 6 progressing 7/16/2020       2. Increase ankle dorsiflexion to 8 degrees 6 progressing 7/16/2020       3. Decrease FOTO score to 39 % 6 progressing 7/16/2020       4. Patient will be able to ascend/descend 20 stairs/steps without c/o pain 6 progressing 7/16/2020             Plan     Cont per POC, stretches, ROM    I certify that I was present in the room directing the student in service delivery and guiding them using my skilled judgment. As the co-signing therapist I have reviewed the students documentation and am responsible for the treatment, assessment, and plan.     Marilu Mendenhall, LOUIS Nielsen, PTA     Universal Safety Interventions

## 2021-04-29 ENCOUNTER — PATIENT MESSAGE (OUTPATIENT)
Dept: RESEARCH | Facility: HOSPITAL | Age: 61
End: 2021-04-29

## 2021-06-01 ENCOUNTER — OFFICE VISIT (OUTPATIENT)
Dept: FAMILY MEDICINE | Facility: CLINIC | Age: 61
End: 2021-06-01
Payer: MEDICARE

## 2021-06-01 VITALS
WEIGHT: 315 LBS | DIASTOLIC BLOOD PRESSURE: 82 MMHG | HEART RATE: 81 BPM | BODY MASS INDEX: 46.65 KG/M2 | OXYGEN SATURATION: 98 % | HEIGHT: 69 IN | TEMPERATURE: 98 F | SYSTOLIC BLOOD PRESSURE: 138 MMHG

## 2021-06-01 DIAGNOSIS — M54.50 ACUTE LEFT-SIDED LOW BACK PAIN WITHOUT SCIATICA: Primary | ICD-10-CM

## 2021-06-01 DIAGNOSIS — M62.838 MUSCLE SPASM: ICD-10-CM

## 2021-06-01 DIAGNOSIS — R73.03 PREDIABETES: ICD-10-CM

## 2021-06-01 DIAGNOSIS — M25.572 PAIN OF JOINT OF LEFT ANKLE AND FOOT: ICD-10-CM

## 2021-06-01 DIAGNOSIS — Z12.11 COLON CANCER SCREENING: ICD-10-CM

## 2021-06-01 DIAGNOSIS — R35.0 URINARY FREQUENCY: ICD-10-CM

## 2021-06-01 PROCEDURE — 99215 OFFICE O/P EST HI 40 MIN: CPT | Mod: PBBFAC,PO | Performed by: NURSE PRACTITIONER

## 2021-06-01 PROCEDURE — 99214 OFFICE O/P EST MOD 30 MIN: CPT | Mod: S$PBB,,, | Performed by: NURSE PRACTITIONER

## 2021-06-01 PROCEDURE — 99214 PR OFFICE/OUTPT VISIT, EST, LEVL IV, 30-39 MIN: ICD-10-PCS | Mod: S$PBB,,, | Performed by: NURSE PRACTITIONER

## 2021-06-01 PROCEDURE — 99999 PR PBB SHADOW E&M-EST. PATIENT-LVL V: ICD-10-PCS | Mod: PBBFAC,,, | Performed by: NURSE PRACTITIONER

## 2021-06-01 PROCEDURE — 99999 PR PBB SHADOW E&M-EST. PATIENT-LVL V: CPT | Mod: PBBFAC,,, | Performed by: NURSE PRACTITIONER

## 2021-06-01 RX ORDER — TIZANIDINE 4 MG/1
4 TABLET ORAL EVERY 6 HOURS PRN
Qty: 30 TABLET | Refills: 1 | Status: SHIPPED | OUTPATIENT
Start: 2021-06-01 | End: 2021-06-11

## 2021-06-02 ENCOUNTER — PATIENT MESSAGE (OUTPATIENT)
Dept: GASTROENTEROLOGY | Facility: CLINIC | Age: 61
End: 2021-06-02

## 2021-06-02 ENCOUNTER — TELEPHONE (OUTPATIENT)
Dept: ORTHOPEDICS | Facility: CLINIC | Age: 61
End: 2021-06-02

## 2021-06-08 ENCOUNTER — LAB VISIT (OUTPATIENT)
Dept: LAB | Facility: HOSPITAL | Age: 61
End: 2021-06-08
Attending: NURSE PRACTITIONER
Payer: MEDICARE

## 2021-06-08 DIAGNOSIS — Z13.1 SCREENING FOR DIABETES MELLITUS: ICD-10-CM

## 2021-06-08 DIAGNOSIS — M79.18 MYALGIA, OTHER SITE: ICD-10-CM

## 2021-06-08 DIAGNOSIS — E78.1 HYPERTRIGLYCERIDEMIA: ICD-10-CM

## 2021-06-08 DIAGNOSIS — Z12.5 ENCOUNTER FOR SCREENING FOR MALIGNANT NEOPLASM OF PROSTATE: ICD-10-CM

## 2021-06-08 DIAGNOSIS — R73.03 PREDIABETES: ICD-10-CM

## 2021-06-08 LAB
25(OH)D3+25(OH)D2 SERPL-MCNC: 13 NG/ML (ref 30–96)
ALBUMIN SERPL BCP-MCNC: 3.5 G/DL (ref 3.5–5.2)
ALP SERPL-CCNC: 59 U/L (ref 55–135)
ALT SERPL W/O P-5'-P-CCNC: 24 U/L (ref 10–44)
ANION GAP SERPL CALC-SCNC: 12 MMOL/L (ref 8–16)
AST SERPL-CCNC: 20 U/L (ref 10–40)
BASOPHILS # BLD AUTO: 0.04 K/UL (ref 0–0.2)
BASOPHILS NFR BLD: 0.6 % (ref 0–1.9)
BILIRUB SERPL-MCNC: 0.5 MG/DL (ref 0.1–1)
BUN SERPL-MCNC: 10 MG/DL (ref 6–20)
CALCIUM SERPL-MCNC: 9.3 MG/DL (ref 8.7–10.5)
CHLORIDE SERPL-SCNC: 105 MMOL/L (ref 95–110)
CHOLEST SERPL-MCNC: 172 MG/DL (ref 120–199)
CHOLEST/HDLC SERPL: 4.6 {RATIO} (ref 2–5)
CO2 SERPL-SCNC: 25 MMOL/L (ref 23–29)
COMPLEXED PSA SERPL-MCNC: 2.1 NG/ML (ref 0–4)
CREAT SERPL-MCNC: 1 MG/DL (ref 0.5–1.4)
DIFFERENTIAL METHOD: ABNORMAL
EOSINOPHIL # BLD AUTO: 0.2 K/UL (ref 0–0.5)
EOSINOPHIL NFR BLD: 2.5 % (ref 0–8)
ERYTHROCYTE [DISTWIDTH] IN BLOOD BY AUTOMATED COUNT: 13.3 % (ref 11.5–14.5)
EST. GFR  (AFRICAN AMERICAN): >60 ML/MIN/1.73 M^2
EST. GFR  (NON AFRICAN AMERICAN): >60 ML/MIN/1.73 M^2
ESTIMATED AVG GLUCOSE: 134 MG/DL (ref 68–131)
GLUCOSE SERPL-MCNC: 124 MG/DL (ref 70–110)
HBA1C MFR BLD: 6.3 % (ref 4–5.6)
HCT VFR BLD AUTO: 42.7 % (ref 40–54)
HDLC SERPL-MCNC: 37 MG/DL (ref 40–75)
HDLC SERPL: 21.5 % (ref 20–50)
HGB BLD-MCNC: 13.8 G/DL (ref 14–18)
IMM GRANULOCYTES # BLD AUTO: 0.03 K/UL (ref 0–0.04)
IMM GRANULOCYTES NFR BLD AUTO: 0.4 % (ref 0–0.5)
LDLC SERPL CALC-MCNC: 112 MG/DL (ref 63–159)
LYMPHOCYTES # BLD AUTO: 2.2 K/UL (ref 1–4.8)
LYMPHOCYTES NFR BLD: 32.6 % (ref 18–48)
MCH RBC QN AUTO: 27.5 PG (ref 27–31)
MCHC RBC AUTO-ENTMCNC: 32.3 G/DL (ref 32–36)
MCV RBC AUTO: 85 FL (ref 82–98)
MONOCYTES # BLD AUTO: 0.7 K/UL (ref 0.3–1)
MONOCYTES NFR BLD: 9.9 % (ref 4–15)
NEUTROPHILS # BLD AUTO: 3.6 K/UL (ref 1.8–7.7)
NEUTROPHILS NFR BLD: 54 % (ref 38–73)
NONHDLC SERPL-MCNC: 135 MG/DL
NRBC BLD-RTO: 0 /100 WBC
PLATELET # BLD AUTO: 248 K/UL (ref 150–450)
PMV BLD AUTO: 11 FL (ref 9.2–12.9)
POTASSIUM SERPL-SCNC: 4 MMOL/L (ref 3.5–5.1)
PROT SERPL-MCNC: 7 G/DL (ref 6–8.4)
RBC # BLD AUTO: 5.02 M/UL (ref 4.6–6.2)
SODIUM SERPL-SCNC: 142 MMOL/L (ref 136–145)
T4 FREE SERPL-MCNC: 1 NG/DL (ref 0.71–1.51)
TRIGL SERPL-MCNC: 115 MG/DL (ref 30–150)
TSH SERPL DL<=0.005 MIU/L-ACNC: 4.45 UIU/ML (ref 0.4–4)
WBC # BLD AUTO: 6.68 K/UL (ref 3.9–12.7)

## 2021-06-08 PROCEDURE — 82306 VITAMIN D 25 HYDROXY: CPT | Performed by: NURSE PRACTITIONER

## 2021-06-08 PROCEDURE — 80053 COMPREHEN METABOLIC PANEL: CPT | Performed by: NURSE PRACTITIONER

## 2021-06-08 PROCEDURE — 83036 HEMOGLOBIN GLYCOSYLATED A1C: CPT | Performed by: NURSE PRACTITIONER

## 2021-06-08 PROCEDURE — 84153 ASSAY OF PSA TOTAL: CPT | Performed by: NURSE PRACTITIONER

## 2021-06-08 PROCEDURE — 85025 COMPLETE CBC W/AUTO DIFF WBC: CPT | Performed by: NURSE PRACTITIONER

## 2021-06-08 PROCEDURE — 80061 LIPID PANEL: CPT | Performed by: NURSE PRACTITIONER

## 2021-06-08 PROCEDURE — 84443 ASSAY THYROID STIM HORMONE: CPT | Performed by: NURSE PRACTITIONER

## 2021-06-08 PROCEDURE — 36415 COLL VENOUS BLD VENIPUNCTURE: CPT | Mod: PO | Performed by: NURSE PRACTITIONER

## 2021-06-08 PROCEDURE — 84439 ASSAY OF FREE THYROXINE: CPT | Performed by: NURSE PRACTITIONER

## 2021-06-11 ENCOUNTER — TELEPHONE (OUTPATIENT)
Dept: FAMILY MEDICINE | Facility: CLINIC | Age: 61
End: 2021-06-11

## 2021-06-15 ENCOUNTER — OFFICE VISIT (OUTPATIENT)
Dept: FAMILY MEDICINE | Facility: CLINIC | Age: 61
End: 2021-06-15
Payer: MEDICARE

## 2021-06-15 VITALS
WEIGHT: 315 LBS | BODY MASS INDEX: 46.65 KG/M2 | OXYGEN SATURATION: 100 % | HEART RATE: 90 BPM | SYSTOLIC BLOOD PRESSURE: 138 MMHG | HEIGHT: 69 IN | DIASTOLIC BLOOD PRESSURE: 86 MMHG

## 2021-06-15 DIAGNOSIS — R73.03 PREDIABETES: ICD-10-CM

## 2021-06-15 DIAGNOSIS — E55.9 VITAMIN D DEFICIENCY: ICD-10-CM

## 2021-06-15 DIAGNOSIS — E03.9 HYPOTHYROIDISM, UNSPECIFIED TYPE: Primary | ICD-10-CM

## 2021-06-15 DIAGNOSIS — R73.9 HYPERGLYCEMIA, UNSPECIFIED: ICD-10-CM

## 2021-06-15 PROCEDURE — 99214 OFFICE O/P EST MOD 30 MIN: CPT | Mod: PBBFAC,PO | Performed by: NURSE PRACTITIONER

## 2021-06-15 PROCEDURE — 99214 OFFICE O/P EST MOD 30 MIN: CPT | Mod: S$PBB,,, | Performed by: NURSE PRACTITIONER

## 2021-06-15 PROCEDURE — 99999 PR PBB SHADOW E&M-EST. PATIENT-LVL IV: ICD-10-PCS | Mod: PBBFAC,,, | Performed by: NURSE PRACTITIONER

## 2021-06-15 PROCEDURE — 99999 PR PBB SHADOW E&M-EST. PATIENT-LVL IV: CPT | Mod: PBBFAC,,, | Performed by: NURSE PRACTITIONER

## 2021-06-15 PROCEDURE — 99214 PR OFFICE/OUTPT VISIT, EST, LEVL IV, 30-39 MIN: ICD-10-PCS | Mod: S$PBB,,, | Performed by: NURSE PRACTITIONER

## 2021-06-15 RX ORDER — LEVOTHYROXINE SODIUM 75 UG/1
75 TABLET ORAL
Qty: 30 TABLET | Refills: 11 | Status: SHIPPED | OUTPATIENT
Start: 2021-06-15 | End: 2022-06-13 | Stop reason: SDUPTHER

## 2021-06-15 RX ORDER — ERGOCALCIFEROL 1.25 MG/1
50000 CAPSULE ORAL
Qty: 12 CAPSULE | Refills: 0 | Status: SHIPPED | OUTPATIENT
Start: 2021-06-15 | End: 2021-10-29

## 2021-06-16 ENCOUNTER — CLINICAL SUPPORT (OUTPATIENT)
Dept: DIABETES | Facility: CLINIC | Age: 61
End: 2021-06-16
Payer: MEDICARE

## 2021-06-16 DIAGNOSIS — R73.9 HYPERGLYCEMIA, UNSPECIFIED: ICD-10-CM

## 2021-06-16 DIAGNOSIS — R73.03 PREDIABETES: ICD-10-CM

## 2021-06-16 PROCEDURE — 99999 PR PBB SHADOW E&M-EST. PATIENT-LVL II: ICD-10-PCS | Mod: PBBFAC,,, | Performed by: DIETITIAN, REGISTERED

## 2021-06-16 PROCEDURE — 99999 PR PBB SHADOW E&M-EST. PATIENT-LVL II: CPT | Mod: PBBFAC,,, | Performed by: DIETITIAN, REGISTERED

## 2021-06-16 PROCEDURE — 99212 OFFICE O/P EST SF 10 MIN: CPT | Mod: PBBFAC,PO | Performed by: DIETITIAN, REGISTERED

## 2021-06-16 PROCEDURE — G0108 DIAB MANAGE TRN  PER INDIV: HCPCS | Mod: PBBFAC,PO | Performed by: DIETITIAN, REGISTERED

## 2021-06-24 ENCOUNTER — TELEPHONE (OUTPATIENT)
Dept: GASTROENTEROLOGY | Facility: CLINIC | Age: 61
End: 2021-06-24

## 2021-06-24 DIAGNOSIS — Z12.11 SCREENING FOR COLON CANCER: Primary | ICD-10-CM

## 2021-07-01 ENCOUNTER — PATIENT MESSAGE (OUTPATIENT)
Dept: ADMINISTRATIVE | Facility: OTHER | Age: 61
End: 2021-07-01

## 2021-07-01 VITALS — BODY MASS INDEX: 46.65 KG/M2 | WEIGHT: 315 LBS | HEIGHT: 69 IN

## 2021-07-12 ENCOUNTER — TELEPHONE (OUTPATIENT)
Dept: GASTROENTEROLOGY | Facility: CLINIC | Age: 61
End: 2021-07-12

## 2021-07-27 ENCOUNTER — LAB VISIT (OUTPATIENT)
Dept: LAB | Facility: HOSPITAL | Age: 61
End: 2021-07-27
Payer: MEDICARE

## 2021-07-27 DIAGNOSIS — E03.9 HYPOTHYROIDISM, UNSPECIFIED TYPE: ICD-10-CM

## 2021-07-27 PROCEDURE — 36415 COLL VENOUS BLD VENIPUNCTURE: CPT | Mod: PO | Performed by: NURSE PRACTITIONER

## 2021-07-27 PROCEDURE — 84443 ASSAY THYROID STIM HORMONE: CPT | Performed by: NURSE PRACTITIONER

## 2021-07-27 PROCEDURE — 84439 ASSAY OF FREE THYROXINE: CPT | Performed by: NURSE PRACTITIONER

## 2021-07-28 LAB
T4 FREE SERPL-MCNC: 1.12 NG/DL (ref 0.71–1.51)
TSH SERPL DL<=0.005 MIU/L-ACNC: 1.45 UIU/ML (ref 0.4–4)

## 2021-09-24 ENCOUNTER — TELEPHONE (OUTPATIENT)
Dept: FAMILY MEDICINE | Facility: CLINIC | Age: 61
End: 2021-09-24

## 2021-09-28 ENCOUNTER — OFFICE VISIT (OUTPATIENT)
Dept: FAMILY MEDICINE | Facility: CLINIC | Age: 61
End: 2021-09-28
Payer: MEDICARE

## 2021-09-28 VITALS
HEART RATE: 98 BPM | BODY MASS INDEX: 46.65 KG/M2 | HEIGHT: 69 IN | SYSTOLIC BLOOD PRESSURE: 134 MMHG | OXYGEN SATURATION: 99 % | DIASTOLIC BLOOD PRESSURE: 80 MMHG | TEMPERATURE: 98 F | WEIGHT: 315 LBS

## 2021-09-28 DIAGNOSIS — M79.673 PAIN OF FOOT, UNSPECIFIED LATERALITY: ICD-10-CM

## 2021-09-28 DIAGNOSIS — R42 DIZZINESS: ICD-10-CM

## 2021-09-28 DIAGNOSIS — M77.50 TENDINITIS OF ANKLE: ICD-10-CM

## 2021-09-28 DIAGNOSIS — R07.9 CHEST PAIN, UNSPECIFIED TYPE: Primary | ICD-10-CM

## 2021-09-28 DIAGNOSIS — E03.9 HYPOTHYROIDISM, UNSPECIFIED TYPE: ICD-10-CM

## 2021-09-28 DIAGNOSIS — G89.29 CHRONIC LEFT SHOULDER PAIN: ICD-10-CM

## 2021-09-28 DIAGNOSIS — M25.512 CHRONIC LEFT SHOULDER PAIN: ICD-10-CM

## 2021-09-28 DIAGNOSIS — H53.8 BLURRY VISION: ICD-10-CM

## 2021-09-28 PROCEDURE — 99999 PR PBB SHADOW E&M-EST. PATIENT-LVL V: CPT | Mod: PBBFAC,,, | Performed by: NURSE PRACTITIONER

## 2021-09-28 PROCEDURE — 99214 OFFICE O/P EST MOD 30 MIN: CPT | Mod: S$PBB,,, | Performed by: NURSE PRACTITIONER

## 2021-09-28 PROCEDURE — 99999 PR PBB SHADOW E&M-EST. PATIENT-LVL V: ICD-10-PCS | Mod: PBBFAC,,, | Performed by: NURSE PRACTITIONER

## 2021-09-28 PROCEDURE — 99215 OFFICE O/P EST HI 40 MIN: CPT | Mod: PBBFAC,PO | Performed by: NURSE PRACTITIONER

## 2021-09-28 PROCEDURE — 99214 PR OFFICE/OUTPT VISIT, EST, LEVL IV, 30-39 MIN: ICD-10-PCS | Mod: S$PBB,,, | Performed by: NURSE PRACTITIONER

## 2021-09-28 RX ORDER — OXYMETAZOLINE HYDROCHLORIDE 0.05 G/100ML
SPRAY, METERED NASAL DAILY PRN
COMMUNITY
End: 2022-03-30

## 2021-09-29 ENCOUNTER — LAB VISIT (OUTPATIENT)
Dept: LAB | Facility: HOSPITAL | Age: 61
End: 2021-09-29
Attending: NURSE PRACTITIONER
Payer: MEDICARE

## 2021-09-29 DIAGNOSIS — M25.512 LEFT SHOULDER PAIN, UNSPECIFIED CHRONICITY: Primary | ICD-10-CM

## 2021-09-29 DIAGNOSIS — E03.9 HYPOTHYROIDISM, UNSPECIFIED TYPE: ICD-10-CM

## 2021-09-29 LAB — TSH SERPL DL<=0.005 MIU/L-ACNC: 1.75 UIU/ML (ref 0.4–4)

## 2021-09-29 PROCEDURE — 84443 ASSAY THYROID STIM HORMONE: CPT | Performed by: NURSE PRACTITIONER

## 2021-09-29 PROCEDURE — 36415 COLL VENOUS BLD VENIPUNCTURE: CPT | Mod: PO | Performed by: NURSE PRACTITIONER

## 2021-10-06 ENCOUNTER — OFFICE VISIT (OUTPATIENT)
Dept: PODIATRY | Facility: CLINIC | Age: 61
End: 2021-10-06
Payer: MEDICARE

## 2021-10-06 VITALS — BODY MASS INDEX: 46.65 KG/M2 | WEIGHT: 315 LBS | HEIGHT: 69 IN

## 2021-10-06 DIAGNOSIS — M67.472 GANGLION CYST OF LEFT FOOT: ICD-10-CM

## 2021-10-06 DIAGNOSIS — R22.42 LOCALIZED SWELLING, MASS, OR LUMP OF LEFT LOWER EXTREMITY: ICD-10-CM

## 2021-10-06 DIAGNOSIS — M79.672 LEFT FOOT PAIN: ICD-10-CM

## 2021-10-06 DIAGNOSIS — M77.50 TENDINITIS OF ANKLE: ICD-10-CM

## 2021-10-06 DIAGNOSIS — G89.29 CHRONIC PAIN OF LEFT ANKLE: ICD-10-CM

## 2021-10-06 DIAGNOSIS — M25.572 PAIN OF JOINT OF LEFT ANKLE AND FOOT: ICD-10-CM

## 2021-10-06 DIAGNOSIS — S86.002S ACHILLES TENDON INJURY, LEFT, SEQUELA: Primary | ICD-10-CM

## 2021-10-06 DIAGNOSIS — M25.572 CHRONIC PAIN OF LEFT ANKLE: ICD-10-CM

## 2021-10-06 DIAGNOSIS — G57.52 TARSAL TUNNEL SYNDROME, LEFT: ICD-10-CM

## 2021-10-06 PROCEDURE — 99204 PR OFFICE/OUTPT VISIT, NEW, LEVL IV, 45-59 MIN: ICD-10-PCS | Mod: S$GLB,,, | Performed by: PODIATRIST

## 2021-10-06 PROCEDURE — 99204 OFFICE O/P NEW MOD 45 MIN: CPT | Mod: S$GLB,,, | Performed by: PODIATRIST

## 2021-10-18 ENCOUNTER — HOSPITAL ENCOUNTER (OUTPATIENT)
Dept: RADIOLOGY | Facility: HOSPITAL | Age: 61
Discharge: HOME | End: 2021-10-18
Attending: PODIATRIST
Payer: MEDICARE

## 2021-10-18 DIAGNOSIS — M25.572 PAIN OF JOINT OF LEFT ANKLE AND FOOT: ICD-10-CM

## 2021-10-18 DIAGNOSIS — S86.002S ACHILLES TENDON INJURY, LEFT, SEQUELA: ICD-10-CM

## 2021-10-18 DIAGNOSIS — R22.42 LOCALIZED SWELLING, MASS, OR LUMP OF LEFT LOWER EXTREMITY: ICD-10-CM

## 2021-10-18 DIAGNOSIS — M67.472 GANGLION CYST OF LEFT FOOT: ICD-10-CM

## 2021-10-18 PROCEDURE — 73721 MRI JNT OF LWR EXTRE W/O DYE: CPT | Mod: TC,PO,LT

## 2021-10-19 ENCOUNTER — PATIENT MESSAGE (OUTPATIENT)
Dept: PODIATRY | Facility: CLINIC | Age: 61
End: 2021-10-19

## 2021-10-19 ENCOUNTER — TELEPHONE (OUTPATIENT)
Dept: PODIATRY | Facility: CLINIC | Age: 61
End: 2021-10-19

## 2021-10-26 ENCOUNTER — OFFICE VISIT (OUTPATIENT)
Dept: PODIATRY | Facility: CLINIC | Age: 61
End: 2021-10-26
Payer: MEDICARE

## 2021-10-26 VITALS — RESPIRATION RATE: 16 BRPM | HEIGHT: 69 IN | BODY MASS INDEX: 46.65 KG/M2 | WEIGHT: 315 LBS

## 2021-10-26 DIAGNOSIS — S86.002S ACHILLES TENDON INJURY, LEFT, SEQUELA: Primary | ICD-10-CM

## 2021-10-26 DIAGNOSIS — R22.42 LOCALIZED SWELLING, MASS, OR LUMP OF LEFT LOWER EXTREMITY: ICD-10-CM

## 2021-10-26 DIAGNOSIS — M25.572 PAIN OF JOINT OF LEFT ANKLE AND FOOT: ICD-10-CM

## 2021-10-26 DIAGNOSIS — M67.472 GANGLION CYST OF LEFT FOOT: ICD-10-CM

## 2021-10-26 DIAGNOSIS — M25.572 CHRONIC PAIN OF LEFT ANKLE: ICD-10-CM

## 2021-10-26 DIAGNOSIS — G89.29 CHRONIC PAIN OF LEFT ANKLE: ICD-10-CM

## 2021-10-26 DIAGNOSIS — M79.672 LEFT FOOT PAIN: ICD-10-CM

## 2021-10-26 DIAGNOSIS — M77.50 TENDINITIS OF ANKLE: ICD-10-CM

## 2021-10-26 DIAGNOSIS — G57.52 TARSAL TUNNEL SYNDROME, LEFT: ICD-10-CM

## 2021-10-26 PROCEDURE — 99214 PR OFFICE/OUTPT VISIT, EST, LEVL IV, 30-39 MIN: ICD-10-PCS | Mod: S$GLB,,, | Performed by: PODIATRIST

## 2021-10-26 PROCEDURE — 99214 OFFICE O/P EST MOD 30 MIN: CPT | Mod: S$GLB,,, | Performed by: PODIATRIST

## 2021-10-27 ENCOUNTER — TELEPHONE (OUTPATIENT)
Dept: RADIOLOGY | Facility: HOSPITAL | Age: 61
End: 2021-10-27
Payer: MEDICARE

## 2021-10-29 ENCOUNTER — OFFICE VISIT (OUTPATIENT)
Dept: OPHTHALMOLOGY | Facility: CLINIC | Age: 61
End: 2021-10-29
Payer: MEDICARE

## 2021-10-29 ENCOUNTER — TELEPHONE (OUTPATIENT)
Dept: RADIOLOGY | Facility: HOSPITAL | Age: 61
End: 2021-10-29
Payer: MEDICARE

## 2021-10-29 DIAGNOSIS — H02.59 FLOPPY EYELID SYNDROME: ICD-10-CM

## 2021-10-29 DIAGNOSIS — H40.053 OCULAR HYPERTENSION, BILATERAL: ICD-10-CM

## 2021-10-29 DIAGNOSIS — H50.15 ALTERNATING EXOTROPIA: ICD-10-CM

## 2021-10-29 DIAGNOSIS — H02.831 DERMATOCHALASIS OF BOTH UPPER EYELIDS: ICD-10-CM

## 2021-10-29 DIAGNOSIS — H02.834 DERMATOCHALASIS OF BOTH UPPER EYELIDS: ICD-10-CM

## 2021-10-29 DIAGNOSIS — D23.10 PAPILLOMA OF RIGHT EYELID: Primary | ICD-10-CM

## 2021-10-29 PROCEDURE — 92004 PR EYE EXAM, NEW PATIENT,COMPREHESV: ICD-10-PCS | Mod: S$PBB,,, | Performed by: OPHTHALMOLOGY

## 2021-10-29 PROCEDURE — 99999 PR PBB SHADOW E&M-EST. PATIENT-LVL III: CPT | Mod: PBBFAC,,, | Performed by: OPHTHALMOLOGY

## 2021-10-29 PROCEDURE — 92004 COMPRE OPH EXAM NEW PT 1/>: CPT | Mod: S$PBB,,, | Performed by: OPHTHALMOLOGY

## 2021-10-29 PROCEDURE — 92133 POSTERIOR SEGMENT OCT OPTIC NERVE(OCULAR COHERENCE TOMOGRAPHY) - OU - BOTH EYES: ICD-10-PCS | Mod: 26,S$PBB,, | Performed by: OPHTHALMOLOGY

## 2021-10-29 PROCEDURE — 99213 OFFICE O/P EST LOW 20 MIN: CPT | Mod: PBBFAC,PO | Performed by: OPHTHALMOLOGY

## 2021-10-29 PROCEDURE — 99999 PR PBB SHADOW E&M-EST. PATIENT-LVL III: ICD-10-PCS | Mod: PBBFAC,,, | Performed by: OPHTHALMOLOGY

## 2021-10-29 PROCEDURE — 92133 CPTRZD OPH DX IMG PST SGM ON: CPT | Mod: PBBFAC,PO | Performed by: OPHTHALMOLOGY

## 2021-11-10 ENCOUNTER — HOSPITAL ENCOUNTER (OUTPATIENT)
Dept: RADIOLOGY | Facility: HOSPITAL | Age: 61
Discharge: HOME | End: 2021-11-10
Attending: PODIATRIST
Payer: MEDICARE

## 2021-11-10 DIAGNOSIS — M67.472 GANGLION CYST OF LEFT FOOT: ICD-10-CM

## 2021-11-10 PROCEDURE — 88304 TISSUE EXAM BY PATHOLOGIST: CPT | Mod: TC

## 2021-11-10 PROCEDURE — 76942 ECHO GUIDE FOR BIOPSY: CPT | Mod: TC

## 2022-02-10 ENCOUNTER — OFFICE VISIT (OUTPATIENT)
Dept: FAMILY MEDICINE | Facility: CLINIC | Age: 62
End: 2022-02-10
Payer: MEDICARE

## 2022-02-10 VITALS
HEIGHT: 69 IN | SYSTOLIC BLOOD PRESSURE: 156 MMHG | DIASTOLIC BLOOD PRESSURE: 96 MMHG | BODY MASS INDEX: 45.16 KG/M2 | TEMPERATURE: 100 F | HEART RATE: 104 BPM | RESPIRATION RATE: 14 BRPM | WEIGHT: 304.88 LBS | OXYGEN SATURATION: 96 %

## 2022-02-10 DIAGNOSIS — B96.89 ACUTE BACTERIAL RHINOSINUSITIS: ICD-10-CM

## 2022-02-10 DIAGNOSIS — R05.9 COUGH: ICD-10-CM

## 2022-02-10 DIAGNOSIS — I10 ESSENTIAL HYPERTENSION: ICD-10-CM

## 2022-02-10 DIAGNOSIS — R09.81 SINUS CONGESTION: ICD-10-CM

## 2022-02-10 DIAGNOSIS — J01.90 ACUTE BACTERIAL RHINOSINUSITIS: ICD-10-CM

## 2022-02-10 DIAGNOSIS — R73.03 PREDIABETES: Primary | ICD-10-CM

## 2022-02-10 PROCEDURE — 99999 PR PBB SHADOW E&M-EST. PATIENT-LVL IV: ICD-10-PCS | Mod: PBBFAC,,,

## 2022-02-10 PROCEDURE — 99999 PR PBB SHADOW E&M-EST. PATIENT-LVL IV: CPT | Mod: PBBFAC,,,

## 2022-02-10 PROCEDURE — 99214 OFFICE O/P EST MOD 30 MIN: CPT | Mod: S$PBB,,,

## 2022-02-10 PROCEDURE — 99214 OFFICE O/P EST MOD 30 MIN: CPT | Mod: PBBFAC,PO

## 2022-02-10 PROCEDURE — 99214 PR OFFICE/OUTPT VISIT, EST, LEVL IV, 30-39 MIN: ICD-10-PCS | Mod: S$PBB,,,

## 2022-02-10 RX ORDER — AZELASTINE 1 MG/ML
1 SPRAY, METERED NASAL 2 TIMES DAILY
Qty: 30 ML | Refills: 2 | Status: SHIPPED | OUTPATIENT
Start: 2022-02-10 | End: 2022-04-17 | Stop reason: SDUPTHER

## 2022-02-10 RX ORDER — HYDROCHLOROTHIAZIDE 12.5 MG/1
12.5 TABLET ORAL DAILY
Qty: 30 TABLET | Refills: 11 | Status: CANCELLED | OUTPATIENT
Start: 2022-02-10 | End: 2023-02-10

## 2022-02-10 RX ORDER — DOXYCYCLINE HYCLATE 100 MG
100 TABLET ORAL 2 TIMES DAILY
Qty: 14 TABLET | Refills: 0 | Status: SHIPPED | OUTPATIENT
Start: 2022-02-10 | End: 2022-02-17

## 2022-02-10 RX ORDER — BENZONATATE 100 MG/1
100 CAPSULE ORAL 3 TIMES DAILY PRN
Qty: 30 CAPSULE | Refills: 0 | Status: SHIPPED | OUTPATIENT
Start: 2022-02-10 | End: 2022-02-20

## 2022-02-10 RX ORDER — PROMETHAZINE HYDROCHLORIDE AND DEXTROMETHORPHAN HYDROBROMIDE 6.25; 15 MG/5ML; MG/5ML
5 SYRUP ORAL NIGHTLY PRN
Qty: 118 ML | Refills: 0 | Status: SHIPPED | OUTPATIENT
Start: 2022-02-10 | End: 2022-02-20

## 2022-02-10 NOTE — PROGRESS NOTES
Subjective:       Patient ID: Elliott Ayala is a 61 y.o. male.    Chief Complaint: Cough, Nasal Congestion, and Sore Throat    Elliott Ayala is a 62 yo M pt w/ MHx listed below that presents to the clinic w/ complaints of cough sinus congestion and sore throat for over a week. He explains that he chronically has issues with his sinuses and allergies. He has been taking OTC medications with minimal relief. He has associated fevers and headaches. He states that he does not wish to be tested for COVID because he does not believe he is experiencing COVID. He denies feeling short of breath or chest pain.       Past Medical History:   Diagnosis Date    Allergy        Review of patient's allergies indicates:   Allergen Reactions    Codeine     Penicillins          Current Outpatient Medications:     levothyroxine (SYNTHROID) 75 MCG tablet, Take 1 tablet (75 mcg total) by mouth before breakfast., Disp: 30 tablet, Rfl: 11    meclizine (ANTIVERT) 25 mg tablet, Take 1 tablet (25 mg total) by mouth 3 (three) times daily as needed., Disp: 30 tablet, Rfl: 0    oxymetazoline (AFRIN, OXYMETAZOLINE,) 0.05 % Mist, by Nasal route daily as needed., Disp: , Rfl:     azelastine (ASTELIN) 137 mcg (0.1 %) nasal spray, 1 spray (137 mcg total) by Nasal route 2 (two) times daily., Disp: 30 mL, Rfl: 2    benzonatate (TESSALON) 100 MG capsule, Take 1 capsule (100 mg total) by mouth 3 (three) times daily as needed for Cough., Disp: 30 capsule, Rfl: 0    diclofenac sodium (VOLTAREN) 1 % Gel, Apply 4 g topically 4 (four) times daily as needed. (Patient not taking: Reported on 2/10/2022), Disp: 100 g, Rfl: 0    doxycycline (VIBRA-TABS) 100 MG tablet, Take 1 tablet (100 mg total) by mouth 2 (two) times daily. for 7 days, Disp: 14 tablet, Rfl: 0    promethazine-dextromethorphan (PROMETHAZINE-DM) 6.25-15 mg/5 mL Syrp, Take 5 mLs by mouth nightly as needed (cough)., Disp: 118 mL, Rfl: 0    Review of Systems   Constitutional: Positive for  "fever. Negative for activity change, appetite change, chills, diaphoresis, fatigue and unexpected weight change.   HENT: Positive for congestion, postnasal drip, rhinorrhea, sinus pressure and sore throat. Negative for ear pain, sneezing and trouble swallowing.    Eyes: Negative for pain, itching and visual disturbance.   Respiratory: Positive for cough. Negative for chest tightness, shortness of breath and wheezing.    Cardiovascular: Negative for chest pain, palpitations and leg swelling.   Gastrointestinal: Negative for abdominal distention, abdominal pain, blood in stool, constipation, diarrhea, nausea and vomiting.   Endocrine: Negative for cold intolerance and heat intolerance.   Genitourinary: Negative for difficulty urinating, dysuria, frequency, hematuria and urgency.   Musculoskeletal: Negative for arthralgias, back pain, myalgias and neck pain.   Skin: Negative for color change, pallor, rash and wound.   Neurological: Positive for headaches. Negative for dizziness, syncope, weakness and numbness.   Hematological: Negative for adenopathy.   Psychiatric/Behavioral: Negative for behavioral problems. The patient is not nervous/anxious.        Objective:      BP (!) 156/96 (BP Location: Right arm, Patient Position: Sitting, BP Method: Large (Manual))   Pulse 104   Temp 99.7 °F (37.6 °C) (Oral)   Resp 14   Ht 5' 9" (1.753 m)   Wt (!) 138.3 kg (304 lb 14.3 oz)   SpO2 96%   BMI 45.03 kg/m²   Physical Exam  Vitals reviewed.   Constitutional:       General: He is not in acute distress.     Appearance: Normal appearance. He is obese. He is not ill-appearing, toxic-appearing or diaphoretic.   HENT:      Head: Normocephalic.      Right Ear: External ear normal.      Left Ear: External ear normal.      Nose: Congestion and rhinorrhea present.      Mouth/Throat:      Mouth: Mucous membranes are moist.      Pharynx: Oropharynx is clear. Posterior oropharyngeal erythema present.   Eyes:      General: No scleral " icterus.        Right eye: No discharge.         Left eye: No discharge.      Extraocular Movements: Extraocular movements intact.      Conjunctiva/sclera: Conjunctivae normal.   Cardiovascular:      Rate and Rhythm: Regular rhythm. Tachycardia present.      Pulses: Normal pulses.      Heart sounds: Normal heart sounds. No murmur heard.  No friction rub. No gallop.    Pulmonary:      Effort: Pulmonary effort is normal. No respiratory distress.      Breath sounds: Normal breath sounds. No wheezing, rhonchi or rales.   Chest:      Chest wall: No tenderness.   Abdominal:      General: Bowel sounds are normal. There is no distension.      Palpations: Abdomen is soft. There is no mass.      Tenderness: There is no abdominal tenderness. There is no guarding.   Musculoskeletal:         General: No swelling, tenderness or deformity. Normal range of motion.      Cervical back: Normal range of motion.      Right lower leg: No edema.      Left lower leg: No edema.   Skin:     General: Skin is warm and dry.      Capillary Refill: Capillary refill takes less than 2 seconds.      Coloration: Skin is not jaundiced.      Findings: No bruising, erythema, lesion or rash.   Neurological:      Mental Status: He is alert and oriented to person, place, and time.      Gait: Gait normal.         Assessment:       1. Prediabetes    2. Acute bacterial rhinosinusitis    3. Cough    4. Sinus congestion    5. Essential hypertension        Plan:       Prediabetes        -    Avoided use of steroid therapy. Recommend following a healthy diet and exercising regularly.     Acute bacterial rhinosinusitis  -     doxycycline (VIBRA-TABS) 100 MG tablet; Take 1 tablet (100 mg total) by mouth 2 (two) times daily. for 7 days  Dispense: 14 tablet; Refill: 0    Cough  -     promethazine-dextromethorphan (PROMETHAZINE-DM) 6.25-15 mg/5 mL Syrp; Take 5 mLs by mouth nightly as needed (cough).  Dispense: 118 mL; Refill: 0  -     benzonatate (TESSALON) 100 MG  capsule; Take 1 capsule (100 mg total) by mouth 3 (three) times daily as needed for Cough.  Dispense: 30 capsule; Refill: 0    Sinus congestion  -     azelastine (ASTELIN) 137 mcg (0.1 %) nasal spray; 1 spray (137 mcg total) by Nasal route 2 (two) times daily.  Dispense: 30 mL; Refill: 2    Essential hypertension        -    Elevated on presentation today. Possibly due to feeling ill. Patient refuses starting of tx for this problem today.       Pt refused COVID test. Pt to follow up w/ his PCP routinely.          Colin Ramirez PA-C  Family Medicine Physician Assistant       I spent a total of 20 minutes on the day of the visit.This includes face to face time and non-face to face time preparing to see the patient (eg, review of tests), obtaining and/or reviewing separately obtained history, documenting clinical information in the electronic or other health record, independently interpreting results and communicating results to the patient/family/caregiver, or care coordinator.      We have addressed [4] Moderate: 1 or more chronic illnesses with exacerbation, progression, or side effects of treatment / 2 or more stable chronic illnesses / 1 undiagnosed new problem with uncertain prognosis / 1 acute illness with systemic symptoms / 1 acute complicated injury  The complexity of the data reviewed and analyzed for this visit was [3] Limited (Reviewed prior external note, ordered unique testing or reviewed the results of each unique test)   The risk of complications and/or morbidity or mortality are [4] Moderate risk (I.e. prescription drug management / decision regarding minor surgery with identified pt or procedure risk factors / decision regarding elective major surgery without identified pt or procedure risk factors / diagnosis or treatment significantly limited by social determinants of health)   The level of Medical Decision Making for this visit is [4] Moderate

## 2022-02-10 NOTE — PATIENT INSTRUCTIONS
If you are due for any health screening(s) below please notify me so we can arrange them to be ordered and scheduled to maintain your health.     Health Maintenance   Topic Date Due    TETANUS VACCINE  Never done    Lipid Panel  06/08/2026    Hepatitis C Screening  Completed          Colon Cancer Screening    Of cancers affecting both men and women, colorectal cancer is the third leading cancer killer in the [place]. But it doesnt have to be. Screening can prevent colorectal cancer or find it at an early stage when treatment often leads to a cure.    A colonoscopy is the preferred test for detecting colon cancer. It is needed only once every 10 years if results are negative. While sedated, a flexible, lighted tube with a tiny camera is inserted into the rectum and advanced through the colon to look for cancers. An alternative screening test that is used at home and returned to the lab may also be used. It detects hidden blood in bowel movements which could indicate cancer in the colon. If results are positive, you will need a colonoscopy to determine if the blood is a sign of cancer. This type of follow up (diagnostic) colonoscopy usually requires additional copays as required by your insurance provider. Please contact your PCP if you have any questions.    Although you are still overdue for this important screening, due to the COVID-19 pandemic, we recommend scheduling it in the near future.

## 2022-03-30 ENCOUNTER — TELEPHONE (OUTPATIENT)
Dept: FAMILY MEDICINE | Facility: CLINIC | Age: 62
End: 2022-03-30

## 2022-03-30 ENCOUNTER — OFFICE VISIT (OUTPATIENT)
Dept: FAMILY MEDICINE | Facility: CLINIC | Age: 62
End: 2022-03-30
Payer: MEDICARE

## 2022-03-30 VITALS
TEMPERATURE: 99 F | BODY MASS INDEX: 44.4 KG/M2 | SYSTOLIC BLOOD PRESSURE: 132 MMHG | HEIGHT: 69 IN | OXYGEN SATURATION: 98 % | WEIGHT: 299.81 LBS | HEART RATE: 93 BPM | RESPIRATION RATE: 16 BRPM | DIASTOLIC BLOOD PRESSURE: 78 MMHG

## 2022-03-30 DIAGNOSIS — R73.03 PREDIABETES: ICD-10-CM

## 2022-03-30 DIAGNOSIS — M65.4 DE QUERVAIN'S DISEASE (TENOSYNOVITIS): ICD-10-CM

## 2022-03-30 DIAGNOSIS — E66.01 MORBID OBESITY WITH BMI OF 40.0-44.9, ADULT: ICD-10-CM

## 2022-03-30 DIAGNOSIS — H81.10 BENIGN PAROXYSMAL VERTIGO, UNSPECIFIED LATERALITY: ICD-10-CM

## 2022-03-30 DIAGNOSIS — Z12.11 COLON CANCER SCREENING: ICD-10-CM

## 2022-03-30 DIAGNOSIS — Z12.5 PROSTATE CANCER SCREENING: ICD-10-CM

## 2022-03-30 DIAGNOSIS — N52.9 ERECTILE DYSFUNCTION, UNSPECIFIED ERECTILE DYSFUNCTION TYPE: Primary | ICD-10-CM

## 2022-03-30 DIAGNOSIS — R22.1 LOCALIZED SWELLING, MASS AND LUMP, NECK: ICD-10-CM

## 2022-03-30 PROCEDURE — 99214 PR OFFICE/OUTPT VISIT, EST, LEVL IV, 30-39 MIN: ICD-10-PCS | Mod: S$PBB,,,

## 2022-03-30 PROCEDURE — 99215 OFFICE O/P EST HI 40 MIN: CPT | Mod: PBBFAC,PO

## 2022-03-30 PROCEDURE — 99999 PR PBB SHADOW E&M-EST. PATIENT-LVL V: CPT | Mod: PBBFAC,,,

## 2022-03-30 PROCEDURE — 99999 PR PBB SHADOW E&M-EST. PATIENT-LVL V: ICD-10-PCS | Mod: PBBFAC,,,

## 2022-03-30 PROCEDURE — 99214 OFFICE O/P EST MOD 30 MIN: CPT | Mod: S$PBB,,,

## 2022-03-30 RX ORDER — MECLIZINE HYDROCHLORIDE 25 MG/1
25 TABLET ORAL 3 TIMES DAILY PRN
Qty: 30 TABLET | Refills: 2 | Status: SHIPPED | OUTPATIENT
Start: 2022-03-30 | End: 2023-11-21 | Stop reason: SDUPTHER

## 2022-03-30 RX ORDER — IBUPROFEN 600 MG/1
600 TABLET ORAL 3 TIMES DAILY PRN
Qty: 30 TABLET | Refills: 0 | Status: SHIPPED | OUTPATIENT
Start: 2022-03-30 | End: 2023-01-31

## 2022-03-30 RX ORDER — TADALAFIL 10 MG/1
10 TABLET ORAL DAILY PRN
Qty: 30 TABLET | Refills: 2 | Status: SHIPPED | OUTPATIENT
Start: 2022-03-30 | End: 2023-03-30

## 2022-03-30 NOTE — PROGRESS NOTES
Subjective:       Patient ID: Elliott Ayala is a 61 y.o. male.    Chief Complaint: Wrist Pain (Swelling ) and Numbness    Elliott Ayala is a 62 yo M pt w/ Mhx listed below that presents to the clinic w/ complaints of bilateral wrist pain. He first noticed his symptoms after playing football with his young cousin. When he was trying to grab the ball and throw it he noticed that he had pain that started at his wrist and radiated to his thumb. Since then he has noticed gradual worsening of his symptoms. He admits to frequently using his thumbs for daily tasks. His symptoms are exacerbated with activity. He has not tried anything in particular for this problem.       Past Medical History:   Diagnosis Date    Allergy        Review of patient's allergies indicates:   Allergen Reactions    Codeine     Penicillins          Current Outpatient Medications:     azelastine (ASTELIN) 137 mcg (0.1 %) nasal spray, 1 spray (137 mcg total) by Nasal route 2 (two) times daily., Disp: 30 mL, Rfl: 2    levothyroxine (SYNTHROID) 75 MCG tablet, Take 1 tablet (75 mcg total) by mouth before breakfast., Disp: 30 tablet, Rfl: 11    diclofenac sodium (VOLTAREN) 1 % Gel, Apply 4 g topically 4 (four) times daily as needed. (Patient not taking: Reported on 2/10/2022), Disp: 100 g, Rfl: 0    meclizine (ANTIVERT) 25 mg tablet, Take 1 tablet (25 mg total) by mouth 3 (three) times daily as needed for Dizziness., Disp: 30 tablet, Rfl: 2    oxymetazoline (AFRIN, OXYMETAZOLINE,) 0.05 % Mist, by Nasal route daily as needed., Disp: , Rfl:     tadalafiL (CIALIS) 10 MG tablet, Take 1 tablet (10 mg total) by mouth daily as needed for Erectile Dysfunction., Disp: 30 tablet, Rfl: 2    Review of Systems   Constitutional: Positive for activity change. Negative for appetite change, chills, diaphoresis, fatigue, fever and unexpected weight change.   HENT: Negative for congestion, ear pain, postnasal drip, rhinorrhea, sinus pressure, sneezing, sore throat  "and trouble swallowing.    Eyes: Negative for pain, itching and visual disturbance.   Respiratory: Negative for cough, chest tightness, shortness of breath and wheezing.    Cardiovascular: Negative for chest pain, palpitations and leg swelling.   Gastrointestinal: Negative for abdominal distention, abdominal pain, blood in stool, constipation, diarrhea, nausea and vomiting.   Endocrine: Negative for cold intolerance and heat intolerance.   Genitourinary: Negative for difficulty urinating, dysuria, frequency, hematuria and urgency.   Musculoskeletal: Positive for arthralgias. Negative for back pain, myalgias and neck pain.   Skin: Negative for color change, pallor, rash and wound.   Neurological: Negative for dizziness, syncope, weakness, numbness and headaches.   Hematological: Negative for adenopathy.   Psychiatric/Behavioral: Negative for behavioral problems. The patient is not nervous/anxious.        Objective:      /78 (BP Location: Right arm, Patient Position: Sitting, BP Method: Large (Manual))   Pulse 93   Temp 98.6 °F (37 °C) (Oral)   Resp 16   Ht 5' 9" (1.753 m)   Wt 136 kg (299 lb 13.2 oz)   SpO2 98%   BMI 44.28 kg/m²   Physical Exam  Vitals reviewed.   Constitutional:       General: He is not in acute distress.     Appearance: Normal appearance. He is obese. He is not ill-appearing, toxic-appearing or diaphoretic.   HENT:      Head: Normocephalic.      Right Ear: External ear normal.      Left Ear: External ear normal.      Nose: Nose normal. No congestion or rhinorrhea.      Mouth/Throat:      Mouth: Mucous membranes are moist.      Pharynx: Oropharynx is clear.   Eyes:      General: No scleral icterus.        Right eye: No discharge.         Left eye: No discharge.      Extraocular Movements: Extraocular movements intact.      Conjunctiva/sclera: Conjunctivae normal.   Cardiovascular:      Rate and Rhythm: Normal rate and regular rhythm.      Pulses: Normal pulses.      Heart sounds: Normal " heart sounds. No murmur heard.    No friction rub. No gallop.   Pulmonary:      Effort: Pulmonary effort is normal. No respiratory distress.      Breath sounds: Normal breath sounds. No wheezing, rhonchi or rales.   Chest:      Chest wall: No tenderness.   Abdominal:      General: There is no distension.      Palpations: Abdomen is soft. There is no mass.      Tenderness: There is no abdominal tenderness. There is no guarding.   Musculoskeletal:         General: Swelling and tenderness present. No deformity or signs of injury. Normal range of motion.      Cervical back: Normal range of motion.      Right lower leg: No edema.      Left lower leg: No edema.      Comments: + Finkelstein   Skin:     General: Skin is warm and dry.      Capillary Refill: Capillary refill takes less than 2 seconds.      Coloration: Skin is not jaundiced.      Findings: No bruising, erythema, lesion or rash.   Neurological:      Mental Status: He is alert and oriented to person, place, and time.      Gait: Gait normal.   Psychiatric:         Mood and Affect: Mood normal.         Behavior: Behavior normal.         Thought Content: Thought content normal.         Judgment: Judgment normal.         Assessment:       1. Erectile dysfunction, unspecified erectile dysfunction type    2. Benign paroxysmal vertigo, unspecified laterality     3. De Quervain's disease (tenosynovitis)    4. Colon cancer screening    5. Morbid obesity with BMI of 40.0-44.9, adult    6. Prostate cancer screening    7. Prediabetes    8. Localized swelling, mass and lump, neck        Plan:       Erectile dysfunction, unspecified erectile dysfunction type  -     tadalafiL (CIALIS) 10 MG tablet; Take 1 tablet (10 mg total) by mouth daily as needed for Erectile Dysfunction.  Dispense: 30 tablet; Refill: 2    Benign paroxysmal vertigo, unspecified laterality   -     meclizine (ANTIVERT) 25 mg tablet; Take 1 tablet (25 mg total) by mouth 3 (three) times daily as needed for  Dizziness.  Dispense: 30 tablet; Refill: 2    De Quervain's disease (tenosynovitis)  -     Ambulatory referral/consult to Orthopedics; Future; Expected date: 04/06/2022  -     ibuprofen (ADVIL,MOTRIN) 600 MG tablet; Take 1 tablet (600 mg total) by mouth 3 (three) times daily as needed for Pain.  Dispense: 30 tablet; Refill: 0        Colon cancer screening  -     Ambulatory referral/consult to Gastroenterology; Future; Expected date: 04/06/2022    Morbid obesity with BMI of 40.0-44.9, adult  -     Comprehensive Metabolic Panel; Future; Expected date: 06/30/2022  -     Hemoglobin A1C; Future; Expected date: 06/30/2022  -     Lipid Panel; Future; Expected date: 06/30/2022  -     CBC Auto Differential; Future; Expected date: 06/30/2022    Prostate cancer screening  -     PSA, Screening; Future; Expected date: 06/30/2022    Prediabetes  -     Hemoglobin A1C; Future; Expected date: 06/30/2022    Localized swelling, mass and lump, neck  -     US Soft Tissue Head Neck Thyroid; Future; Expected date: 03/30/2022          Patient to follow up w/ Dr. Spring to establish care in 3 months. Fasting labs prior.            Colin Ramirez PA-C  Family Medicine Physician Assistant       I spent a total of 30 minutes on the day of the visit.This includes face to face time and non-face to face time preparing to see the patient (eg, review of tests), obtaining and/or reviewing separately obtained history, documenting clinical information in the electronic or other health record, independently interpreting results and communicating results to the patient/family/caregiver, or care coordinator.      We have addressed [4] Moderate: 1 or more chronic illnesses with exacerbation, progression, or side effects of treatment / 2 or more stable chronic illnesses / 1 undiagnosed new problem with uncertain prognosis / 1 acute illness with systemic symptoms / 1 acute complicated injury  The complexity of the data reviewed and analyzed for this  visit was [3] Limited (Reviewed prior external note, ordered unique testing or reviewed the results of each unique test)   The risk of complications and/or morbidity or mortality are [4] Moderate risk (I.e. prescription drug management / decision regarding minor surgery with identified pt or procedure risk factors / decision regarding elective major surgery without identified pt or procedure risk factors / diagnosis or treatment significantly limited by social determinants of health)   The level of Medical Decision Making for this visit is [4] Moderate

## 2022-03-31 ENCOUNTER — TELEPHONE (OUTPATIENT)
Dept: FAMILY MEDICINE | Facility: CLINIC | Age: 62
End: 2022-03-31
Payer: MEDICARE

## 2022-04-01 ENCOUNTER — OFFICE VISIT (OUTPATIENT)
Dept: SPORTS MEDICINE | Facility: CLINIC | Age: 62
End: 2022-04-01
Payer: MEDICARE

## 2022-04-01 VITALS
OXYGEN SATURATION: 94 % | WEIGHT: 299.81 LBS | DIASTOLIC BLOOD PRESSURE: 76 MMHG | HEIGHT: 69 IN | HEART RATE: 88 BPM | BODY MASS INDEX: 44.4 KG/M2 | SYSTOLIC BLOOD PRESSURE: 124 MMHG

## 2022-04-01 DIAGNOSIS — M65.4 DE QUERVAIN'S DISEASE (TENOSYNOVITIS): Primary | ICD-10-CM

## 2022-04-01 DIAGNOSIS — M25.532 BILATERAL WRIST PAIN: ICD-10-CM

## 2022-04-01 DIAGNOSIS — M25.531 BILATERAL WRIST PAIN: ICD-10-CM

## 2022-04-01 PROCEDURE — 99999 PR PBB SHADOW E&M-EST. PATIENT-LVL III: CPT | Mod: PBBFAC,,, | Performed by: FAMILY MEDICINE

## 2022-04-01 PROCEDURE — 99999 PR PBB SHADOW E&M-EST. PATIENT-LVL III: ICD-10-PCS | Mod: PBBFAC,,, | Performed by: FAMILY MEDICINE

## 2022-04-01 PROCEDURE — 99213 OFFICE O/P EST LOW 20 MIN: CPT | Mod: S$PBB,,, | Performed by: FAMILY MEDICINE

## 2022-04-01 PROCEDURE — 99213 PR OFFICE/OUTPT VISIT, EST, LEVL III, 20-29 MIN: ICD-10-PCS | Mod: S$PBB,,, | Performed by: FAMILY MEDICINE

## 2022-04-01 PROCEDURE — 99213 OFFICE O/P EST LOW 20 MIN: CPT | Mod: PBBFAC,PO | Performed by: FAMILY MEDICINE

## 2022-04-01 NOTE — PROGRESS NOTES
Subjective:          Chief Complaint: Elliott Ayala is a 61 y.o. male who had no chief complaint listed for this encounter.    Pt is a 61 year old male here today for evaluation of bilateral wrist pain. Worse on left. Has been present for a week. No known injury. Localizes pain over radial styloids on both sides. Made worse by any movement of the thumbs. No swelling noted. He has tried ibuprofen with no relief.       Review of Systems   Constitutional: Negative for chills and decreased appetite.   HENT: Negative for congestion and sore throat.    Eyes: Negative for blurred vision.   Cardiovascular: Negative for chest pain, dyspnea on exertion and palpitations.   Respiratory: Negative for cough and shortness of breath.    Skin: Negative for rash.   Neurological: Negative for difficulty with concentration, disturbances in coordination and headaches.   Psychiatric/Behavioral: Negative for altered mental status, depression, hallucinations, memory loss and suicidal ideas.                   Objective:        General: Elliott is well-developed, well-nourished, appears stated age, in no acute distress, alert and oriented to time, place and person.     General    Nursing note and vitals reviewed.  Constitutional: He is oriented to person, place, and time. He appears well-developed and well-nourished.   HENT:   Nose: Nose normal.   Eyes: EOM are normal. Pupils are equal, round, and reactive to light.   Neck: Neck supple.   Cardiovascular: Normal rate.    Pulmonary/Chest: Effort normal.   Abdominal: Soft.   Neurological: He is alert and oriented to person, place, and time. He has normal reflexes.   Psychiatric: He has a normal mood and affect. His behavior is normal. Judgment and thought content normal.             Right Hand/Wrist Exam     Inspection   Effusion: Wrist - present     Pain   Wrist - The patient exhibits pain of the flexor/pronator group.    Swelling   Wrist - The patient is swollen on the flexor/pronator  group.    Tenderness   The patient is tender to palpation of the radial area.    Range of Motion   The patient has normal right hand/wrist ROM.    Tests   Tinel's sign (median nerve): negative  Finkelstein's test: positive      Other     Neuorologic Exam    Median Distribution: normal  Ulnar Distribution: normal  Radial Distribution: normal      Left Hand/Wrist Exam     Inspection   Effusion: Wrist - present     Pain   Wrist - The patient exhibits pain of the flexor/pronator group.    Tenderness   The patient is tender to palpation of the radial area.     Range of Motion   The patient has normal left hand/wrist ROM.    Tests   Tinel's sign (median nerve): negative  Finkelstein's test: positive      Other     Sensory Exam  Median Distribution: normal  Ulnar Distribution: normal  Radial Distribution: normal          Muscle Strength   Right Upper Extremity   Wrist extension: 5/5   Wrist flexion: 5/5   : 5/5   Pinch Mechanism: 5/5  Intrinsics: 5/5  EPL (Extensor Pollicis Longus): 5/5  Left Upper Extremity  Wrist extension: 5/5   Wrist flexion: 5/5   :  5/5   Pinch Mechanism: 5/5  Intrinsics: 5/5  EPL (Extensor Pollicis Longus): 5/5    Vascular Exam     Right Pulses      Radial:                    2+      Left Pulses      Radial:                    2+                  Assessment:       Encounter Diagnoses   Name Primary?    De Quervain's disease (tenosynovitis) Yes    Bilateral wrist pain           Plan:       Discussed treatment options with patient including rest, heat, ice, anti-inflammatories both over-the-counter prescription, bracing, physical therapy both formal and at home, injection therapy with corticosteroids and biological agents, and surgical options.  Patient elected for bilateral thumb brace splints and continued use of nsaids. Initially, pt agreed to bilateral injections for De Quervains, but changed his mind after reading consent form and feeling reservations after reading potential risks.                      Patient questionnaires may have been collected.

## 2022-04-05 ENCOUNTER — HOSPITAL ENCOUNTER (OUTPATIENT)
Dept: RADIOLOGY | Facility: HOSPITAL | Age: 62
Discharge: HOME | End: 2022-04-05
Payer: MEDICARE

## 2022-04-05 DIAGNOSIS — R22.1 LOCALIZED SWELLING, MASS AND LUMP, NECK: ICD-10-CM

## 2022-04-05 DIAGNOSIS — R22.1 LOCALIZED SWELLING, MASS AND LUMP, NECK: Primary | ICD-10-CM

## 2022-04-05 PROCEDURE — 76536 US EXAM OF HEAD AND NECK: CPT | Mod: 26,,, | Performed by: RADIOLOGY

## 2022-04-05 PROCEDURE — 76536 US EXAM OF HEAD AND NECK: CPT | Mod: TC

## 2022-04-05 PROCEDURE — 76536 US SOFT TISSUE HEAD NECK THYROID: ICD-10-PCS | Mod: 26,,, | Performed by: RADIOLOGY

## 2022-04-08 ENCOUNTER — HOSPITAL ENCOUNTER (OUTPATIENT)
Dept: RADIOLOGY | Facility: HOSPITAL | Age: 62
Discharge: HOME | End: 2022-04-08
Payer: MEDICARE

## 2022-04-08 DIAGNOSIS — R22.1 LOCALIZED SWELLING, MASS AND LUMP, NECK: ICD-10-CM

## 2022-04-08 DIAGNOSIS — R22.1 LOCALIZED SWELLING, MASS AND LUMP, NECK: Primary | ICD-10-CM

## 2022-04-08 PROCEDURE — 25500020 PHARM REV CODE 255

## 2022-04-08 PROCEDURE — 70491 CT SOFT TISSUE NECK WITH CONTRAST: ICD-10-PCS | Mod: 26,,, | Performed by: RADIOLOGY

## 2022-04-08 PROCEDURE — 70491 CT SOFT TISSUE NECK W/DYE: CPT | Mod: TC

## 2022-04-08 PROCEDURE — 70491 CT SOFT TISSUE NECK W/DYE: CPT | Mod: 26,,, | Performed by: RADIOLOGY

## 2022-04-08 RX ADMIN — IOHEXOL 75 ML: 350 INJECTION, SOLUTION INTRAVENOUS at 02:04

## 2022-04-27 ENCOUNTER — OFFICE VISIT (OUTPATIENT)
Dept: OTOLARYNGOLOGY | Facility: CLINIC | Age: 62
End: 2022-04-27
Payer: MEDICARE

## 2022-04-27 VITALS — HEIGHT: 69 IN | WEIGHT: 315 LBS | TEMPERATURE: 99 F | BODY MASS INDEX: 46.65 KG/M2

## 2022-04-27 DIAGNOSIS — L82.1 SEBORRHEIC KERATOSIS: ICD-10-CM

## 2022-04-27 DIAGNOSIS — D37.030 NEOPLASM OF UNCERTAIN BEHAVIOR OF PAROTID SALIVARY GLAND: Primary | ICD-10-CM

## 2022-04-27 PROCEDURE — 88173 CYTOPATH EVAL FNA REPORT: CPT | Performed by: STUDENT IN AN ORGANIZED HEALTH CARE EDUCATION/TRAINING PROGRAM

## 2022-04-27 PROCEDURE — 99204 PR OFFICE/OUTPT VISIT, NEW, LEVL IV, 45-59 MIN: ICD-10-PCS | Mod: 25,S$PBB,, | Performed by: OTOLARYNGOLOGY

## 2022-04-27 PROCEDURE — 10021 PR FINE NEEDLE ASP BIOPSY, W/O IMAGING GUIDANCE, 1ST LESION: ICD-10-PCS | Mod: S$PBB,,, | Performed by: OTOLARYNGOLOGY

## 2022-04-27 PROCEDURE — 99999 PR PBB SHADOW E&M-EST. PATIENT-LVL III: ICD-10-PCS | Mod: PBBFAC,,, | Performed by: OTOLARYNGOLOGY

## 2022-04-27 PROCEDURE — 88173 PR  INTERPRETATION OF FNA SMEAR: ICD-10-PCS | Mod: 26,,, | Performed by: STUDENT IN AN ORGANIZED HEALTH CARE EDUCATION/TRAINING PROGRAM

## 2022-04-27 PROCEDURE — 99999 PR PBB SHADOW E&M-EST. PATIENT-LVL III: CPT | Mod: PBBFAC,,, | Performed by: OTOLARYNGOLOGY

## 2022-04-27 PROCEDURE — 99213 OFFICE O/P EST LOW 20 MIN: CPT | Mod: PBBFAC,PO,25 | Performed by: OTOLARYNGOLOGY

## 2022-04-27 PROCEDURE — 10021 FNA BX W/O IMG GDN 1ST LES: CPT | Mod: S$PBB,,, | Performed by: OTOLARYNGOLOGY

## 2022-04-27 PROCEDURE — 99204 OFFICE O/P NEW MOD 45 MIN: CPT | Mod: 25,S$PBB,, | Performed by: OTOLARYNGOLOGY

## 2022-04-27 PROCEDURE — 88173 CYTOPATH EVAL FNA REPORT: CPT | Mod: 26,,, | Performed by: STUDENT IN AN ORGANIZED HEALTH CARE EDUCATION/TRAINING PROGRAM

## 2022-04-27 NOTE — PROGRESS NOTES
[facility]    Subjective:      Patient: Elliott Ayala Patient PCP: Primary Doctor No         :  1960     Sex:  male      MRN:  [id number]          Date of Visit: 2022      Chief Complaint: Mass (Noticed a lump on the left side of neck under jaw/ear about a month ago while shaving. Not painful, has not noticed any size difference since first discovery of lump.)      Patient ID: Elliott Ayala is a 62 y.o. male former smoker with hypothyroidism referred to me by Colin Ramirez in consultation for left upper neck mass. Noted incidentally about a month ago.  No skin cancer history or suspicious lesion.  Has had a lesion growing on his left cheek for the last few years which has never been ulcerated or bothersome.  No dental issues.  No sudden growth, pain or facial palsy.  Ultrasound of the neck performed 2022 showed a 3.8 x 1.5 x 2.4 cm oblong hypoechoic mass.  CT scan soft tissue neck with IV contrast completed 2022 reviewed images today shows an 8.4 mm deep portion of the superficial lobe right parotid round mass and a much larger posterior inferior parotid mass of maximum AP dimension of 25 mm which appears to be superiorly bilobed but no associated suspicious adenopathy.    Review of Systems   Constitutional: Negative.    HENT: Negative.    Eyes: Negative.    Respiratory: Negative.    Cardiovascular: Negative.    Gastrointestinal: Negative.    Endocrine: Negative.    Genitourinary: Negative.    Musculoskeletal: Negative.    Skin: Negative.    Allergic/Immunologic: Negative.    Neurological: Negative.    Hematological: Negative.    Psychiatric/Behavioral: Negative.    All other systems reviewed and are negative.       Past Medical History  He has a past medical history of Allergy.    Family / Surgical / Social History  His family history includes Diabetes in his maternal aunt, maternal uncle, and mother; Heart disease in his father.    Past Surgical History:   Procedure Laterality Date     ACHILLES TENDON SURGERY         Social History     Tobacco Use    Smoking status: Former Smoker    Smokeless tobacco: Never Used    Tobacco comment: quit over 10 years ago   Substance and Sexual Activity    Alcohol use: Yes     Comment: social     Drug use: Yes     Types: Marijuana    Sexual activity: Yes       Medications  He has a current medication list which includes the following prescription(s): azelastine, ibuprofen, levothyroxine, meclizine, and tadalafil.      Allergies  Review of patient's allergies indicates:   Allergen Reactions    Codeine     Penicillins        All medications, allergies, and past history have been reviewed.    Objective:      Vitals:  Vitals - 1 value per visit 4/1/2022 4/27/2022 4/27/2022   SYSTOLIC 124 - -   DIASTOLIC 76 - -   Pulse 88 - -   Temp - - 99.1   Resp - - -   SPO2 94 - -   Weight (lb) 299.83 - 328.93   Weight (kg) 136 - 149.2   Height 69 - 69   BMI (Calculated) 44.3 - 48.6   VISIT REPORT - - -   Pain Score  - 0 -       Body surface area is 2.7 meters squared.    Physical Exam:    GENERAL  APPEARANCE -  alert, appears stated age, cooperative and moderately obese  BARRIER(S) TO COMMUNICATION -  none VOICE - appropriate for age and gender    INTEGUMENTARY  no suspicious head and neck lesions    HEENT  HEAD: Normocephalic, without obvious abnormality, atraumatic  FACE: INSPECTION - Symmetric, HB VI facial movement, no signs of trauma, no suspicious lesion(s)  PALPATION -  No masses SALIVARY GLANDS - 3 cm long tail of parotid posterior mass/fullness mobile nontender normal overlying skin, right parotid normal no appreciable mass, left salivary output through Stensen's papilla is normal no intraoral lesions or mass.  STRENGTH - facial symmetry  NECK/THYROID: normal atraumatic, no neck masses, normal thyroid, no jvd    EYES  Normal occular alignment and mobility with no visible nystagmus at rest    EARS/NOSE/MOUTH/THROAT  EARS  PINNAE AND EXTERNAL EARS - no suspicious  lesion OTOSCOPIC EXAM (surgical microscopy was not used for visualization/instrumentation): EAR EXAM - Normal ear canals, tympanic membranes and mobility, and middle ear spaces bilaterally.  HEARING - grossly intact to voice/finger rub    NOSE AND SINUSES  EXTERNAL NOSE - Grossly normal for age/sex  SEPTUM - normal/no obstruction on anterior exam without decongestion TURBINATES - within normal limits MUCOSA - within normal limits     MOUTH AND THROAT   ORAL CAVITY, LIPS, TEETH, GUMS & TONGUE - moist, no suspicious lesions multiple carious and fractured teeth no active infection or lesion.  OROPHARYNX /TONSILS/PHARYNGEAL WALLS/HYPOPHARYNX - no erythema or exudates  NASOPHARYNX - limited mirror exam - unable to visualize due to anatomy/gag  LARYNX -  - limited mirror exam - unable to visualize due to anatomy/gag      CHEST AND LUNG   INSPECTION & AUSCULTATION - normal effort, no stridor    CARDIOVASCULAR  AUSCULTATION & PERIPHERAL VASCULAR - regular rate and rhythm.    NEUROLOGIC  MENTAL STATUS - alert, interactive CRANIAL NERVES - normal    LYMPHATIC  HEAD AND NECK - non-palpable; SUPRACLAVICULAR - deferred; AXILLARY - deferred; INGUINAL - deferred; LIVER/SPLEEN - deferred        Procedure(s):  FNA see note    Labs:  WBC   Date Value Ref Range Status   06/08/2021 6.68 3.90 - 12.70 K/uL Final     Hemoglobin   Date Value Ref Range Status   06/08/2021 13.8 (L) 14.0 - 18.0 g/dL Final     Platelets   Date Value Ref Range Status   06/08/2021 248 150 - 450 K/uL Final     Creatinine   Date Value Ref Range Status   06/08/2021 1.0 0.5 - 1.4 mg/dL Final     TSH   Date Value Ref Range Status   09/29/2021 1.754 0.400 - 4.000 uIU/mL Final     Glucose   Date Value Ref Range Status   06/08/2021 124 (H) 70 - 110 mg/dL Final     Hemoglobin A1C   Date Value Ref Range Status   06/08/2021 6.3 (H) 4.0 - 5.6 % Final     Comment:     ADA Screening Guidelines:  5.7-6.4%  Consistent with prediabetes  >or=6.5%  Consistent with diabetes    High  levels of fetal hemoglobin interfere with the HbA1C  assay. Heterozygous hemoglobin variants (HbS, HgC, etc)do  not significantly interfere with this assay.   However, presence of multiple variants may affect accuracy.           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  CT 04/08/2022.  Fine-needle aspiration biopsy 04/27/2022    Seborrheic keratosis                   Plan:      A fine-needle aspiration biopsy was performed today.  As discussed this is likely a benign growth/tumor of the left salivary/parotid gland.  Biopsy results from the fine-needle aspiration should be available in a week.  Patient to call if not notified.    Following up by telehealth or in person to discuss surgical options depending upon the results of the needle biopsy or if necessary repeat needle biopsy in the office or with pathology as discussed.    Mild tenderness after aspiration is expected but any sudden swelling redness and increasing pain would need to be treated with antibiotics.

## 2022-04-27 NOTE — PATIENT INSTRUCTIONS
A fine-needle aspiration biopsy was performed today.  As discussed this is likely a benign growth/tumor of the left salivary/parotid gland.  Biopsy results from the fine-needle aspiration should be available in a week.  Patient to call if not notified.    Following up by telehealth or in person to discuss surgical options depending upon the results of the needle biopsy or if necessary repeat needle biopsy in the office or with pathology as discussed.    Mild tenderness after aspiration is expected but any sudden swelling redness and increasing pain would need to be treated with antibiotics.

## 2022-04-27 NOTE — PROCEDURES
Fine-needle aspiration biopsy performed the office.    Informed consent obtained verbally.    Area prepped with alcohol.    A single entry site with a 1/2 inch 25 gauge needle and 10 cc syringe was performed with multiple passes aspirating the mass preparing 3 slides for alcohol fixation with the bowels and serial placed in [employer]to[employer] thin prep with appropriate labeling performed.

## 2022-05-02 ENCOUNTER — TELEPHONE (OUTPATIENT)
Dept: FAMILY MEDICINE | Facility: CLINIC | Age: 62
End: 2022-05-02
Payer: MEDICARE

## 2022-05-02 ENCOUNTER — PATIENT MESSAGE (OUTPATIENT)
Dept: OTOLARYNGOLOGY | Facility: CLINIC | Age: 62
End: 2022-05-02
Payer: MEDICARE

## 2022-05-02 ENCOUNTER — HOSPITAL ENCOUNTER (EMERGENCY)
Facility: HOSPITAL | Age: 62
Discharge: HOME | End: 2022-05-02
Attending: EMERGENCY MEDICINE
Payer: MEDICARE

## 2022-05-02 VITALS
RESPIRATION RATE: 18 BRPM | HEIGHT: 69 IN | DIASTOLIC BLOOD PRESSURE: 92 MMHG | TEMPERATURE: 98 F | SYSTOLIC BLOOD PRESSURE: 137 MMHG | BODY MASS INDEX: 46.65 KG/M2 | HEART RATE: 97 BPM | OXYGEN SATURATION: 98 % | WEIGHT: 315 LBS

## 2022-05-02 DIAGNOSIS — R22.1 NECK MASS: Primary | ICD-10-CM

## 2022-05-02 DIAGNOSIS — R60.9 PAROTID SWELLING: ICD-10-CM

## 2022-05-02 PROCEDURE — 99284 EMERGENCY DEPT VISIT MOD MDM: CPT

## 2022-05-02 RX ORDER — CEFUROXIME AXETIL 500 MG/1
500 TABLET ORAL DAILY
Qty: 7 TABLET | Refills: 0 | Status: SHIPPED | OUTPATIENT
Start: 2022-05-02 | End: 2022-05-09

## 2022-05-02 RX ORDER — HYDROCODONE BITARTRATE AND ACETAMINOPHEN 5; 325 MG/1; MG/1
1 TABLET ORAL EVERY 8 HOURS PRN
Qty: 9 TABLET | Refills: 0 | Status: SHIPPED | OUTPATIENT
Start: 2022-05-02

## 2022-05-02 RX ORDER — METRONIDAZOLE 500 MG/1
500 TABLET ORAL 3 TIMES DAILY
Qty: 21 TABLET | Refills: 0 | Status: SHIPPED | OUTPATIENT
Start: 2022-05-02 | End: 2022-05-09

## 2022-05-02 NOTE — DISCHARGE INSTRUCTIONS
Take antibiotics as prescribed.  Follow up closely with your ENT doctor. Call them tomorrow and notify them of symptoms. Take pain medication as needed.  For worsening symptoms, chest pain, shortness of breath, increased abdominal pain, high grade fever, stroke or stroke like symptoms, immediately go to the nearest Emergency Room or call 911 as soon as possible.

## 2022-05-02 NOTE — TELEPHONE ENCOUNTER
First available appointment in [facility] noted on tomorrow. One appointment noted in [facility] today. Also advised patient can be seen at urgent care today if above appointments inconvenient. Patient states he will go to ER today; states he is in pain, and was unable to sleep last night. Does not wish to wait for appointment tomorrow.              ----- Message from Ani Cleary sent at 5/2/2022 10:07 AM CDT -----  Contact: patient  Type:  Same Day Appointment Request    Caller is requesting a same day appointment.  Caller declined first available appointment listed below.      Name of Caller:  patient  When is the first available appointment?  5/3  Symptoms:  lump on neck, swollen and enflamed  Best Call Back Number:  [phone] (home)  Additional Information:

## 2022-05-02 NOTE — FIRST PROVIDER EVALUATION
Emergency Department TeleTriage Encounter Note      CHIEF COMPLAINT    Chief Complaint   Patient presents with    Mass     To left side of neck, tenderness and swelling to mass starting yesterday; biopsy of lump on Wednesday        VITAL SIGNS   Initial Vitals [05/02/22 1638]   BP Pulse Resp Temp SpO2   (!) 137/92 97 18 98.4 °F (36.9 °C) 98 %      MAP       --            ALLERGIES    Review of patient's allergies indicates:   Allergen Reactions    Codeine     Penicillins        PROVIDER TRIAGE NOTE      62-year-old male presents the ER for evaluation of mass to the neck.  Had recent biopsy.  Feels as though the site is getting bigger.  No difficulty breathing or swallowing at this time.  No fever.    PE:  Patient alert and oriented. No acute distress  Large left neck mass    No orders placed at this time. Care will be transferred to an alternate provider when patient is roomed for a full evaluation. Any additional orders and the final disposition will be determined by that provider.      ORDERS  Labs Reviewed - No data to display    ED Orders (720h ago, onward)    None            Virtual Visit Note: The provider triage portion of this emergency department evaluation and documentation was performed via [employer], a HIPAA-compliant telemedicine application, in concert with a tele-presenter in the room. A face to face patient evaluation with one of my colleagues will occur once the patient is placed in an emergency department room.      DISCLAIMER: This note was prepared with [employer] voice recognition transcription software. Garbled syntax, mangled pronouns, and other bizarre constructions may be attributed to that software system.

## 2022-05-02 NOTE — ED PROVIDER NOTES
Encounter Date: 5/2/2022    SCRIBE #1 NOTE: I, Sara Arce, am scribing for, and in the presence of, Alice Jeffery PA-C.       History     Chief Complaint   Patient presents with    Mass     To left side of neck, tenderness and swelling to mass starting yesterday; biopsy of lump on Wednesday      Time seen by provider: 5:45 PM on 05/02/2022    Elliott Ayala is a 62 y.o. male who presents to the ED with an onset of mass to left side of neck. Patient recently had left neck mass biopsied (4/27/22). He c/o increased swelling and pain to mass that developed last night. He also mentions mass has become more firm. The patient denies trouble swallowing, fever, redness or warmth to neck, or any other symptoms at this time. Biopsy results are to be reported within the next couple days, at which time, patient will receive call for f/u appointment with ENT. No pertinent PMHx or PSHx.    The history is provided by the patient.     Review of patient's allergies indicates:   Allergen Reactions    Codeine     Penicillins      Past Medical History:   Diagnosis Date    Allergy      Past Surgical History:   Procedure Laterality Date    ACHILLES TENDON SURGERY       Family History   Problem Relation Age of Onset    Diabetes Mother     Heart disease Father     Diabetes Maternal Aunt     Diabetes Maternal Uncle     Glaucoma Neg Hx     Lupus Neg Hx     Psoriasis Neg Hx     Melanoma Neg Hx      Social History     Tobacco Use    Smoking status: Former Smoker    Smokeless tobacco: Never Used    Tobacco comment: quit over 10 years ago   Substance Use Topics    Alcohol use: Yes     Comment: social     Drug use: Yes     Types: Marijuana     Review of Systems   Constitutional: Negative for activity change, appetite change, chills and fever.   HENT: Negative for congestion, rhinorrhea, sore throat and trouble swallowing.    Eyes: Negative for redness and visual disturbance.   Respiratory: Negative for cough, chest  tightness and shortness of breath.    Cardiovascular: Negative for chest pain.   Gastrointestinal: Negative for abdominal pain, diarrhea, nausea and vomiting.   Genitourinary: Negative for dysuria and frequency.   Musculoskeletal: Positive for neck pain. Negative for back pain and neck stiffness.        Positive for neck swelling and induration.   Skin: Negative for color change and rash.   Neurological: Negative for dizziness, syncope, numbness and headaches.       Physical Exam     Initial Vitals [05/02/22 1638]   BP Pulse Resp Temp SpO2   (!) 137/92 97 18 98.4 °F (36.9 °C) 98 %      MAP       --         Physical Exam    Constitutional: He appears well-developed and well-nourished. He is cooperative.  Non-toxic appearance. He does not have a sickly appearance.   HENT:   Head: Normocephalic and atraumatic.   Right Ear: Tympanic membrane and external ear normal.   Left Ear: Tympanic membrane and external ear normal.   Nose: Nose normal.   Mouth/Throat: Uvula is midline and oropharynx is clear and moist.   Eyes: Conjunctivae and lids are normal.   Neck: Neck supple.   Swelling and tenderness to the left parotid with no overlying skin changes. No oral lesions. Uvula midline.    Normal range of motion.   Full passive range of motion without pain.     Cardiovascular: Normal rate, regular rhythm and normal heart sounds. Exam reveals no gallop and no friction rub.    No murmur heard.  Pulmonary/Chest: Breath sounds normal. He has no wheezes. He has no rhonchi. He has no rales.   Musculoskeletal:      Cervical back: Full passive range of motion without pain, normal range of motion and neck supple.     Neurological: He is alert.   Skin: Skin is warm, dry and intact. No rash noted.         ED Course   Procedures  Labs Reviewed - No data to display       Imaging Results    None          Medications - No data to display  Medical Decision Making:   History:   Old Medical Records: I decided to obtain old medical records.        APC / Resident Notes:   Urgent evaluation of a 62-year-old male who presents with swelling and pain to the left parotid area after having a biopsy recently.  He denies fever.  He denies difficulty swallowing.  He denies overlying redness or skin changes.  He has some associated tenderness to the area.  Normal oropharynx.  Will cover with Ceftin and Flagyl secondary to penicillin allergy.  Recommend he follow up very closely with ENT. Return precautions given. Based on my clinical evaluation, I do not appreciate any immediate, emergent, or life threatening condition or etiology that warrants additional workup today and feel that the patient can be discharged with close follow up care.  Patient is to follow up with their primary care provider. Case was discussed with Dr. San who is in agreement with the plan of care. All questions answered.        Scribe Attestation:   Scribe #1: I performed the above scribed service and the documentation accurately describes the services I performed. I attest to the accuracy of the note.               I, Alice Jeffery PA-C, personally performed the services described in this documentation. All medical record entries made by the scribe were at my direction and in my presence.  I have reviewed the chart and agree that the record reflects my personal performance and is accurate and complete. Alice Jeffery PA-C.  8:50 PM 05/02/2022    Clinical Impression:   Final diagnoses:  [R22.1] Neck mass (Primary)  [R60.9] Parotid swelling          ED Disposition Condition    Discharge Stable        ED Prescriptions     Medication Sig Dispense Start Date End Date Auth. Provider    metroNIDAZOLE (FLAGYL) 500 MG tablet Take 1 tablet (500 mg total) by mouth 3 (three) times daily. for 7 days 21 tablet 5/2/2022 5/9/2022 Alice Jeffery PA-C    cefUROXime (CEFTIN) 500 MG tablet Take 1 tablet (500 mg total) by mouth once daily. for 7 days 7 tablet 5/2/2022 5/9/2022 Alice  Lissy Jeffery PA-C    HYDROcodone-acetaminophen (NORCO) 5-325 mg per tablet Take 1 tablet by mouth every 8 (eight) hours as needed for Pain. 9 tablet 5/2/2022  Alice Jeffery PA-C        Follow-up Information     Follow up With Specialties Details Why Contact Info    [facility]  Schedule an appointment as soon as possible for a visit  For follow up if you don't have a primary care provider [phone]    Cesar Jo MD Otolaryngology   [address] [facility] [address]  [phone]      [facility] Dept Emergency Medicine  As needed [address] [place] [id number] [phone]           Alice Jeffery PA-C  05/02/22 2051

## 2022-05-03 LAB
FINAL PATHOLOGIC DIAGNOSIS: NORMAL
Lab: NORMAL

## 2022-05-03 NOTE — TELEPHONE ENCOUNTER
Please see My Chart message and advise. I scheduled pt for Thursday afternoon as it was the only opening. Would you like me to overbook tomorrow?  Thanks, Juanita

## 2022-05-04 ENCOUNTER — TELEPHONE (OUTPATIENT)
Dept: OTOLARYNGOLOGY | Facility: CLINIC | Age: 62
End: 2022-05-04
Payer: MEDICARE

## 2022-05-04 NOTE — PROGRESS NOTES
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  Needle biopsies are not 100% sensitive or specific so other tumors are not excluded.  Excision in the operating room is the only definitive way to make sure it is indeed a benign Warthin's tumor.  These tumors are not specifically pre cancerous in any way.  They sometimes do swell and shrink.  Following clinically with or without serial ultrasound versus excision is recommended.  This can be discussed further at follow-up."

## 2022-05-04 NOTE — TELEPHONE ENCOUNTER
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  Needle biopsies are not 100% sensitive or specific so other tumors are not excluded.  Excision in the operating room is the only definitive way to make sure it is indeed a benign Warthin's tumor.  These tumors are not specifically pre cancerous in any way.  They sometimes do swell and shrink.  Following clinically with or without serial ultrasound versus excision is recommended.  This can be discussed further at follow-up.  "

## 2022-05-04 NOTE — TELEPHONE ENCOUNTER
Pt called back. Advised of results and recommendations from Dr. Jo regarding the FNA results. Also spoke to pt regarding the response from Dr. Jo regarding appt for ER f/u (see below). Pt states that he is getting somewhat better, the pain is not throbbing like it was when he went to the ER. Pt will cancel appt tomorrow and will finish out meds given at ER. If not better next week, he will call back to be seen. Dr. Jo advised. Thanks, Juanita      (May 4, 2022Cesar Jo MD to Elliott Ayala      9:15 AM  I am surprised he did not call me instead went to the ER.  I specifically mentioned that any a biopsy can inflamed these masses or even cause infection and if things became swollen and painful for more than just a few hours we would want to prescribe an antibiotic.  I do not think it is critical to be seen on Thursday if improving with antibiotic.  If however symptoms are worsening I would be happy to see you today.    Last read by Elliott Ayala at 1:13 PM on 5/4/2022.)

## 2022-06-07 ENCOUNTER — TELEPHONE (OUTPATIENT)
Dept: FAMILY MEDICINE | Facility: CLINIC | Age: 62
End: 2022-06-07
Payer: MEDICARE

## 2022-06-07 NOTE — TELEPHONE ENCOUNTER
Called patient to reschedule appointment that is scheduled for Thursday 06/30/22 due to Mr. Ramirez not being in clinic on Thursdays. No answer left VM to reschedule. Omit msg sent.

## 2022-06-13 ENCOUNTER — PATIENT MESSAGE (OUTPATIENT)
Dept: FAMILY MEDICINE | Facility: CLINIC | Age: 62
End: 2022-06-13
Payer: MEDICARE

## 2022-06-13 DIAGNOSIS — E03.9 HYPOTHYROIDISM, UNSPECIFIED TYPE: ICD-10-CM

## 2022-06-13 RX ORDER — LEVOTHYROXINE SODIUM 75 UG/1
75 TABLET ORAL
Qty: 30 TABLET | Refills: 11 | Status: SHIPPED | OUTPATIENT
Start: 2022-06-13 | End: 2023-06-21 | Stop reason: SDUPTHER

## 2022-06-30 ENCOUNTER — LAB VISIT (OUTPATIENT)
Dept: LAB | Facility: HOSPITAL | Age: 62
End: 2022-06-30
Payer: MEDICARE

## 2022-06-30 DIAGNOSIS — E66.01 MORBID OBESITY WITH BMI OF 40.0-44.9, ADULT: ICD-10-CM

## 2022-06-30 DIAGNOSIS — R73.03 PREDIABETES: ICD-10-CM

## 2022-06-30 DIAGNOSIS — Z12.5 PROSTATE CANCER SCREENING: ICD-10-CM

## 2022-06-30 LAB
ALBUMIN SERPL BCP-MCNC: 3.4 G/DL (ref 3.5–5.2)
ALP SERPL-CCNC: 52 U/L (ref 55–135)
ALT SERPL W/O P-5'-P-CCNC: 17 U/L (ref 10–44)
ANION GAP SERPL CALC-SCNC: 8 MMOL/L (ref 8–16)
AST SERPL-CCNC: 13 U/L (ref 10–40)
BASOPHILS # BLD AUTO: 0.04 K/UL (ref 0–0.2)
BASOPHILS NFR BLD: 0.6 % (ref 0–1.9)
BILIRUB SERPL-MCNC: 0.3 MG/DL (ref 0.1–1)
BUN SERPL-MCNC: 10 MG/DL (ref 6–20)
CALCIUM SERPL-MCNC: 9.2 MG/DL (ref 8.7–10.5)
CHLORIDE SERPL-SCNC: 107 MMOL/L (ref 95–110)
CHOLEST SERPL-MCNC: 164 MG/DL (ref 120–199)
CHOLEST/HDLC SERPL: 4.8 {RATIO} (ref 2–5)
CO2 SERPL-SCNC: 30 MMOL/L (ref 23–29)
COMPLEXED PSA SERPL-MCNC: 3.2 NG/ML (ref 0–4)
CREAT SERPL-MCNC: 1.1 MG/DL (ref 0.5–1.4)
DIFFERENTIAL METHOD: ABNORMAL
EOSINOPHIL # BLD AUTO: 0.2 K/UL (ref 0–0.5)
EOSINOPHIL NFR BLD: 3.8 % (ref 0–8)
ERYTHROCYTE [DISTWIDTH] IN BLOOD BY AUTOMATED COUNT: 13.2 % (ref 11.5–14.5)
EST. GFR  (AFRICAN AMERICAN): >60 ML/MIN/1.73 M^2
EST. GFR  (NON AFRICAN AMERICAN): >60 ML/MIN/1.73 M^2
ESTIMATED AVG GLUCOSE: 131 MG/DL (ref 68–131)
GLUCOSE SERPL-MCNC: 115 MG/DL (ref 70–110)
HBA1C MFR BLD: 6.2 % (ref 4–5.6)
HCT VFR BLD AUTO: 41.8 % (ref 40–54)
HDLC SERPL-MCNC: 34 MG/DL (ref 40–75)
HDLC SERPL: 20.7 % (ref 20–50)
HGB BLD-MCNC: 13.7 G/DL (ref 14–18)
IMM GRANULOCYTES # BLD AUTO: 0.02 K/UL (ref 0–0.04)
IMM GRANULOCYTES NFR BLD AUTO: 0.3 % (ref 0–0.5)
LDLC SERPL CALC-MCNC: 101 MG/DL (ref 63–159)
LYMPHOCYTES # BLD AUTO: 2.1 K/UL (ref 1–4.8)
LYMPHOCYTES NFR BLD: 33.4 % (ref 18–48)
MCH RBC QN AUTO: 28.6 PG (ref 27–31)
MCHC RBC AUTO-ENTMCNC: 32.8 G/DL (ref 32–36)
MCV RBC AUTO: 87 FL (ref 82–98)
MONOCYTES # BLD AUTO: 0.5 K/UL (ref 0.3–1)
MONOCYTES NFR BLD: 7.5 % (ref 4–15)
NEUTROPHILS # BLD AUTO: 3.5 K/UL (ref 1.8–7.7)
NEUTROPHILS NFR BLD: 54.4 % (ref 38–73)
NONHDLC SERPL-MCNC: 130 MG/DL
NRBC BLD-RTO: 0 /100 WBC
PLATELET # BLD AUTO: 277 K/UL (ref 150–450)
PMV BLD AUTO: 10.3 FL (ref 9.2–12.9)
POTASSIUM SERPL-SCNC: 3.9 MMOL/L (ref 3.5–5.1)
PROT SERPL-MCNC: 6.9 G/DL (ref 6–8.4)
RBC # BLD AUTO: 4.79 M/UL (ref 4.6–6.2)
SODIUM SERPL-SCNC: 145 MMOL/L (ref 136–145)
TRIGL SERPL-MCNC: 145 MG/DL (ref 30–150)
WBC # BLD AUTO: 6.38 K/UL (ref 3.9–12.7)

## 2022-06-30 PROCEDURE — 85025 COMPLETE CBC W/AUTO DIFF WBC: CPT

## 2022-06-30 PROCEDURE — 84153 ASSAY OF PSA TOTAL: CPT

## 2022-06-30 PROCEDURE — 36415 COLL VENOUS BLD VENIPUNCTURE: CPT | Mod: PO

## 2022-06-30 PROCEDURE — 80061 LIPID PANEL: CPT

## 2022-06-30 PROCEDURE — 80053 COMPREHEN METABOLIC PANEL: CPT

## 2022-06-30 PROCEDURE — 83036 HEMOGLOBIN GLYCOSYLATED A1C: CPT

## 2022-07-01 ENCOUNTER — TELEPHONE (OUTPATIENT)
Dept: FAMILY MEDICINE | Facility: CLINIC | Age: 62
End: 2022-07-01
Payer: MEDICARE

## 2022-07-01 NOTE — TELEPHONE ENCOUNTER
----- Message from Colin Ramirez PA-C sent at 7/1/2022  9:17 AM CDT -----  Labs are stable with no acute health concerns.

## 2022-07-06 ENCOUNTER — OFFICE VISIT (OUTPATIENT)
Dept: FAMILY MEDICINE | Facility: CLINIC | Age: 62
End: 2022-07-06
Payer: MEDICARE

## 2022-07-06 VITALS
OXYGEN SATURATION: 97 % | WEIGHT: 315 LBS | TEMPERATURE: 100 F | BODY MASS INDEX: 46.65 KG/M2 | RESPIRATION RATE: 14 BRPM | SYSTOLIC BLOOD PRESSURE: 138 MMHG | HEART RATE: 99 BPM | HEIGHT: 69 IN | DIASTOLIC BLOOD PRESSURE: 80 MMHG

## 2022-07-06 DIAGNOSIS — E03.9 HYPOTHYROIDISM, UNSPECIFIED TYPE: ICD-10-CM

## 2022-07-06 DIAGNOSIS — R73.03 PREDIABETES: ICD-10-CM

## 2022-07-06 DIAGNOSIS — H81.10 BENIGN PAROXYSMAL POSITIONAL VERTIGO, UNSPECIFIED LATERALITY: Primary | ICD-10-CM

## 2022-07-06 PROCEDURE — 99214 OFFICE O/P EST MOD 30 MIN: CPT | Mod: PBBFAC,PO

## 2022-07-06 PROCEDURE — 99999 PR PBB SHADOW E&M-EST. PATIENT-LVL IV: ICD-10-PCS | Mod: PBBFAC,,,

## 2022-07-06 PROCEDURE — 99214 PR OFFICE/OUTPT VISIT, EST, LEVL IV, 30-39 MIN: ICD-10-PCS | Mod: S$PBB,,,

## 2022-07-06 PROCEDURE — 99999 PR PBB SHADOW E&M-EST. PATIENT-LVL IV: CPT | Mod: PBBFAC,,,

## 2022-07-06 PROCEDURE — 99214 OFFICE O/P EST MOD 30 MIN: CPT | Mod: S$PBB,,,

## 2022-07-06 NOTE — PROGRESS NOTES
Subjective:       Patient ID: Elliott Ayala is a 62 y.o. male.    Chief Complaint: Follow-up, Dizziness, and Numbness (R arm)    Elliott Ayala is a 61 yo M pt w/ MHx listed below that presents to the clinic for routine follow up of chronic medical conditions. Recently had labs which were stable with no acute health concerns. He does have a few health complaints at presentation today. He explains that he has been feeling dizzy which he has chronically dealt with and taken antivert. Lately he feels like the medication is not working as well. His dizziness is associated with position changes. He feels like he may fall if he does not steady himself when getting up. He denies any other associated symptoms. He does complain of an episode of R arm pain and tingling after waking up four days ago. This symptom improved daily since.       Past Medical History:   Diagnosis Date    Allergy        Review of patient's allergies indicates:   Allergen Reactions    Codeine     Penicillins          Current Outpatient Medications:     azelastine (ASTELIN) 137 mcg (0.1 %) nasal spray, 1 spray (137 mcg total) by Nasal route 2 (two) times daily., Disp: 30 mL, Rfl: 2    ibuprofen (ADVIL,MOTRIN) 600 MG tablet, Take 1 tablet (600 mg total) by mouth 3 (three) times daily as needed for Pain., Disp: 30 tablet, Rfl: 0    levothyroxine (SYNTHROID) 75 MCG tablet, Take 1 tablet (75 mcg total) by mouth before breakfast., Disp: 30 tablet, Rfl: 11    meclizine (ANTIVERT) 25 mg tablet, Take 1 tablet (25 mg total) by mouth 3 (three) times daily as needed for Dizziness., Disp: 30 tablet, Rfl: 2    tadalafiL (CIALIS) 10 MG tablet, Take 1 tablet (10 mg total) by mouth daily as needed for Erectile Dysfunction., Disp: 30 tablet, Rfl: 2    HYDROcodone-acetaminophen (NORCO) 5-325 mg per tablet, Take 1 tablet by mouth every 8 (eight) hours as needed for Pain. (Patient not taking: Reported on 7/6/2022), Disp: 9 tablet, Rfl: 0    Review of Systems  "  Constitutional: Negative for activity change, appetite change, chills, diaphoresis, fatigue, fever and unexpected weight change.   HENT: Negative for congestion, ear pain, postnasal drip, rhinorrhea, sinus pressure, sneezing, sore throat and trouble swallowing.    Eyes: Negative for pain, itching and visual disturbance.   Respiratory: Negative for cough, chest tightness, shortness of breath and wheezing.    Cardiovascular: Negative for chest pain, palpitations and leg swelling.   Gastrointestinal: Negative for abdominal distention, abdominal pain, blood in stool, constipation, diarrhea, nausea and vomiting.   Endocrine: Negative for cold intolerance and heat intolerance.   Genitourinary: Negative for difficulty urinating, dysuria, frequency, hematuria and urgency.   Musculoskeletal: Negative for arthralgias, back pain, myalgias and neck pain.   Skin: Negative for color change, pallor, rash and wound.   Neurological: Positive for dizziness. Negative for syncope, weakness, numbness and headaches.   Hematological: Negative for adenopathy.   Psychiatric/Behavioral: Negative for behavioral problems. The patient is not nervous/anxious.        Objective:      /80 (BP Location: Right arm, Patient Position: Sitting, BP Method: Large (Manual))   Pulse 99   Temp 100.1 °F (37.8 °C) (Oral)   Resp 14   Ht 5' 9" (1.753 m)   Wt (!) 150.1 kg (330 lb 14.6 oz)   SpO2 97%   BMI 48.87 kg/m²   Physical Exam  Vitals reviewed.   Constitutional:       General: He is not in acute distress.     Appearance: Normal appearance. He is obese. He is not ill-appearing, toxic-appearing or diaphoretic.   HENT:      Head: Normocephalic.      Right Ear: External ear normal.      Left Ear: External ear normal.      Nose: Nose normal. No congestion or rhinorrhea.      Mouth/Throat:      Mouth: Mucous membranes are moist.      Pharynx: Oropharynx is clear.   Eyes:      General: No scleral icterus.        Right eye: No discharge.         Left " eye: No discharge.      Extraocular Movements: Extraocular movements intact.      Conjunctiva/sclera: Conjunctivae normal.   Cardiovascular:      Rate and Rhythm: Normal rate and regular rhythm.      Pulses: Normal pulses.      Heart sounds: Normal heart sounds. No murmur heard.    No friction rub. No gallop.   Pulmonary:      Effort: Pulmonary effort is normal. No respiratory distress.      Breath sounds: Normal breath sounds. No wheezing, rhonchi or rales.   Chest:      Chest wall: No tenderness.   Abdominal:      General: There is no distension.      Palpations: Abdomen is soft. There is no mass.      Tenderness: There is no abdominal tenderness. There is no guarding.   Musculoskeletal:         General: No swelling, tenderness, deformity or signs of injury. Normal range of motion.      Cervical back: Normal range of motion.      Right lower leg: No edema.      Left lower leg: No edema.   Skin:     General: Skin is warm and dry.      Capillary Refill: Capillary refill takes less than 2 seconds.      Coloration: Skin is not jaundiced.      Findings: No bruising, erythema, lesion or rash.   Neurological:      Mental Status: He is alert and oriented to person, place, and time.      Gait: Gait normal.   Psychiatric:         Mood and Affect: Mood normal.         Behavior: Behavior normal.         Thought Content: Thought content normal.         Judgment: Judgment normal.         Assessment:       1. Benign paroxysmal positional vertigo, unspecified laterality    2. Hypothyroidism, unspecified type    3. Prediabetes    4. Essential hypertension        Plan:       Benign paroxysmal positional vertigo, unspecified laterality  -     Ambulatory referral/consult to Physical/Occupational Therapy; Future; Expected date: 07/13/2022    Hypothyroidism, unspecified type  -     TSH; Future; Expected date: 10/06/2022  -     T4, Free; Future; Expected date: 10/06/2022    Prediabetes        -     Will continue to monitor.        Numbness and tingling likely due to sleeping on his arm or neck inappropriately.       Patient to contact clinic once he decides the provider he would like to establish with. Provided names of providers accepting new patients. Due to seeing me in the past I did offer him to see Dr. Spring prior to him only being able to accept 55 years or younger.        Colin Ramirez PA-C  Family Medicine Physician Assistant       I spent a total of 20 minutes on the day of the visit.This includes face to face time and non-face to face time preparing to see the patient (eg, review of tests), obtaining and/or reviewing separately obtained history, documenting clinical information in the electronic or other health record, independently interpreting results and communicating results to the patient/family/caregiver, or care coordinator.      We have addressed [4] Moderate: 1 or more chronic illnesses with exacerbation, progression, or side effects of treatment / 2 or more stable chronic illnesses / 1 undiagnosed new problem with uncertain prognosis / 1 acute illness with systemic symptoms / 1 acute complicated injury  The complexity of the data reviewed and analyzed for this visit was [3] Limited (Reviewed prior external note, ordered unique testing or reviewed the results of each unique test)   The risk of complications and/or morbidity or mortality are [4] Moderate risk (I.e. prescription drug management / decision regarding minor surgery with identified pt or procedure risk factors / decision regarding elective major surgery without identified pt or procedure risk factors / diagnosis or treatment significantly limited by social determinants of health)   The level of Medical Decision Making for this visit is [4] Moderate

## 2022-07-06 NOTE — PATIENT INSTRUCTIONS
Aditya Spring MD*     Ian Tillman MD    Call my staff to schedule an appointment with either one when you make a selection. Six months follow up is fine.     Jw Gallagher,     If you are due for any health screening(s) below please notify me so we can arrange them to be ordered and scheduled to maintain your health. Most healthy patients complete it. Don't lose out on improving your health.     Health Maintenance   Topic Date Due    TETANUS VACCINE  Never done    PROSTATE-SPECIFIC ANTIGEN  06/30/2023    Lipid Panel  06/30/2027    Hepatitis C Screening  Completed          Colon Cancer Screening    Of cancers affecting both men and women, colorectal cancer is the third leading cancer killer in the [place]. But it doesnt have to be. Screening can prevent colorectal cancer or find it at an early stage when treatment often leads to a cure.    A colonoscopy is the preferred test for detecting colon cancer. It is needed only once every 10 years if results are negative. While sedated, a flexible, lighted tube with a tiny camera is inserted into the rectum and advanced through the colon to look for cancers. An alternative screening test that is used at home and returned to the lab may also be used. It detects hidden blood in bowel movements which could indicate cancer in the colon. If results are positive, you will need a colonoscopy to determine if the blood is a sign of cancer. This type of follow up (diagnostic) colonoscopy usually requires additional copays as required by your insurance provider. Please contact your PCP if you have any questions.    Although you are still overdue for this important screening, due to the COVID-19 pandemic, we recommend scheduling it in the near future.

## 2022-08-07 NOTE — TELEPHONE ENCOUNTER
----- Message from [address] sent at 6/16/2020  8:55 AM CDT -----  Type:  Pharmacy Calling to Clarify an RX    Name of Caller:  Christopher  [facility]   Prescription Name:  colchicine 0.6 mg     What do they need to clarify?:  frequency for rx  Best Call Back Number:  [phone]  Additional Information:  [facility] called for clarification for rx colchicine 0.6 mg      
Clarification given.   
lacerations

## 2022-09-26 ENCOUNTER — PATIENT MESSAGE (OUTPATIENT)
Dept: FAMILY MEDICINE | Facility: CLINIC | Age: 62
End: 2022-09-26
Payer: MEDICARE

## 2022-09-26 ENCOUNTER — TELEPHONE (OUTPATIENT)
Dept: FAMILY MEDICINE | Facility: CLINIC | Age: 62
End: 2022-09-26
Payer: MEDICARE

## 2022-10-18 ENCOUNTER — LAB VISIT (OUTPATIENT)
Dept: LAB | Facility: HOSPITAL | Age: 62
End: 2022-10-18
Payer: MEDICARE

## 2022-10-18 DIAGNOSIS — E03.9 HYPOTHYROIDISM, UNSPECIFIED TYPE: ICD-10-CM

## 2022-10-18 LAB
T4 FREE SERPL-MCNC: 0.91 NG/DL (ref 0.71–1.51)
TSH SERPL DL<=0.005 MIU/L-ACNC: 1.37 UIU/ML (ref 0.4–4)

## 2022-10-18 PROCEDURE — 84439 ASSAY OF FREE THYROXINE: CPT

## 2022-10-18 PROCEDURE — 36415 COLL VENOUS BLD VENIPUNCTURE: CPT | Mod: PO

## 2022-10-18 PROCEDURE — 84443 ASSAY THYROID STIM HORMONE: CPT

## 2022-10-20 ENCOUNTER — TELEPHONE (OUTPATIENT)
Dept: FAMILY MEDICINE | Facility: CLINIC | Age: 62
End: 2022-10-20
Payer: MEDICARE

## 2022-10-20 NOTE — TELEPHONE ENCOUNTER
----- Message from Colin Ramirez PA-C sent at 10/19/2022  9:37 AM CDT -----  Thyroid labs normal.

## 2023-01-31 ENCOUNTER — OFFICE VISIT (OUTPATIENT)
Dept: FAMILY MEDICINE | Facility: CLINIC | Age: 63
End: 2023-01-31
Payer: MEDICARE

## 2023-01-31 VITALS
SYSTOLIC BLOOD PRESSURE: 138 MMHG | HEIGHT: 69 IN | OXYGEN SATURATION: 97 % | HEART RATE: 87 BPM | DIASTOLIC BLOOD PRESSURE: 86 MMHG | RESPIRATION RATE: 16 BRPM | TEMPERATURE: 99 F | WEIGHT: 302.5 LBS | BODY MASS INDEX: 44.8 KG/M2

## 2023-01-31 DIAGNOSIS — M94.0 COSTOCHONDRITIS: ICD-10-CM

## 2023-01-31 DIAGNOSIS — M17.12 PRIMARY OSTEOARTHRITIS OF LEFT KNEE: ICD-10-CM

## 2023-01-31 DIAGNOSIS — R73.03 PREDIABETES: ICD-10-CM

## 2023-01-31 DIAGNOSIS — E66.01 MORBID OBESITY WITH BMI OF 40.0-44.9, ADULT: ICD-10-CM

## 2023-01-31 DIAGNOSIS — E03.9 HYPOTHYROIDISM, UNSPECIFIED TYPE: ICD-10-CM

## 2023-01-31 DIAGNOSIS — S39.012A STRAIN OF MUSCLE, FASCIA AND TENDON OF LOWER BACK, INITIAL ENCOUNTER: Primary | ICD-10-CM

## 2023-01-31 PROCEDURE — 99214 PR OFFICE/OUTPT VISIT, EST, LEVL IV, 30-39 MIN: ICD-10-PCS | Mod: S$PBB,,,

## 2023-01-31 PROCEDURE — 99999 PR PBB SHADOW E&M-EST. PATIENT-LVL V: CPT | Mod: PBBFAC,,,

## 2023-01-31 PROCEDURE — 99214 OFFICE O/P EST MOD 30 MIN: CPT | Mod: S$PBB,,,

## 2023-01-31 PROCEDURE — 99215 OFFICE O/P EST HI 40 MIN: CPT | Mod: PBBFAC,PO

## 2023-01-31 PROCEDURE — 99999 PR PBB SHADOW E&M-EST. PATIENT-LVL V: ICD-10-PCS | Mod: PBBFAC,,,

## 2023-01-31 RX ORDER — IBUPROFEN 800 MG/1
800 TABLET ORAL 3 TIMES DAILY PRN
Qty: 60 TABLET | Refills: 0 | Status: SHIPPED | OUTPATIENT
Start: 2023-01-31

## 2023-01-31 RX ORDER — TIZANIDINE 2 MG/1
2 TABLET ORAL 3 TIMES DAILY PRN
Qty: 30 TABLET | Refills: 0 | Status: SHIPPED | OUTPATIENT
Start: 2023-01-31 | End: 2023-02-10

## 2023-01-31 NOTE — PATIENT INSTRUCTIONS
Jw Gallagher,     If you are due for any health screening(s) below please notify me so we can arrange them to be ordered and scheduled to maintain your health. Most healthy patients complete it. Don't lose out on improving your health.     Tests to Keep You Healthy    Colon Cancer Screening: DUE  Last Blood Pressure <= 139/89 (1/31/2023): Yes         Colon Cancer Screening    Of cancers affecting both men and women, colorectal cancer is the third leading cancer killer in the [place]. But it doesnt have to be. Screening can prevent colorectal cancer or find it at an early stage when treatment often leads to a cure.    A colonoscopy is the preferred test for detecting colon cancer. It is needed only once every 10 years if results are negative. While sedated, a flexible, lighted tube with a tiny camera is inserted into the rectum and advanced through the colon to look for cancers. An alternative screening test that is used at home and returned to the lab may also be used. It detects hidden blood in bowel movements which could indicate cancer in the colon. If results are positive, you will need a colonoscopy to determine if the blood is a sign of cancer. This type of follow up (diagnostic) colonoscopy usually requires additional copays as required by your insurance provider. Please contact your PCP if you have any questions.    Although you are still overdue for this important screening, due to the COVID-19 pandemic, we recommend scheduling it in the near future.

## 2023-01-31 NOTE — PROGRESS NOTES
Subjective:       Patient ID: Elliott Ayala is a 62 y.o. male.    Chief Complaint: Back Pain (Left side)    Back Pain  This is a new problem. The current episode started in the past 7 days. The problem occurs constantly. The problem is unchanged. The pain is present in the lumbar spine and thoracic spine. The quality of the pain is described as cramping. The pain is moderate. The symptoms are aggravated by bending, lying down, sitting, position, standing and twisting. Associated symptoms include chest pain. Pertinent negatives include no bowel incontinence. Risk factors include obesity and sedentary lifestyle.   Symptoms started while lying on the couch watching football. Was moving when symptoms started suddenly.   CP is associated with cough. Random in nature. Difficult for him to describe. Quickly resolves. Not reproducible to palpation.     Also complain of L knee popping when it's moving.     Past Medical History:   Diagnosis Date    Allergy        Review of patient's allergies indicates:   Allergen Reactions    Codeine     Penicillins          Current Outpatient Medications:     azelastine (ASTELIN) 137 mcg (0.1 %) nasal spray, 1 spray (137 mcg total) by Nasal route 2 (two) times daily., Disp: 30 mL, Rfl: 2    levothyroxine (SYNTHROID) 75 MCG tablet, Take 1 tablet (75 mcg total) by mouth before breakfast., Disp: 30 tablet, Rfl: 11    meclizine (ANTIVERT) 25 mg tablet, Take 1 tablet (25 mg total) by mouth 3 (three) times daily as needed for Dizziness., Disp: 30 tablet, Rfl: 2    tadalafiL (CIALIS) 10 MG tablet, Take 1 tablet (10 mg total) by mouth daily as needed for Erectile Dysfunction., Disp: 30 tablet, Rfl: 2    HYDROcodone-acetaminophen (NORCO) 5-325 mg per tablet, Take 1 tablet by mouth every 8 (eight) hours as needed for Pain. (Patient not taking: Reported on 7/6/2022), Disp: 9 tablet, Rfl: 0    ibuprofen (ADVIL,MOTRIN) 800 MG tablet, Take 1 tablet (800 mg total) by mouth 3 (three) times daily as needed  "for Pain (take with food)., Disp: 60 tablet, Rfl: 0    tiZANidine (ZANAFLEX) 2 MG tablet, Take 1 tablet (2 mg total) by mouth 3 (three) times daily as needed (muscle spasm)., Disp: 30 tablet, Rfl: 0    Review of Systems   Cardiovascular:  Positive for chest pain.   Gastrointestinal:  Negative for bowel incontinence.   Musculoskeletal:  Positive for arthralgias and back pain.     Objective:      /86 (BP Location: Right arm, Patient Position: Sitting, BP Method: Large (Manual))   Pulse 87   Temp 98.5 °F (36.9 °C) (Oral)   Resp 16   Ht 5' 9" (1.753 m)   Wt (!) 137.2 kg (302 lb 7.5 oz)   SpO2 97%   BMI 44.67 kg/m²   Physical Exam  Vitals reviewed.   Constitutional:       General: He is not in acute distress.     Appearance: Normal appearance. He is obese. He is not ill-appearing, toxic-appearing or diaphoretic.   HENT:      Head: Normocephalic.      Right Ear: External ear normal.      Left Ear: External ear normal.      Nose: Nose normal. No congestion or rhinorrhea.      Mouth/Throat:      Mouth: Mucous membranes are moist.      Pharynx: Oropharynx is clear.   Eyes:      General: No scleral icterus.        Right eye: No discharge.         Left eye: No discharge.      Extraocular Movements: Extraocular movements intact.      Conjunctiva/sclera: Conjunctivae normal.   Cardiovascular:      Rate and Rhythm: Normal rate and regular rhythm.      Pulses: Normal pulses.      Heart sounds: Normal heart sounds. No murmur heard.    No friction rub. No gallop.   Pulmonary:      Effort: Pulmonary effort is normal. No respiratory distress.      Breath sounds: Normal breath sounds. No wheezing, rhonchi or rales.   Chest:      Chest wall: No tenderness.   Musculoskeletal:         General: Tenderness present. No swelling or deformity. Normal range of motion.      Cervical back: Normal range of motion.      Right lower leg: No edema.      Left lower leg: No edema.   Skin:     General: Skin is warm and dry.      Capillary " Refill: Capillary refill takes less than 2 seconds.      Coloration: Skin is not jaundiced.      Findings: No bruising, erythema, lesion or rash.   Neurological:      Mental Status: He is alert and oriented to person, place, and time.   Psychiatric:         Mood and Affect: Mood normal.         Behavior: Behavior normal.         Thought Content: Thought content normal.         Judgment: Judgment normal.       Assessment:       1. Strain of muscle, fascia and tendon of lower back, initial encounter    2. Costochondritis    3. Primary osteoarthritis of left knee    4. Morbid obesity with BMI of 40.0-44.9, adult    5. Prediabetes    6. Hypothyroidism, unspecified type          Plan:       Strain of muscle, fascia and tendon of lower back, initial encounter  -     ibuprofen (ADVIL,MOTRIN) 800 MG tablet; Take 1 tablet (800 mg total) by mouth 3 (three) times daily as needed for Pain (take with food).  Dispense: 60 tablet; Refill: 0  -     tiZANidine (ZANAFLEX) 2 MG tablet; Take 1 tablet (2 mg total) by mouth 3 (three) times daily as needed (muscle spasm).  Dispense: 30 tablet; Refill: 0    Costochondritis  -     ibuprofen (ADVIL,MOTRIN) 800 MG tablet; Take 1 tablet (800 mg total) by mouth 3 (three) times daily as needed for Pain (take with food).  Dispense: 60 tablet; Refill: 0    Primary osteoarthritis of left knee  -     ibuprofen (ADVIL,MOTRIN) 800 MG tablet; Take 1 tablet (800 mg total) by mouth 3 (three) times daily as needed for Pain (take with food).  Dispense: 60 tablet; Refill: 0    Morbid obesity with BMI of 40.0-44.9, adult        -     Patient would benefit from healthy diet, exercise and weight loss. Overall health and wellbeing would likely improve.     Prediabetes        -    Will continue to monitor.    Hypothyroidism, unspecified type        -    Continue meds. Will continue to monitor.                 Colin Ramirez PA-C  Family Medicine Physician Assistant       I spent a total of 20 minutes on the  day of the visit.This includes face to face time and non-face to face time preparing to see the patient (eg, review of tests), obtaining and/or reviewing separately obtained history, documenting clinical information in the electronic or other health record, independently interpreting results and communicating results to the patient/family/caregiver, or care coordinator.      We have addressed [4] Moderate: 1 or more chronic illnesses with exacerbation, progression, or side effects of treatment / 2 or more stable chronic illnesses / 1 undiagnosed new problem with uncertain prognosis / 1 acute illness with systemic symptoms / 1 acute complicated injury  The complexity of the data reviewed and analyzed for this visit was [2] Minimal or None  The risk of complications and/or morbidity or mortality are [4] Moderate risk (I.e. prescription drug management / decision regarding minor surgery with identified pt or procedure risk factors / decision regarding elective major surgery without identified pt or procedure risk factors / diagnosis or treatment significantly limited by social determinants of health)   The level of Medical Decision Making for this visit is [4] Moderate

## 2023-04-24 ENCOUNTER — TELEPHONE (OUTPATIENT)
Dept: FAMILY MEDICINE | Facility: CLINIC | Age: 63
End: 2023-04-24
Payer: MEDICARE

## 2023-04-24 NOTE — TELEPHONE ENCOUNTER
[facility] Staff  Caller: Unspecified (Today, 11:28 AM)    ----- Message from lM Conner sent at 4/24/2023 11:28 AM CDT -----  Regarding: appt request  Who Called:DAMON CALLES [[id number]]         What is the reqeust in detail: Requesting call back to discuss if pt can get same day appt for swollen face. Please advise         Can the clinic reply by [facility]?no         Best Call Back Number:[phone]           Additional Information:

## 2023-04-24 NOTE — TELEPHONE ENCOUNTER
Patient reports having recent dental work performed. States right side of face swollen today (side where dental work was performed). First available appointment with any provider in clinic noted on tomorrow. Patient declined, states would like to be seen today. Writer advised another option for same day evaluation would be an urgent care facility. Patient verbalized understanding.

## 2023-11-21 ENCOUNTER — OFFICE VISIT (OUTPATIENT)
Dept: FAMILY MEDICINE | Facility: CLINIC | Age: 63
End: 2023-11-21
Payer: MEDICARE

## 2023-11-21 ENCOUNTER — LAB VISIT (OUTPATIENT)
Dept: LAB | Facility: HOSPITAL | Age: 63
End: 2023-11-21
Payer: MEDICARE

## 2023-11-21 VITALS
SYSTOLIC BLOOD PRESSURE: 132 MMHG | RESPIRATION RATE: 16 BRPM | TEMPERATURE: 99 F | WEIGHT: 298.5 LBS | HEART RATE: 102 BPM | HEIGHT: 69 IN | OXYGEN SATURATION: 95 % | BODY MASS INDEX: 44.21 KG/M2 | DIASTOLIC BLOOD PRESSURE: 82 MMHG

## 2023-11-21 DIAGNOSIS — E03.9 HYPOTHYROIDISM, UNSPECIFIED TYPE: ICD-10-CM

## 2023-11-21 DIAGNOSIS — L29.9 PRURITUS: ICD-10-CM

## 2023-11-21 DIAGNOSIS — E66.01 MORBID OBESITY WITH BMI OF 40.0-44.9, ADULT: ICD-10-CM

## 2023-11-21 DIAGNOSIS — R07.9 CHEST PAIN, UNSPECIFIED TYPE: ICD-10-CM

## 2023-11-21 DIAGNOSIS — R73.03 PREDIABETES: ICD-10-CM

## 2023-11-21 DIAGNOSIS — E78.5 DYSLIPIDEMIA: ICD-10-CM

## 2023-11-21 DIAGNOSIS — I10 ESSENTIAL HYPERTENSION: ICD-10-CM

## 2023-11-21 DIAGNOSIS — Z12.11 COLON CANCER SCREENING: ICD-10-CM

## 2023-11-21 DIAGNOSIS — L29.9 PRURITUS: Primary | ICD-10-CM

## 2023-11-21 DIAGNOSIS — Z12.5 SCREENING FOR MALIGNANT NEOPLASM OF PROSTATE: ICD-10-CM

## 2023-11-21 DIAGNOSIS — H81.10 BENIGN PAROXYSMAL VERTIGO, UNSPECIFIED LATERALITY: ICD-10-CM

## 2023-11-21 DIAGNOSIS — D11.9 WARTHIN'S TUMOR: ICD-10-CM

## 2023-11-21 LAB
ALBUMIN SERPL BCP-MCNC: 3.6 G/DL (ref 3.5–5.2)
ALP SERPL-CCNC: 52 U/L (ref 55–135)
ALT SERPL W/O P-5'-P-CCNC: 21 U/L (ref 10–44)
ANION GAP SERPL CALC-SCNC: 14 MMOL/L (ref 8–16)
AST SERPL-CCNC: 28 U/L (ref 10–40)
BASOPHILS # BLD AUTO: 0.04 K/UL (ref 0–0.2)
BASOPHILS NFR BLD: 0.6 % (ref 0–1.9)
BILIRUB SERPL-MCNC: 0.4 MG/DL (ref 0.1–1)
BUN SERPL-MCNC: 13 MG/DL (ref 8–23)
CALCIUM SERPL-MCNC: 9.1 MG/DL (ref 8.7–10.5)
CHLORIDE SERPL-SCNC: 105 MMOL/L (ref 95–110)
CHOLEST SERPL-MCNC: 176 MG/DL (ref 120–199)
CHOLEST/HDLC SERPL: 4.9 {RATIO} (ref 2–5)
CO2 SERPL-SCNC: 25 MMOL/L (ref 23–29)
COMPLEXED PSA SERPL-MCNC: 3.6 NG/ML (ref 0–4)
CREAT SERPL-MCNC: 0.9 MG/DL (ref 0.5–1.4)
CRP SERPL-MCNC: 21.5 MG/L (ref 0–8.2)
DIFFERENTIAL METHOD: ABNORMAL
EOSINOPHIL # BLD AUTO: 0.2 K/UL (ref 0–0.5)
EOSINOPHIL NFR BLD: 2.8 % (ref 0–8)
ERYTHROCYTE [DISTWIDTH] IN BLOOD BY AUTOMATED COUNT: 13.6 % (ref 11.5–14.5)
ERYTHROCYTE [SEDIMENTATION RATE] IN BLOOD BY WESTERGREN METHOD: 15 MM/HR (ref 0–10)
EST. GFR  (NO RACE VARIABLE): >60 ML/MIN/1.73 M^2
ESTIMATED AVG GLUCOSE: 120 MG/DL (ref 68–131)
GLUCOSE SERPL-MCNC: 100 MG/DL (ref 70–110)
HBA1C MFR BLD: 5.8 % (ref 4–5.6)
HCT VFR BLD AUTO: 40.9 % (ref 40–54)
HDLC SERPL-MCNC: 36 MG/DL (ref 40–75)
HDLC SERPL: 20.5 % (ref 20–50)
HGB BLD-MCNC: 13.4 G/DL (ref 14–18)
IMM GRANULOCYTES # BLD AUTO: 0.01 K/UL (ref 0–0.04)
IMM GRANULOCYTES NFR BLD AUTO: 0.1 % (ref 0–0.5)
LDLC SERPL CALC-MCNC: 121.8 MG/DL (ref 63–159)
LYMPHOCYTES # BLD AUTO: 2.5 K/UL (ref 1–4.8)
LYMPHOCYTES NFR BLD: 36 % (ref 18–48)
MCH RBC QN AUTO: 27.5 PG (ref 27–31)
MCHC RBC AUTO-ENTMCNC: 32.8 G/DL (ref 32–36)
MCV RBC AUTO: 84 FL (ref 82–98)
MONOCYTES # BLD AUTO: 0.6 K/UL (ref 0.3–1)
MONOCYTES NFR BLD: 9.3 % (ref 4–15)
NEUTROPHILS # BLD AUTO: 3.5 K/UL (ref 1.8–7.7)
NEUTROPHILS NFR BLD: 51.2 % (ref 38–73)
NONHDLC SERPL-MCNC: 140 MG/DL
NRBC BLD-RTO: 0 /100 WBC
PLATELET # BLD AUTO: 255 K/UL (ref 150–450)
PMV BLD AUTO: 10.9 FL (ref 9.2–12.9)
POTASSIUM SERPL-SCNC: 3.4 MMOL/L (ref 3.5–5.1)
PROT SERPL-MCNC: 7 G/DL (ref 6–8.4)
RBC # BLD AUTO: 4.87 M/UL (ref 4.6–6.2)
SODIUM SERPL-SCNC: 144 MMOL/L (ref 136–145)
T4 FREE SERPL-MCNC: 1.03 NG/DL (ref 0.71–1.51)
TRIGL SERPL-MCNC: 91 MG/DL (ref 30–150)
TSH SERPL DL<=0.005 MIU/L-ACNC: 0.85 UIU/ML (ref 0.4–4)
WBC # BLD AUTO: 6.86 K/UL (ref 3.9–12.7)

## 2023-11-21 PROCEDURE — 80053 COMPREHEN METABOLIC PANEL: CPT

## 2023-11-21 PROCEDURE — 99214 OFFICE O/P EST MOD 30 MIN: CPT | Mod: S$PBB,,,

## 2023-11-21 PROCEDURE — 84153 ASSAY OF PSA TOTAL: CPT

## 2023-11-21 PROCEDURE — 84439 ASSAY OF FREE THYROXINE: CPT

## 2023-11-21 PROCEDURE — 85025 COMPLETE CBC W/AUTO DIFF WBC: CPT

## 2023-11-21 PROCEDURE — 84443 ASSAY THYROID STIM HORMONE: CPT

## 2023-11-21 PROCEDURE — 86140 C-REACTIVE PROTEIN: CPT

## 2023-11-21 PROCEDURE — 85651 RBC SED RATE NONAUTOMATED: CPT | Mod: PO

## 2023-11-21 PROCEDURE — 99999 PR PBB SHADOW E&M-EST. PATIENT-LVL IV: CPT | Mod: PBBFAC,,,

## 2023-11-21 PROCEDURE — 36415 COLL VENOUS BLD VENIPUNCTURE: CPT | Mod: PO

## 2023-11-21 PROCEDURE — 99214 OFFICE O/P EST MOD 30 MIN: CPT | Mod: PBBFAC,PO

## 2023-11-21 PROCEDURE — 99999 PR PBB SHADOW E&M-EST. PATIENT-LVL IV: ICD-10-PCS | Mod: PBBFAC,,,

## 2023-11-21 PROCEDURE — 99214 PR OFFICE/OUTPT VISIT, EST, LEVL IV, 30-39 MIN: ICD-10-PCS | Mod: S$PBB,,,

## 2023-11-21 PROCEDURE — 80061 LIPID PANEL: CPT

## 2023-11-21 PROCEDURE — 83036 HEMOGLOBIN GLYCOSYLATED A1C: CPT

## 2023-11-21 RX ORDER — MECLIZINE HYDROCHLORIDE 25 MG/1
25 TABLET ORAL 3 TIMES DAILY PRN
Qty: 30 TABLET | Refills: 2 | Status: SHIPPED | OUTPATIENT
Start: 2023-11-21

## 2023-11-21 NOTE — PROGRESS NOTES
Subjective:       Patient ID: Elliott Ayala is a 63 y.o. male.    Chief Complaint: knot (On back of neck )    Originally scheduled appointment a month ago for a knot that formed on the back of his neck. Was associated with burning. Burning went down into his arms. These symptoms resolved but he continues to have burning/itching sensation all over his body he reports. Admits to having joint pain especially his knees but this is chronic for him.     Previous labs I ordered were never done. Will get these with inflammatory markers today.     Warthin's tumor. Saw Jo In the past. Recommended repeat US to monitor over time or removal for definitive diagnosis and treatment. Has noticed its gotten harder and more noticeable recently.     Refuses flu shot.    Needs to do colon cancer screening. Ok with scheduling scope.         Past Medical History:   Diagnosis Date    Allergy        Review of patient's allergies indicates:   Allergen Reactions    Codeine     Penicillins          Current Outpatient Medications:     ibuprofen (ADVIL,MOTRIN) 800 MG tablet, Take 1 tablet (800 mg total) by mouth 3 (three) times daily as needed for Pain (take with food)., Disp: 60 tablet, Rfl: 0    levothyroxine (SYNTHROID) 75 MCG tablet, Take 1 tablet (75 mcg total) by mouth before breakfast., Disp: 30 tablet, Rfl: 5    azelastine (ASTELIN) 137 mcg (0.1 %) nasal spray, 1 spray (137 mcg total) by Nasal route 2 (two) times daily., Disp: 30 mL, Rfl: 2    HYDROcodone-acetaminophen (NORCO) 5-325 mg per tablet, Take 1 tablet by mouth every 8 (eight) hours as needed for Pain. (Patient not taking: Reported on 7/6/2022), Disp: 9 tablet, Rfl: 0    meclizine (ANTIVERT) 25 mg tablet, Take 1 tablet (25 mg total) by mouth 3 (three) times daily as needed for Dizziness., Disp: 30 tablet, Rfl: 2    tadalafiL (CIALIS) 10 MG tablet, Take 1 tablet (10 mg total) by mouth daily as needed for Erectile Dysfunction., Disp: 30 tablet, Rfl: 2    Review of Systems  "  Constitutional:  Positive for activity change. Negative for appetite change, chills, diaphoresis, fatigue, fever and unexpected weight change.   HENT:  Negative for congestion, ear pain, postnasal drip, rhinorrhea, sinus pressure, sneezing, sore throat and trouble swallowing.    Eyes:  Negative for pain, itching and visual disturbance.   Respiratory:  Negative for cough, chest tightness, shortness of breath and wheezing.    Cardiovascular:  Positive for chest pain (burning with exertion). Negative for palpitations and leg swelling.   Gastrointestinal:  Negative for abdominal distention, abdominal pain, blood in stool, constipation, diarrhea, nausea and vomiting.   Endocrine: Negative for cold intolerance and heat intolerance.   Genitourinary:  Negative for difficulty urinating, dysuria, frequency, hematuria and urgency.   Musculoskeletal:  Positive for arthralgias and neck pain. Negative for back pain and myalgias.   Skin:  Positive for color change. Negative for pallor, rash and wound.   Neurological:  Positive for dizziness. Negative for syncope, weakness, numbness and headaches.   Hematological:  Negative for adenopathy.   Psychiatric/Behavioral:  Negative for behavioral problems. The patient is not nervous/anxious.        Objective:      /82 (BP Location: Right arm, Patient Position: Sitting, BP Method: Large (Manual))   Pulse 102   Temp 99.2 °F (37.3 °C) (Oral)   Resp 16   Ht 5' 9" (1.753 m)   Wt 135.4 kg (298 lb 8.1 oz)   SpO2 95%   BMI 44.08 kg/m²   Physical Exam  Vitals reviewed.   Constitutional:       General: He is not in acute distress.     Appearance: Normal appearance. He is obese. He is not ill-appearing, toxic-appearing or diaphoretic.   HENT:      Head: Normocephalic.      Right Ear: External ear normal.      Left Ear: External ear normal.      Nose: Nose normal. No congestion or rhinorrhea.      Mouth/Throat:      Mouth: Mucous membranes are moist.      Pharynx: Oropharynx is clear. "   Eyes:      General: No scleral icterus.        Right eye: No discharge.         Left eye: No discharge.      Extraocular Movements: Extraocular movements intact.      Conjunctiva/sclera: Conjunctivae normal.   Neck:      Comments: Mass present   Cardiovascular:      Rate and Rhythm: Normal rate and regular rhythm.      Pulses: Normal pulses.      Heart sounds: Normal heart sounds. No murmur heard.     No friction rub. No gallop.   Pulmonary:      Effort: Pulmonary effort is normal. No respiratory distress.      Breath sounds: Normal breath sounds. No wheezing, rhonchi or rales.   Chest:      Chest wall: No tenderness.   Musculoskeletal:         General: No swelling, tenderness or deformity. Normal range of motion.      Cervical back: Normal range of motion.      Right lower leg: No edema.      Left lower leg: No edema.   Skin:     General: Skin is warm and dry.      Capillary Refill: Capillary refill takes less than 2 seconds.      Coloration: Skin is not jaundiced.      Findings: No bruising, erythema, lesion or rash.   Neurological:      Mental Status: He is alert and oriented to person, place, and time.      Gait: Gait normal.   Psychiatric:         Mood and Affect: Mood normal.         Behavior: Behavior normal.         Thought Content: Thought content normal.         Judgment: Judgment normal.         Assessment:       1. Pruritus    2. Benign paroxysmal vertigo, unspecified laterality     3. Warthin's tumor    4. Colon cancer screening    5. Chest pain, unspecified type    6. Prediabetes    7. Morbid obesity with BMI of 40.0-44.9, adult        Plan:       Pruritus  -     Sedimentation rate; Future; Expected date: 11/21/2023  -     C-REACTIVE PROTEIN; Future; Expected date: 11/21/2023    Benign paroxysmal vertigo, unspecified laterality   -     meclizine (ANTIVERT) 25 mg tablet; Take 1 tablet (25 mg total) by mouth 3 (three) times daily as needed for Dizziness.  Dispense: 30 tablet; Refill: 2    Warthin's  tumor  -     US Soft Tissue Head Neck Thyroid; Future; Expected date: 11/21/2023    Colon cancer screening  -     Case Request Endoscopy: COLONOSCOPY    Chest pain, unspecified type  -     Exercise Stress - EKG; Future    Prediabetes        -    Lab today    Morbid obesity with BMI of 40.0-44.9, adult        -     Patient would benefit from healthy diet, exercise and weight loss. Overall health and wellbeing would likely improve.                   Colin Ramirez PA-C  Family Medicine Physician Assistant       Future Appointments       Date Specialty Appt Notes    11/21/2023 Lab ..               I spent a total of 30 minutes on the day of the visit.This includes face to face time and non-face to face time preparing to see the patient (eg, review of tests), obtaining and/or reviewing separately obtained history, documenting clinical information in the electronic or other health record, independently interpreting results and communicating results to the patient/family/caregiver, or care coordinator.      We have addressed [4] Moderate: 1 or more chronic illnesses with exacerbation, progression, or side effects of treatment / 2 or more stable chronic illnesses / 1 undiagnosed new problem with uncertain prognosis / 1 acute illness with systemic symptoms / 1 acute complicated injury  The complexity of the data reviewed and analyzed for this visit was [3] Limited (Reviewed prior external note, ordered unique testing or reviewed the results of each unique test)   The risk of complications and/or morbidity or mortality are [4] Moderate risk (I.e. prescription drug management / decision regarding minor surgery with identified pt or procedure risk factors / decision regarding elective major surgery without identified pt or procedure risk factors / diagnosis or treatment significantly limited by social determinants of health)   The level of Medical Decision Making for this visit is [4] Moderate

## 2023-11-21 NOTE — PATIENT INSTRUCTIONS
Jw Gallagher,     If you are due for any health screening(s) below please notify me so we can arrange them to be ordered and scheduled to maintain your health. Most healthy patients complete it. Don't lose out on improving your health.     Tests to Keep You Healthy    Colon Cancer Screening: DUE  Last Blood Pressure <= 139/89 (11/21/2023): Yes         Colon Cancer Screening    Of cancers affecting both men and women, colorectal cancer is the third leading cancer killer in the [place]. But it doesnt have to be. Screening can prevent colorectal cancer or find it at an early stage when treatment often leads to a cure.    A colonoscopy is the preferred test for detecting colon cancer. It is needed only once every 10 years if results are negative. While sedated, a flexible, lighted tube with a tiny camera is inserted into the rectum and advanced through the colon to look for cancers. An alternative screening test that is used at home and returned to the lab may also be used. It detects hidden blood in bowel movements which could indicate cancer in the colon. If results are positive, you will need a colonoscopy to determine if the blood is a sign of cancer. This type of follow up (diagnostic) colonoscopy usually requires additional copays as required by your insurance provider. Please contact your PCP if you have any questions.

## 2023-11-24 DIAGNOSIS — R70.0 ELEVATED SED RATE: ICD-10-CM

## 2023-11-24 DIAGNOSIS — L29.9 PRURITUS: Primary | ICD-10-CM

## 2023-11-24 RX ORDER — METHYLPREDNISOLONE 4 MG/1
TABLET ORAL
Qty: 21 EACH | Refills: 0 | Status: SHIPPED | OUTPATIENT
Start: 2023-11-24 | End: 2023-12-15

## 2023-11-26 ENCOUNTER — TELEPHONE (OUTPATIENT)
Dept: FAMILY MEDICINE | Facility: CLINIC | Age: 63
End: 2023-11-26
Payer: MEDICARE

## 2023-11-27 ENCOUNTER — HOSPITAL ENCOUNTER (OUTPATIENT)
Dept: CARDIOLOGY | Facility: HOSPITAL | Age: 63
Discharge: HOME | End: 2023-11-27
Payer: MEDICARE

## 2023-11-27 DIAGNOSIS — R07.9 CHEST PAIN, UNSPECIFIED TYPE: ICD-10-CM

## 2023-11-27 LAB
CV STRESS BASE HR: 97 BPM
DIASTOLIC BLOOD PRESSURE: 86 MMHG
OHS CV CPX 1 MINUTE RECOVERY HEART RATE: 151 BPM
OHS CV CPX 85 PERCENT MAX PREDICTED HEART RATE MALE: 135
OHS CV CPX ESTIMATED METS: 5
OHS CV CPX MAX PREDICTED HEART RATE: 159
OHS CV CPX PATIENT IS FEMALE: 0
OHS CV CPX PATIENT IS MALE: 1
OHS CV CPX PEAK DIASTOLIC BLOOD PRESSURE: 92 MMHG
OHS CV CPX PEAK HEAR RATE: 162 BPM
OHS CV CPX PEAK RATE PRESSURE PRODUCT: NORMAL
OHS CV CPX PEAK SYSTOLIC BLOOD PRESSURE: 178 MMHG
OHS CV CPX PERCENT MAX PREDICTED HEART RATE ACHIEVED: 102
OHS CV CPX RATE PRESSURE PRODUCT PRESENTING: NORMAL
STRESS ECHO POST EXERCISE DUR MIN: 2 MINUTES
STRESS ECHO POST EXERCISE DUR SEC: 22 SECONDS
SYSTOLIC BLOOD PRESSURE: 146 MMHG

## 2023-11-27 PROCEDURE — 93016 EXERCISE STRESS - EKG (CUPID ONLY): ICD-10-PCS | Mod: ,,, | Performed by: NURSE PRACTITIONER

## 2023-11-27 PROCEDURE — 93018 EXERCISE STRESS - EKG (CUPID ONLY): ICD-10-PCS | Mod: ,,, | Performed by: INTERNAL MEDICINE

## 2023-11-27 PROCEDURE — 93017 CV STRESS TEST TRACING ONLY: CPT

## 2023-11-27 PROCEDURE — 93018 CV STRESS TEST I&R ONLY: CPT | Mod: ,,, | Performed by: INTERNAL MEDICINE

## 2023-11-27 PROCEDURE — 93016 CV STRESS TEST SUPVJ ONLY: CPT | Mod: ,,, | Performed by: NURSE PRACTITIONER

## 2023-11-27 NOTE — TELEPHONE ENCOUNTER
Patient viewed lab results online on 11-24-23 @ 9:43 am. Prescription for Medrol Dose Pack e-scribed to pharmacy on 11-24-23.

## 2023-11-27 NOTE — TELEPHONE ENCOUNTER
----- Message from Colin Ramirez PA-C sent at 11/24/2023  8:41 AM CST -----  Labs stable with no acute health concerns. Potassium level mildly low. I recommend adding foods high in potassium to diet. He does have mildly elevated inflammatory markers. I'm not sure what's causing this but we can try a short course of steroids to see if it improves his symptoms.    Foods high in Potassium:   Avocado  Bananas  Sweet potatoes  Spinach  Potato  Orange  Navy bean  Yogurt  Potatoes  Dried apricots  Lentils  [place]  Raisins  Tomatoes  Watermelon  Cantaloupe  Coconut water  Jean  Apricots  [place] squash  Prunes  Swiss chard  Broccoli

## 2023-11-28 ENCOUNTER — HOSPITAL ENCOUNTER (OUTPATIENT)
Dept: RADIOLOGY | Facility: HOSPITAL | Age: 63
Discharge: HOME | End: 2023-11-28
Payer: MEDICARE

## 2023-11-28 DIAGNOSIS — D11.9 WARTHIN'S TUMOR: ICD-10-CM

## 2023-11-28 PROCEDURE — 76536 US EXAM OF HEAD AND NECK: CPT | Mod: TC

## 2023-11-29 ENCOUNTER — PATIENT MESSAGE (OUTPATIENT)
Dept: GASTROENTEROLOGY | Facility: CLINIC | Age: 63
End: 2023-11-29
Payer: MEDICARE

## 2024-01-03 ENCOUNTER — TELEPHONE (OUTPATIENT)
Dept: GASTROENTEROLOGY | Facility: CLINIC | Age: 64
End: 2024-01-03
Payer: MEDICARE

## 2024-01-03 DIAGNOSIS — E03.9 HYPOTHYROIDISM, UNSPECIFIED TYPE: ICD-10-CM

## 2024-01-03 RX ORDER — LEVOTHYROXINE SODIUM 75 UG/1
75 TABLET ORAL
Qty: 30 TABLET | Refills: 5 | Status: SHIPPED | OUTPATIENT
Start: 2024-01-03 | End: 2025-01-02

## 2024-01-03 NOTE — TELEPHONE ENCOUNTER
Colonoscopy 3/14 at 8am arrive for 7am instructions reviewed and patient states understanding. Copy to portal.  No GLP-1 medications

## 2024-01-11 DIAGNOSIS — Z00.00 ENCOUNTER FOR MEDICARE ANNUAL WELLNESS EXAM: ICD-10-CM

## 2024-02-27 ENCOUNTER — HOSPITAL ENCOUNTER (EMERGENCY)
Facility: HOSPITAL | Age: 64
Discharge: LEFT BEFORE DISCHARGE | End: 2024-02-27
Attending: EMERGENCY MEDICINE
Payer: MEDICARE

## 2024-02-27 VITALS
HEART RATE: 102 BPM | DIASTOLIC BLOOD PRESSURE: 105 MMHG | TEMPERATURE: 98 F | OXYGEN SATURATION: 98 % | BODY MASS INDEX: 43.69 KG/M2 | SYSTOLIC BLOOD PRESSURE: 175 MMHG | WEIGHT: 295 LBS | RESPIRATION RATE: 20 BRPM | HEIGHT: 69 IN

## 2024-02-27 DIAGNOSIS — R07.9 CHEST PAIN: ICD-10-CM

## 2024-02-27 LAB
ALBUMIN SERPL BCP-MCNC: 4.2 G/DL (ref 3.5–5.2)
ALP SERPL-CCNC: 56 U/L (ref 55–135)
ALT SERPL W/O P-5'-P-CCNC: 18 U/L (ref 10–44)
ANION GAP SERPL CALC-SCNC: 7 MMOL/L (ref 8–16)
AST SERPL-CCNC: 15 U/L (ref 10–40)
BASOPHILS # BLD AUTO: 0.06 K/UL (ref 0–0.2)
BASOPHILS NFR BLD: 0.6 % (ref 0–1.9)
BILIRUB SERPL-MCNC: 0.4 MG/DL (ref 0.1–1)
BNP SERPL-MCNC: 11 PG/ML (ref 0–99)
BUN SERPL-MCNC: 13 MG/DL (ref 8–23)
CALCIUM SERPL-MCNC: 9.2 MG/DL (ref 8.7–10.5)
CHLORIDE SERPL-SCNC: 105 MMOL/L (ref 95–110)
CO2 SERPL-SCNC: 28 MMOL/L (ref 23–29)
CREAT SERPL-MCNC: 1.1 MG/DL (ref 0.5–1.4)
DIFFERENTIAL METHOD BLD: ABNORMAL
EOSINOPHIL # BLD AUTO: 0.1 K/UL (ref 0–0.5)
EOSINOPHIL NFR BLD: 0.9 % (ref 0–8)
ERYTHROCYTE [DISTWIDTH] IN BLOOD BY AUTOMATED COUNT: 13.2 % (ref 11.5–14.5)
EST. GFR  (NO RACE VARIABLE): >60 ML/MIN/1.73 M^2
GLUCOSE SERPL-MCNC: 121 MG/DL (ref 70–110)
HCT VFR BLD AUTO: 43.6 % (ref 40–54)
HGB BLD-MCNC: 14 G/DL (ref 14–18)
IMM GRANULOCYTES # BLD AUTO: 0.06 K/UL (ref 0–0.04)
IMM GRANULOCYTES NFR BLD AUTO: 0.6 % (ref 0–0.5)
LIPASE SERPL-CCNC: 41 U/L (ref 4–60)
LYMPHOCYTES # BLD AUTO: 1.9 K/UL (ref 1–4.8)
LYMPHOCYTES NFR BLD: 20.9 % (ref 18–48)
MAGNESIUM SERPL-MCNC: 1.9 MG/DL (ref 1.6–2.6)
MCH RBC QN AUTO: 27.7 PG (ref 27–31)
MCHC RBC AUTO-ENTMCNC: 32.1 G/DL (ref 32–36)
MCV RBC AUTO: 86 FL (ref 82–98)
MONOCYTES # BLD AUTO: 0.6 K/UL (ref 0.3–1)
MONOCYTES NFR BLD: 6.8 % (ref 4–15)
NEUTROPHILS # BLD AUTO: 6.5 K/UL (ref 1.8–7.7)
NEUTROPHILS NFR BLD: 70.2 % (ref 38–73)
NRBC BLD-RTO: 0 /100 WBC
PLATELET # BLD AUTO: 274 K/UL (ref 150–450)
PMV BLD AUTO: 10 FL (ref 9.2–12.9)
POTASSIUM SERPL-SCNC: 3.7 MMOL/L (ref 3.5–5.1)
PROT SERPL-MCNC: 7.5 G/DL (ref 6–8.4)
RBC # BLD AUTO: 5.06 M/UL (ref 4.6–6.2)
SODIUM SERPL-SCNC: 140 MMOL/L (ref 136–145)
TROPONIN I SERPL HS-MCNC: 5.3 PG/ML (ref 0–14.9)
WBC # BLD AUTO: 9.27 K/UL (ref 3.9–12.7)

## 2024-02-27 PROCEDURE — 80053 COMPREHEN METABOLIC PANEL: CPT | Performed by: EMERGENCY MEDICINE

## 2024-02-27 PROCEDURE — 99281 EMR DPT VST MAYX REQ PHY/QHP: CPT | Mod: 25

## 2024-02-27 PROCEDURE — 93010 ELECTROCARDIOGRAM REPORT: CPT | Mod: ,,, | Performed by: GENERAL PRACTICE

## 2024-02-27 PROCEDURE — 85025 COMPLETE CBC W/AUTO DIFF WBC: CPT | Performed by: EMERGENCY MEDICINE

## 2024-02-27 PROCEDURE — 84484 ASSAY OF TROPONIN QUANT: CPT | Performed by: EMERGENCY MEDICINE

## 2024-02-27 PROCEDURE — 83880 ASSAY OF NATRIURETIC PEPTIDE: CPT | Performed by: EMERGENCY MEDICINE

## 2024-02-27 PROCEDURE — 83690 ASSAY OF LIPASE: CPT | Performed by: EMERGENCY MEDICINE

## 2024-02-27 PROCEDURE — 83735 ASSAY OF MAGNESIUM: CPT | Performed by: EMERGENCY MEDICINE

## 2024-02-27 PROCEDURE — 93005 ELECTROCARDIOGRAM TRACING: CPT | Performed by: GENERAL PRACTICE

## 2024-02-27 NOTE — ED PROVIDER NOTES
Encounter Date: 2/27/2024       History     Chief Complaint   Patient presents with    Flank Pain     63-year-old male presenting via EMS for evaluation of acute left flank pain.  Patient was at his dentist office when he developed severe pain in his left mid back.  The pain did not radiate.  It was a cramping pain.  It was severe.  He had associated diaphoresis.  He denies any shortness a breath or nausea.  The pain has improved spontaneously.  It was currently mild.  He denies any abdominal or chest pain.  He denies similar episodes in the past.    The history is provided by the patient.     Review of patient's allergies indicates:   Allergen Reactions    Codeine     Penicillins      Past Medical History:   Diagnosis Date    Allergy      Past Surgical History:   Procedure Laterality Date    ACHILLES TENDON SURGERY       Family History   Problem Relation Age of Onset    Diabetes Mother     Heart disease Father     Diabetes Maternal Aunt     Diabetes Maternal Uncle     Glaucoma Neg Hx     Lupus Neg Hx     Psoriasis Neg Hx     Melanoma Neg Hx      Social History     Tobacco Use    Smoking status: Former    Smokeless tobacco: Never    Tobacco comments:     quit over 10 years ago   Substance Use Topics    Alcohol use: Yes     Comment: social     Drug use: Yes     Types: Marijuana     Review of Systems   Genitourinary:  Positive for flank pain.   All other systems reviewed and are negative.      Physical Exam     Initial Vitals [02/27/24 1651]   BP Pulse Resp Temp SpO2   (!) 175/105 102 20 98.2 °F (36.8 °C) 98 %      MAP       --         Physical Exam    Nursing note and vitals reviewed.  Constitutional: He appears well-developed and well-nourished. He is not diaphoretic. No distress.   HENT:   Head: Normocephalic and atraumatic.   Eyes: Conjunctivae are normal.   Neck: Neck supple.   Normal range of motion.  Cardiovascular:  Normal rate.           Pulmonary/Chest: No respiratory distress.   Abdominal: Abdomen is soft.  He exhibits no distension. There is no abdominal tenderness.   Musculoskeletal:         General: No edema.      Cervical back: Normal range of motion and neck supple.     Neurological: He is alert. He has normal strength.   Skin: Skin is warm and dry. No rash noted. No erythema.   Psychiatric: He has a normal mood and affect.         ED Course   Procedures  Labs Reviewed   CBC W/ AUTO DIFFERENTIAL - Abnormal; Notable for the following components:       Result Value    Immature Granulocytes 0.6 (*)     Immature Grans (Abs) 0.06 (*)     All other components within normal limits   COMPREHENSIVE METABOLIC PANEL - Abnormal; Notable for the following components:    Glucose 121 (*)     Anion Gap 7 (*)     All other components within normal limits   MAGNESIUM   TROPONIN I HIGH SENSITIVITY   B-TYPE NATRIURETIC PEPTIDE   LIPASE          Imaging Results              X-Ray Chest AP Portable (Final result)  Result time 02/27/24 17:39:46      Final result by Jen Lopez DO (02/27/24 17:39:46)                   Narrative:    PA and lateral chest radiograph: 2/27/2024 5:39 PM CST    Indication: 63 years  old Male with Chest Pain.    Comparison: None available    Findings: The cardiomediastinal silhouette is mildly enlarged.    No pneumothorax is seen.    No acute airspace opacities are seen.    No discrete pleural effusion is apparent.    Multilevel degenerative changes are seen within the thoracic spine.    Impression: No acute airspace opacities are seen.      Electronically signed by:  Jen Lopez DO  02/27/2024 05:39 PM CST Workstation: [id number]                                     CT Abdomen Pelvis  Without Contrast (Final result)  Result time 02/27/24 17:34:35      Final result by Jen Lopez DO (02/27/24 17:34:35)                   Narrative:    CT ABDOMEN AND PELVIS WITHOUT INTRAVENOUS CONTRAST: 2/27/2024 5:24 PM CST    HISTORY: 63 years  old Male with Flank pain, kidney stone suspected.    COMPARISON:  None available    TECHNIQUE: Axial contiguous images were obtained from the lung bases to the proximal femurs without intravenous contrast administered. Sagittal and coronal reconstructions were also obtained and reviewed. This exam was performed according to our departmental dose-optimization program, which includes automated exposure control, adjustment of the mA and/or KV according to the patient's size and/or use of iterative reconstruction technique.    FINDINGS:  Evaluation of the upper abdomen is limited by the lack of intravenous contrast.    LUNG BASES: No focal consolidative airspace opacities are seen.  No discrete pleural effusions are seen.    LIVER: The visualized hepatic parenchyma demonstrates no focal abnormality.    GALLBLADDER: The gallbladder demonstrates no evidence of calcified gallstones.    SPLEEN: The spleen is normal in size.    PANCREAS: The pancreas is unremarkable.    ADRENAL GLANDS: The bilateral adrenal glands are unremarkable.    KIDNEYS: Both kidneys demonstrate no hydronephrosis. No renal or ureteral calculi are seen. There is a cyst seen at the superior pole the left kidney measuring up to 5.3 cm.    PELVIS: The urinary bladder is mildly distended. There is fat noted within the bilateral inguinal canals.    STOMACH: The stomach is not well distended.    BOWEL: The small bowel loops appear unremarkable. No pericolonic inflammatory stranding is seen. There are multiple colonic diverticula without evidence to suggest acute diverticulitis.    APPENDIX: The appendix is unremarkable.    PERITONEUM: There is no evidence of pneumoperitoneum or free fluid.    VESSELS: The IVC is unremarkable. The abdominal aorta is within normal limits of size.    LYMPH NODES: No significantly enlarged lymph nodes are seen in the abdomen or pelvis.    BONES AND SOFT TISSUES: No acute osseous abnormality is seen.    IMPRESSION: No acute process is seen within the abdomen or pelvis.      Electronically signed  by:  Jen Lopez   02/27/2024 05:34 PM CST Workstation: [id number]                                     Medications - No data to display  Medical Decision Making  63-year-old with acute flank pain and diaphoresis.  EKG shows no ischemic changes.  CXR is clear.  Blood counts, electrolytes, renal function, LFTs/lipase, and initial troponin is normal.  CT abdomen negative for acute process.  Patient refused repeat troponin and urinalysis, says he feels normal and wants to leave.  He understands risks of leaving and will sign out AMA.    Amount and/or Complexity of Data Reviewed  Labs: ordered.  Radiology: ordered.                                      Clinical Impression:  Final diagnoses:  [R07.9] Flank pain          ED Disposition Condition    Vamshi Badillo MD  02/27/24 9995

## 2024-03-14 ENCOUNTER — ANESTHESIA EVENT (OUTPATIENT)
Dept: ENDOSCOPY | Facility: HOSPITAL | Age: 64
End: 2024-03-14
Payer: MEDICARE

## 2024-03-14 ENCOUNTER — TELEPHONE (OUTPATIENT)
Dept: SURGERY | Facility: CLINIC | Age: 64
End: 2024-03-14
Payer: MEDICARE

## 2024-03-14 ENCOUNTER — HOSPITAL ENCOUNTER (OUTPATIENT)
Facility: HOSPITAL | Age: 64
Discharge: HOME | End: 2024-03-14
Attending: INTERNAL MEDICINE | Admitting: INTERNAL MEDICINE
Payer: MEDICARE

## 2024-03-14 ENCOUNTER — ANESTHESIA (OUTPATIENT)
Dept: ENDOSCOPY | Facility: HOSPITAL | Age: 64
End: 2024-03-14
Payer: MEDICARE

## 2024-03-14 ENCOUNTER — TELEPHONE (OUTPATIENT)
Dept: GASTROENTEROLOGY | Facility: CLINIC | Age: 64
End: 2024-03-14
Payer: MEDICARE

## 2024-03-14 VITALS
SYSTOLIC BLOOD PRESSURE: 153 MMHG | HEART RATE: 79 BPM | TEMPERATURE: 98 F | BODY MASS INDEX: 47.74 KG/M2 | OXYGEN SATURATION: 98 % | WEIGHT: 315 LBS | DIASTOLIC BLOOD PRESSURE: 86 MMHG | RESPIRATION RATE: 18 BRPM | HEIGHT: 68 IN

## 2024-03-14 DIAGNOSIS — Z12.11 SCREENING FOR COLON CANCER: ICD-10-CM

## 2024-03-14 DIAGNOSIS — K63.89 COLONIC MASS: Primary | ICD-10-CM

## 2024-03-14 LAB — CEA SERPL-MCNC: 2.4 NG/ML (ref 0–5)

## 2024-03-14 PROCEDURE — 36415 COLL VENOUS BLD VENIPUNCTURE: CPT | Performed by: INTERNAL MEDICINE

## 2024-03-14 PROCEDURE — 63600175 PHARM REV CODE 636 W HCPCS: Performed by: NURSE ANESTHETIST, CERTIFIED REGISTERED

## 2024-03-14 PROCEDURE — 37000008 HC ANESTHESIA 1ST 15 MINUTES: Performed by: INTERNAL MEDICINE

## 2024-03-14 PROCEDURE — 45380 COLONOSCOPY AND BIOPSY: CPT | Mod: PT | Performed by: INTERNAL MEDICINE

## 2024-03-14 PROCEDURE — 82378 CARCINOEMBRYONIC ANTIGEN: CPT | Performed by: INTERNAL MEDICINE

## 2024-03-14 PROCEDURE — 88305 TISSUE EXAM BY PATHOLOGIST: CPT | Mod: TC,59 | Performed by: PATHOLOGY

## 2024-03-14 PROCEDURE — 27201028 HC NEEDLE, SCLERO: Performed by: INTERNAL MEDICINE

## 2024-03-14 PROCEDURE — 45381 COLONOSCOPY SUBMUCOUS NJX: CPT | Mod: PT,59 | Performed by: INTERNAL MEDICINE

## 2024-03-14 PROCEDURE — 37000009 HC ANESTHESIA EA ADD 15 MINS: Performed by: INTERNAL MEDICINE

## 2024-03-14 PROCEDURE — 27201012 HC FORCEPS, HOT/COLD, DISP: Performed by: INTERNAL MEDICINE

## 2024-03-14 PROCEDURE — 45380 COLONOSCOPY AND BIOPSY: CPT | Mod: PT,,, | Performed by: INTERNAL MEDICINE

## 2024-03-14 PROCEDURE — 45381 COLONOSCOPY SUBMUCOUS NJX: CPT | Mod: PT,51,, | Performed by: INTERNAL MEDICINE

## 2024-03-14 PROCEDURE — 25000003 PHARM REV CODE 250: Performed by: NURSE ANESTHETIST, CERTIFIED REGISTERED

## 2024-03-14 RX ORDER — LIDOCAINE HYDROCHLORIDE 20 MG/ML
INJECTION, SOLUTION EPIDURAL; INFILTRATION; INTRACAUDAL; PERINEURAL
Status: DISCONTINUED | OUTPATIENT
Start: 2024-03-14 | End: 2024-03-14

## 2024-03-14 RX ORDER — SODIUM CHLORIDE 9 MG/ML
INJECTION, SOLUTION INTRAVENOUS CONTINUOUS
Status: DISCONTINUED | OUTPATIENT
Start: 2024-03-14 | End: 2024-03-14 | Stop reason: HOSPADM

## 2024-03-14 RX ORDER — PROPOFOL 10 MG/ML
VIAL (ML) INTRAVENOUS
Status: DISCONTINUED | OUTPATIENT
Start: 2024-03-14 | End: 2024-03-14

## 2024-03-14 RX ADMIN — PROPOFOL 30 MG: 10 INJECTION, EMULSION INTRAVENOUS at 12:03

## 2024-03-14 RX ADMIN — PROPOFOL 50 MG: 10 INJECTION, EMULSION INTRAVENOUS at 12:03

## 2024-03-14 RX ADMIN — LIDOCAINE HYDROCHLORIDE 40 MG: 20 INJECTION, SOLUTION EPIDURAL; INFILTRATION; INTRACAUDAL; PERINEURAL at 12:03

## 2024-03-14 NOTE — TELEPHONE ENCOUNTER
----- Message from Andre Juan MD sent at 3/14/2024 12:51 PM CDT -----  Referral to [facility] for colon mass

## 2024-03-14 NOTE — TRANSFER OF CARE
"Anesthesia Transfer of Care Note    Patient: Elliott Ayala    Procedure(s) Performed: Procedure(s) (LRB):  COLONOSCOPY (N/A)    Patient location: PACU    Anesthesia Type: general    Transport from OR: Transported from OR on room air with adequate spontaneous ventilation    Post pain: adequate analgesia    Post assessment: no apparent anesthetic complications    Post vital signs: stable    Level of consciousness: awake, alert and oriented    Nausea/Vomiting: no nausea/vomiting    Complications: none    Transfer of care protocol was followed      Last vitals: Visit Vitals  BP (!) 144/81 (BP Location: Left arm, Patient Position: Lying)   Pulse 88   Temp 36.7 °C (98.1 °F) (Skin)   Resp 18   Ht 5' 8" (1.727 m)   Wt (!) 145.2 kg (320 lb)   SpO2 (!) 93%   BMI 48.66 kg/m²     "

## 2024-03-14 NOTE — H&P
CC: Screening for colorectal cancer - last scope greater than 10 years ago    63 year old male with above. States that symptoms are absent, no alleviating/exacerbating factors. No family history of colorectal CA. No personal history of polyps. No bleeding or weight loss.     ROS:  No headache, no fever/chills, no chest pain/SOB, no nausea/vomiting/diarrhea/constipation/GI bleeding/abdominal pain, no dysuria/hematuria.    VSSAF   Exam:   Alert and oriented x 3; no apparent distress   PERRLA, sclera anicteric  CV: Regular rate/rhythm, normal PMI   Lungs: Clear bilaterally with no wheeze/rales   Abdomen: Soft, NT/ND, normal bowel sounds   Ext: No cyanosis, clubbing     Impression:   As above    Plan:   Proceed with endoscopy. Further recs to follow.

## 2024-03-14 NOTE — ANESTHESIA POSTPROCEDURE EVALUATION
Anesthesia Post Evaluation    Patient: Elliott Ayala    Procedure(s) Performed: Procedure(s) (LRB):  COLONOSCOPY (N/A)    Final Anesthesia Type: general      Patient location during evaluation: PACU  Patient participation: Yes- Able to Participate  Level of consciousness: awake and alert  Post-procedure vital signs: reviewed and stable  Pain management: adequate  Airway patency: patent    PONV status at discharge: No PONV  Anesthetic complications: no      Cardiovascular status: blood pressure returned to baseline and hypertensive  Respiratory status: unassisted  Hydration status: euvolemic  Follow-up not needed.              Vitals Value Taken Time   /86 03/14/24 1310   Temp 36.7 °C (98.1 °F) 03/14/24 1249   Pulse 79 03/14/24 1310   Resp 18 03/14/24 1310   SpO2 98 % 03/14/24 1310         Event Time   Out of Recovery 13:21:22         Pain/Conchita Score: Conchita Score: 10 (3/14/2024  1:20 PM)

## 2024-03-14 NOTE — PROVATION PATIENT INSTRUCTIONS
Discharge Summary/Instructions after an Endoscopic Procedure  Patient Name: Elliott Ayala  Patient MRN: [id number]  Patient YOB: 1960 Thursday, March 14, 2024  Andre Gongora MD  Dear patient,  As a result of recent federal legislation (The Federal Cures Act), you may   receive lab or pathology results from your procedure in your [facility]   account before your physician is able to contact you. Your physician or   their representative will relay the results to you with their   recommendations at their soonest availability.  Thank you,  RESTRICTIONS:  During your procedure today, you received medications for sedation.  These   medications may affect your judgment, balance and coordination.  Therefore,   for 24 hours, you have the following restrictions:   - DO NOT drive a car, operate machinery, make legal/financial decisions,   sign important papers or drink alcohol.    ACTIVITY:  Today: no heavy lifting, straining or running due to procedural   sedation/anesthesia.  The following day: return to full activity including work.  DIET:  Eat and drink normally unless instructed otherwise.     TREATMENT FOR COMMON SIDE EFFECTS:  - Mild abdominal pain, nausea, belching, bloating or excessive gas:  rest,   eat lightly and use a heating pad.  - Sore Throat: treat with throat lozenges and/or gargle with warm salt   water.  - Because air was used during the procedure, expelling large amounts of air   from your rectum or belching is normal.  - If a bowel prep was taken, you may not have a bowel movement for 1-3 days.    This is normal.  SYMPTOMS TO WATCH FOR AND REPORT TO YOUR PHYSICIAN:  1. Abdominal pain or bloating, other than gas cramps.  2. Chest pain.  3. Back pain.  4. Signs of infection such as: chills or fever occurring within 24 hours   after the procedure.  5. Rectal bleeding, which would show as bright red, maroon, or black stools.   (A tablespoon of blood from the rectum is not serious, especially if    hemorrhoids are present.)  6. Vomiting.  7. Weakness or dizziness.  GO DIRECTLY TO THE NEAREST EMERGENCY ROOM IF YOU HAVE ANY OF THE FOLLOWING:      Difficulty breathing              Chills and/or fever over 101 F   Persistent vomiting and/or vomiting blood   Severe abdominal pain   Severe chest pain   Black, tarry stools   Bleeding- more than one tablespoon   Any other symptom or condition that you feel may need urgent attention  Your doctor recommends these additional instructions:  If any biopsies were taken, your doctors clinic will contact you in 1 to 2   weeks with any results.  - Patient has a contact number available for emergencies.  The signs and   symptoms of potential delayed complications were discussed with the   patient.  Return to normal activities tomorrow.  Written discharge   instructions were provided to the patient.   - High fiber diet.   - Continue present medications.   - Await pathology results.   - Repeat colonoscopy (date not yet determined) for surveillance.   - Discharge patient to home (ambulatory).   - Perform a CT scan (computed tomography) of abdomen with contrast and   pelvis with contrast at appointment to be scheduled.   - Check CEA today.   - Refer to a surgeon at appointment to be scheduled.   - Return to GI office after studies are complete.  For questions, problems or results please call your physician - Andre Gongora MD at Work:  [phone].  [facility], EMERGENCY ROOM PHONE NUMBER: [phone]  IF A COMPLICATION OR EMERGENCY SITUATION ARISES AND YOU ARE UNABLE TO REACH   YOUR PHYSICIAN - GO DIRECTLY TO THE EMERGENCY ROOM.  Andre Gongora MD  3/14/2024 12:46:38 PM  This report has been verified and signed electronically.  Dear patient,  As a result of recent federal legislation (The Federal Cures Act), you may   receive lab or pathology results from your procedure in your [facility]   account before your physician is able to contact you. Your physician or    their representative will relay the results to you with their   recommendations at their soonest availability.  Thank you,  PROVATION

## 2024-03-14 NOTE — PLAN OF CARE
Vss, jaime po fluids, denies pain, ambulates easily. IV removed, catheter intact. Discharge instructions provided and states understanding. States ready to go home.  Discharged from facility with family per wheelchair.

## 2024-03-14 NOTE — ANESTHESIA PREPROCEDURE EVALUATION
03/14/2024  Elliott Ayala is a 63 y.o., male.      Pre-op Assessment    I have reviewed the Patient Summary Reports.     I have reviewed the Nursing Notes. I have reviewed the NPO Status.   I have reviewed the Medications.     Review of Systems  Anesthesia Hx:             Denies Family Hx of Anesthesia complications.    Denies Personal Hx of Anesthesia complications.                    Pulmonary:        Sleep Apnea, CPAP                Hepatic/GI:  Bowel Prep.                Neurological:      Headaches                                 Endocrine:   Hypothyroidism        Morbid Obesity / BMI > 40      Physical Exam  General: Cooperative, Alert and Oriented    Airway:  Mallampati: III   Neck: Girth Increased        Anesthesia Plan  Type of Anesthesia, risks & benefits discussed:    Anesthesia Type: Gen Natural Airway  Intra-op Monitoring Plan: Standard ASA Monitors  Induction:  IV  Informed Consent: Informed consent signed with the Patient and all parties understand the risks and agree with anesthesia plan.  All questions answered.   ASA Score: 3    Ready For Surgery From Anesthesia Perspective.     .

## 2024-03-14 NOTE — TELEPHONE ENCOUNTER
I called patient and scheduled him on Tuesday, 3/19/24 @ 2:15pm in [place] for a Colonic mass per Dr. Juan.  Sae

## 2024-03-17 LAB
OHS QRS DURATION: 86 MS
OHS QTC CALCULATION: 462 MS

## 2024-03-18 NOTE — PROGRESS NOTES
Please advise patient that biopsies did indeed show cancer of the colon as we discussed.  Recommend CT scan and referral to colorectal surgery as planned.

## 2024-03-19 ENCOUNTER — OFFICE VISIT (OUTPATIENT)
Dept: SURGERY | Facility: CLINIC | Age: 64
End: 2024-03-19
Payer: MEDICARE

## 2024-03-19 VITALS
SYSTOLIC BLOOD PRESSURE: 146 MMHG | HEART RATE: 84 BPM | BODY MASS INDEX: 47.74 KG/M2 | DIASTOLIC BLOOD PRESSURE: 78 MMHG | HEIGHT: 68 IN | WEIGHT: 315 LBS | TEMPERATURE: 99 F

## 2024-03-19 DIAGNOSIS — K63.89 COLONIC MASS: ICD-10-CM

## 2024-03-19 PROCEDURE — 99214 OFFICE O/P EST MOD 30 MIN: CPT | Mod: PBBFAC,PN | Performed by: SURGERY

## 2024-03-19 PROCEDURE — 99205 OFFICE O/P NEW HI 60 MIN: CPT | Mod: S$PBB,,, | Performed by: SURGERY

## 2024-03-19 PROCEDURE — 99999 PR PBB SHADOW E&M-EST. PATIENT-LVL IV: CPT | Mod: PBBFAC,,, | Performed by: SURGERY

## 2024-03-19 NOTE — PATIENT INSTRUCTIONS
Surgery is scheduled for 4/11/24 arrival time will be given by the the preop nurse.  You may receive two calls from the Preop Nurses one a few days before surgery the second the day before surgery with the arrival time.  The preop nurse will call you from [phone]  Nothing to eat or drink after midnight.  Someone to drive you home if you are same day surgery.    THE PREOP NURSE WILL CALL, SOMETIMES AS LATE AS 4 or 5 PM IN THE AFTERNOON THE DAY BEFORE SURGERY.    Shower the night before and the morning of your procedure with Chlorhexidine Surgical Scrub also known as Hibiclens , can be purchased at most Pharmacy's no prescription needed.    Special Instruction:     Your procedure/surgery is scheduled at the [facility] at [address] [place].     Bowel Prep for Colonoscopy/Surgery  Purchase:  Dulcolax Pills (Laxative) 4 tablets  14 dose bottle of Miralax Powder  64 ounces of Gatorade or Powerade    The day before your procedure no solid foods, clear liquids only to include water, Gatorade,Powerade.  Coffee no creamer  Soft Drinks  Popsicles   Jello   Mervin Aid  Chicken or beef broth  Apple juice, white grape juice, Tea,   Hard Candy    NO RED OR PURPLE COLORED LIQUIDS, JELLO, POPSCILES.  Try to Avoid these foods 7 days before your Procedure:  beans, peas, corn, nuts, popcorn and tomatoes.  Try not to use Advil or Ibuprofen, Non-Steroidal Anti-inflammatories such as Aleve for 7 days before your colonoscopy, no fish oil for 5 days before the procedure.    Mix the entire 14 dose bottle of Miralax into  64 ounces of Gatorade into a large pitcher, stir and chill until ready to use.  The day before procedure starting at noon take all 4 Dulcolax tablets followed by clear liquids until 2pm.  Keep in mind to drink plenty of fluids this is how the laxatives work, plenty fluids.  After 2pm no later than 4pm start drinkg the Miralax/Gatorade mix 8 ounces  at a time over a 2-4 hour period until completing the entire 64 ounces, one glass every 15 to 30 minutes.  Keep sipping clear liquids between each dose  Contact your Diabetes doctor or Endocrinologist for detailed instruction regarding your insulin or oral diabetes medication.     Contact Dr. Bay if you have any questions, [phone]    Be Sure to Notify Your Doctor if You are on a Prescription Blood Thinner.    Contact Rishi Burciaga LPN for questions are concerns. [phone]

## 2024-03-19 NOTE — PROGRESS NOTES
Subjective     Patient ID: Elliott Ayala is a 63 y.o. male.    Chief Complaint: Consult (Colonic mass)    HPI  this is a pleasant 63-year-old gentleman who was referred to me in consultation from Dr. Juan for evaluation of a colon mass.  Patient had recent screening colonoscopy performed was noted to have a firm mass within the proceed descending colon.  Biopsies were taken and confirmed to me adenocarcinoma.  This was an MMR proficient tumor.  He was referred to me for evaluation.  He is yet to have a CT scan.  He was CEA level was 2.4.  Denies any bleeding.  He has no significant abdominal surgical history.  His past medical history significant for morbid obesity  Review of Systems   Constitutional:  Negative for activity change and appetite change.   Respiratory:  Negative for apnea.    Cardiovascular:  Negative for chest pain.   Gastrointestinal:  Negative for abdominal distention, abdominal pain, nausea and vomiting.   Hematological:  Negative for adenopathy. Does not bruise/bleed easily.   Psychiatric/Behavioral:  Negative for agitation.           Objective     Physical Exam  Vitals reviewed.   Cardiovascular:      Rate and Rhythm: Normal rate.      Pulses: Normal pulses.   Pulmonary:      Effort: Pulmonary effort is normal.   Abdominal:      General: Abdomen is flat. There is no distension.      Tenderness: There is no abdominal tenderness.      Hernia: No hernia is present.   Skin:     General: Skin is warm.      Coloration: Skin is not jaundiced.      Findings: No bruising or erythema.   Neurological:      Mental Status: He is alert.   Psychiatric:         Mood and Affect: Mood normal.     Path:         Path:    1. DESCENDING COLON MASS, BIOPSY:   - INVASIVE, WELL-DIFFERENTIATED COLONIC ADENOCARCINOMA (SEE COMMENT)     2. SIGMOID COLON POLYP, BIOPSY:   - HYPERPLASTIC POLYP       Immunohistochemical (IHC) testing for Mismatch repair (MMR) proteins      MLH-1: Intact Nuclear expression    MSH-2: Intact  Nuclear expression    MSH-6: Intact Nuclear expression    PMS-2: Intact Nuclear expression       Assessment and Plan     1. Colonic mass  -     Ambulatory referral/consult to General Surgery          Discussion with patient.  Explained to patient he does have colon cancer presumptively within the descending colon.  Explained to him staging criteria.  Will need to evaluate CT scan of the chest abdomen and pelvis to ensure there is no evidence of metastatic disease.  Assuming there has no evidence of metastatic disease would proceed with surgical colectomy.  Exact surgery with dependent the location of the tumor which would be identified intraoperatively.  Expressed to patient he would be in the hospital 1-3 days after surgery perhaps longer.  Explained to him risks of bleeding, infection, anastomotic leak, hernia and potential for an ostomy.  Informed consent was obtained.  Surgery is tentatively been scheduled for Thursday April 11th.  Informed consent has been obtained.         No follow-ups on file.

## 2024-03-20 RX ORDER — CIPROFLOXACIN 2 MG/ML
400 INJECTION, SOLUTION INTRAVENOUS
Status: CANCELLED | OUTPATIENT
Start: 2024-03-20

## 2024-03-20 RX ORDER — SODIUM CHLORIDE 9 MG/ML
INJECTION, SOLUTION INTRAVENOUS CONTINUOUS
Status: CANCELLED | OUTPATIENT
Start: 2024-03-20

## 2024-03-20 RX ORDER — METRONIDAZOLE 500 MG/100ML
500 INJECTION, SOLUTION INTRAVENOUS
Status: CANCELLED | OUTPATIENT
Start: 2024-03-20

## 2024-03-26 ENCOUNTER — HOSPITAL ENCOUNTER (OUTPATIENT)
Dept: RADIOLOGY | Facility: HOSPITAL | Age: 64
Discharge: HOME | End: 2024-03-26
Attending: INTERNAL MEDICINE
Payer: MEDICARE

## 2024-03-26 DIAGNOSIS — K63.89 COLONIC MASS: ICD-10-CM

## 2024-03-26 PROCEDURE — 71260 CT THORAX DX C+: CPT | Mod: 26,,, | Performed by: RADIOLOGY

## 2024-03-26 PROCEDURE — 74177 CT ABD & PELVIS W/CONTRAST: CPT | Mod: TC

## 2024-03-26 PROCEDURE — 74177 CT ABD & PELVIS W/CONTRAST: CPT | Mod: 26,,, | Performed by: RADIOLOGY

## 2024-03-26 PROCEDURE — 25500020 PHARM REV CODE 255

## 2024-03-26 PROCEDURE — A9698 NON-RAD CONTRAST MATERIALNOC: HCPCS

## 2024-03-26 RX ADMIN — IOHEXOL 100 ML: 350 INJECTION, SOLUTION INTRAVENOUS at 12:03

## 2024-03-26 RX ADMIN — IOHEXOL 1000 ML: 12 SOLUTION ORAL at 12:03

## 2024-03-27 NOTE — PROGRESS NOTES
Please advise patient that CT scan showed some enlarged lymph nodes in the area of the colon tumor as well as some indeterminate findings in the lungs/vertebrae.  He will need referral to oncology for further evaluation and to see Dr. Bay as planned if not already done to determine if any further workup of this needed prior to surgery.

## 2024-04-01 DIAGNOSIS — R93.89 ABNORMAL CT SCAN: ICD-10-CM

## 2024-04-01 DIAGNOSIS — K63.89 COLONIC MASS: Primary | ICD-10-CM

## 2024-04-02 ENCOUNTER — TELEPHONE (OUTPATIENT)
Dept: SURGERY | Facility: CLINIC | Age: 64
End: 2024-04-02
Payer: MEDICARE

## 2024-04-02 NOTE — TELEPHONE ENCOUNTER
----- Message from Vanessa Silveira, Patient Care Assistant sent at 4/2/2024  8:20 AM CDT -----  Type: Needs Medical Advice  Who Called:  teri  Moreno Call Back Number: [phone]    Additional Information: teri states he needs to cancel his upcoming surgery with [facility] , this is his 2nd time reaching out to cancel and no one has gotten back in touch with him, please call to further discuss thank you .

## 2024-04-08 ENCOUNTER — TELEPHONE (OUTPATIENT)
Dept: HEMATOLOGY/ONCOLOGY | Facility: CLINIC | Age: 64
End: 2024-04-08
Payer: MEDICARE

## 2024-04-08 NOTE — NURSING
Called patient regarding referral to Oncology no answer left message on VM to call back   Oncology Navigation   Intake  Cancer Type: GI  Type of Referral: Internal  Date of Referral: 04/01/24  Date Worked: 04/08/24     Treatment                              Acuity      Follow Up  No follow-ups on file.

## 2024-04-10 ENCOUNTER — TELEPHONE (OUTPATIENT)
Dept: HEMATOLOGY/ONCOLOGY | Facility: CLINIC | Age: 64
End: 2024-04-10
Payer: MEDICARE

## 2024-04-10 NOTE — NURSING
Called patient regarding referral to Oncology no answer left message on VM to call back   Oncology Navigation   Intake  Cancer Type: GI  Type of Referral: Internal  Date of Referral: 04/01/24  Date Worked: 04/10/24     Treatment                              Acuity      Follow Up  No follow-ups on file.

## 2024-04-11 ENCOUNTER — TELEPHONE (OUTPATIENT)
Dept: HEMATOLOGY/ONCOLOGY | Facility: CLINIC | Age: 64
End: 2024-04-11
Payer: MEDICARE

## 2024-04-11 NOTE — NURSING
Called patient regarding referral to Oncology no answer left message on VM to call back   Oncology Navigation   Intake  Cancer Type: GI  Type of Referral: Internal  Date of Referral: 04/01/24  Date Worked: 04/11/24     Treatment                              Acuity      Follow Up  No follow-ups on file.       
Statement Selected

## 2024-04-12 ENCOUNTER — TELEPHONE (OUTPATIENT)
Dept: HEMATOLOGY/ONCOLOGY | Facility: CLINIC | Age: 64
End: 2024-04-12
Payer: MEDICARE

## 2024-04-12 NOTE — NURSING
Spoke to Mr Ayala regarding referral to Oncology patient stated he would like to call back after he meets with his PCP he was given my direct contact information he verbalized understanding   Oncology Navigation   Intake  Cancer Type: -- (Colonic Mass)  Type of Referral: Internal  Date of Referral: 04/01/24  Initial Nurse Navigator Contact: 04/12/24  Date Worked: 04/12/24     Treatment                              Acuity      Follow Up  Follow up in about 5 days (around 4/17/2024) for status of PCP appt .

## 2024-04-23 ENCOUNTER — TELEPHONE (OUTPATIENT)
Dept: FAMILY MEDICINE | Facility: CLINIC | Age: 64
End: 2024-04-23
Payer: MEDICARE

## 2024-04-23 NOTE — TELEPHONE ENCOUNTER
Spoke to RN Vinita Peres (Hematology Oncology Department) regarding attached patient.  Tae Ja advised patient declined to schedule appointment with hematology oncology until he speaks to LOGAN Ramirez at upcoming office visit on 4-29-24.  Patient also canceled upcoming surgery with Dr. Bay as he advised he would like to speak to LOGAN Ramirez first.  Patient has a recent diagnosis of cancer. Please review recent encounters with hematology oncology and general surgery and advise.  Thank you.      Writer also spoke to LOGAN Ramirez verbally regarding this matter.

## 2024-04-29 ENCOUNTER — OFFICE VISIT (OUTPATIENT)
Dept: FAMILY MEDICINE | Facility: CLINIC | Age: 64
End: 2024-04-29
Payer: MEDICARE

## 2024-04-29 VITALS
HEART RATE: 89 BPM | WEIGHT: 297.81 LBS | OXYGEN SATURATION: 97 % | BODY MASS INDEX: 45.13 KG/M2 | TEMPERATURE: 98 F | RESPIRATION RATE: 17 BRPM | DIASTOLIC BLOOD PRESSURE: 88 MMHG | SYSTOLIC BLOOD PRESSURE: 132 MMHG | HEIGHT: 68 IN

## 2024-04-29 DIAGNOSIS — C18.9 COLON CANCER METASTASIZED TO MESENTERIC LYMPH NODES: Primary | ICD-10-CM

## 2024-04-29 DIAGNOSIS — E66.01 MORBID OBESITY WITH BMI OF 45.0-49.9, ADULT: ICD-10-CM

## 2024-04-29 DIAGNOSIS — C77.2 COLON CANCER METASTASIZED TO MESENTERIC LYMPH NODES: Primary | ICD-10-CM

## 2024-04-29 PROCEDURE — 99214 OFFICE O/P EST MOD 30 MIN: CPT | Mod: S$PBB,,,

## 2024-04-29 PROCEDURE — 99999 PR PBB SHADOW E&M-EST. PATIENT-LVL IV: CPT | Mod: PBBFAC,,,

## 2024-04-29 PROCEDURE — 99214 OFFICE O/P EST MOD 30 MIN: CPT | Mod: PBBFAC,PO

## 2024-04-29 NOTE — PATIENT INSTRUCTIONS
Jw Gallagher,     If you are due for any health screening(s) below please notify me so we can arrange them to be ordered and scheduled to maintain your health. Most healthy patients complete it. Don't lose out on improving your health.     All of your core healthy metrics are met.

## 2024-04-29 NOTE — PROGRESS NOTES
Subjective:       Patient ID: Elliott Ayala is a 64 y.o. male.    Chief Complaint: Results    Presents to the clinic to review recent imaging results.     We looked at his recent CT scan and reviewed the results in detail. Informed him that as we already knew he has colon cancer, however the CT results suggest that it has potentially metastasized to local lymph nodes. There are also potential findings of the lungs and bones as well but further imaging is required to fully investigate. He wonders where should he go from this point. They were discussing surgery, however he is very hesitant to do this as he is not interested in having a colostomy at any point. He was to see oncology but states he preffered to talk to me first before pursuing any next steps. I explained that seeing oncology is the best next step to discuss results and discuss the next possible steps to investigate or treat.         Past Medical History:   Diagnosis Date    Allergy     Hypothyroidism, unspecified     Sleep apnea     Thyroid disease        Review of patient's allergies indicates:   Allergen Reactions    Codeine     Penicillins          Current Outpatient Medications:     levothyroxine (SYNTHROID) 75 MCG tablet, Take 1 tablet (75 mcg total) by mouth before breakfast., Disp: 30 tablet, Rfl: 5    azelastine (ASTELIN) 137 mcg (0.1 %) nasal spray, 1 spray (137 mcg total) by Nasal route 2 (two) times daily. (Patient not taking: Reported on 3/19/2024), Disp: 30 mL, Rfl: 2    HYDROcodone-acetaminophen (NORCO) 5-325 mg per tablet, Take 1 tablet by mouth every 8 (eight) hours as needed for Pain. (Patient not taking: Reported on 7/6/2022), Disp: 9 tablet, Rfl: 0    ibuprofen (ADVIL,MOTRIN) 800 MG tablet, Take 1 tablet (800 mg total) by mouth 3 (three) times daily as needed for Pain (take with food). (Patient not taking: Reported on 3/19/2024), Disp: 60 tablet, Rfl: 0    meclizine (ANTIVERT) 25 mg tablet, Take 1 tablet (25 mg total) by mouth 3  "(three) times daily as needed for Dizziness. (Patient not taking: Reported on 3/19/2024), Disp: 30 tablet, Rfl: 2    tadalafiL (CIALIS) 10 MG tablet, Take 1 tablet (10 mg total) by mouth daily as needed for Erectile Dysfunction. (Patient not taking: Reported on 3/19/2024), Disp: 30 tablet, Rfl: 2    Review of Systems   Genitourinary:  Positive for flank pain.   Musculoskeletal:  Positive for back pain.       Objective:      /88 (BP Location: Right arm, Patient Position: Sitting, BP Method: Large (Manual))   Pulse 89   Temp 98.4 °F (36.9 °C) (Oral)   Resp 17   Ht 5' 8" (1.727 m)   Wt 135.1 kg (297 lb 13.5 oz)   SpO2 97%   BMI 45.29 kg/m²   Physical Exam  Vitals reviewed.   Constitutional:       General: He is not in acute distress.     Appearance: Normal appearance. He is obese. He is not ill-appearing, toxic-appearing or diaphoretic.   HENT:      Head: Normocephalic.      Right Ear: External ear normal.      Left Ear: External ear normal.      Nose: Nose normal. No congestion or rhinorrhea.      Mouth/Throat:      Mouth: Mucous membranes are moist.      Pharynx: Oropharynx is clear.   Eyes:      General: No scleral icterus.        Right eye: No discharge.         Left eye: No discharge.      Extraocular Movements: Extraocular movements intact.      Conjunctiva/sclera: Conjunctivae normal.   Cardiovascular:      Rate and Rhythm: Normal rate.   Pulmonary:      Effort: Pulmonary effort is normal. No respiratory distress.      Breath sounds: Normal breath sounds.   Musculoskeletal:         General: No swelling, tenderness or deformity. Normal range of motion.      Cervical back: Normal range of motion.      Right lower leg: No edema.      Left lower leg: No edema.   Skin:     General: Skin is warm and dry.      Capillary Refill: Capillary refill takes less than 2 seconds.      Coloration: Skin is not jaundiced.      Findings: No bruising, erythema, lesion or rash.   Neurological:      Mental Status: He is " alert and oriented to person, place, and time.      Gait: Gait normal.   Psychiatric:         Mood and Affect: Mood normal.         Behavior: Behavior normal.         Thought Content: Thought content normal.         Judgment: Judgment normal.         Assessment:       1. Colon cancer metastasized to mesenteric lymph nodes    2. Morbid obesity with BMI of 45.0-49.9, adult        Plan:       Colon cancer metastasized to mesenteric lymph nodes       -     Recommended he schedule follow up with oncology as soon as possible. He plans to call oncology back and schedule appointment today.     Morbid obesity with BMI of 45.0-49.9, adult        -     Patient would benefit from healthy diet, exercise and weight loss. Overall health and wellbeing would likely improve.                   Colin Ramirez PA-C  Family Medicine Physician Assistant       Future Appointments       Date Provider Specialty Appt Notes    5/21/2024 Colin Ramirez PA-C Family Medicine 6 mo f/u               I spent a total of 30 minutes on the day of the visit.This includes face to face time and non-face to face time preparing to see the patient (eg, review of tests), obtaining and/or reviewing separately obtained history, documenting clinical information in the electronic or other health record, independently interpreting results and communicating results to the patient/family/caregiver, or care coordinator.      We have addressed [4] Moderate: 1 or more chronic illnesses with exacerbation, progression, or side effects of treatment / 2 or more stable chronic illnesses / 1 undiagnosed new problem with uncertain prognosis / 1 acute illness with systemic symptoms / 1 acute complicated injury  The complexity of the data reviewed and analyzed for this visit was [2] Minimal or None  The risk of complications and/or morbidity or mortality are [3] Low risk   The level of Medical Decision Making for this visit is [3] Low

## 2024-04-30 ENCOUNTER — TELEPHONE (OUTPATIENT)
Dept: HEMATOLOGY/ONCOLOGY | Facility: CLINIC | Age: 64
End: 2024-04-30
Payer: MEDICARE

## 2024-05-01 ENCOUNTER — OFFICE VISIT (OUTPATIENT)
Dept: HEMATOLOGY/ONCOLOGY | Facility: CLINIC | Age: 64
End: 2024-05-01
Payer: MEDICARE

## 2024-05-01 VITALS
OXYGEN SATURATION: 97 % | BODY MASS INDEX: 47.74 KG/M2 | HEART RATE: 96 BPM | WEIGHT: 315 LBS | DIASTOLIC BLOOD PRESSURE: 66 MMHG | TEMPERATURE: 98 F | SYSTOLIC BLOOD PRESSURE: 135 MMHG | HEIGHT: 68 IN | RESPIRATION RATE: 12 BRPM

## 2024-05-01 DIAGNOSIS — R93.89 ABNORMAL CT SCAN: ICD-10-CM

## 2024-05-01 DIAGNOSIS — C18.6 MALIGNANT NEOPLASM OF DESCENDING COLON: ICD-10-CM

## 2024-05-01 DIAGNOSIS — K63.89 COLONIC MASS: ICD-10-CM

## 2024-05-01 PROCEDURE — 99205 OFFICE O/P NEW HI 60 MIN: CPT | Mod: S$PBB,,, | Performed by: INTERNAL MEDICINE

## 2024-05-01 PROCEDURE — 99214 OFFICE O/P EST MOD 30 MIN: CPT | Mod: PBBFAC,PN | Performed by: INTERNAL MEDICINE

## 2024-05-01 PROCEDURE — 99999 PR PBB SHADOW E&M-EST. PATIENT-LVL IV: CPT | Mod: PBBFAC,,, | Performed by: INTERNAL MEDICINE

## 2024-05-01 RX ORDER — FLUTICASONE PROPIONATE 50 MCG
1 SPRAY, SUSPENSION (ML) NASAL DAILY PRN
COMMUNITY

## 2024-05-01 NOTE — PROGRESS NOTES
HPI       64 years old male with discovered newly colonic mass.   Biopsied on 03/14/2024 shows invasive well-differentiated colonic adenocarcinoma.  MSI intact.   CT chest abdomen pelvis shows T5 vertebral body was used indeterminate  for possible distant metastases disease.  Left adrenal nodule is indeterminate at this point  on CT scan.   CT scan also demonstrated hepatic steatosis.        Past Medical History:   Diagnosis Date    Allergy     Hypothyroidism, unspecified     Sleep apnea     Thyroid disease      Social History     Socioeconomic History    Marital status:    Tobacco Use    Smoking status: Former    Smokeless tobacco: Never    Tobacco comments:     quit over 10 years ago   Substance and Sexual Activity    Alcohol use: Yes     Comment: social     Drug use: Yes     Types: Marijuana    Sexual activity: Yes         Subjective      Review of Systems   Constitutional: Negative for appetite change, fatigue and unexpected weight change.   HENT: Negative for mouth sores.   Eyes: Negative for visual disturbance.   Respiratory: Negative for cough and shortness of breath.   Cardiovascular: Negative for chest pain.   Gastrointestinal: Negative for diarrhea.   Genitourinary: Negative for frequency.   Musculoskeletal: Negative for back pain.   Skin: Negative for rash.   Neurological: Negative for headaches.   Hematological: Negative for adenopathy.   Psychiatric/Behavioral: The patient is not nervous/anxious.   All other systems reviewed and are negative.     Objective    Physical Exam   Vitals:    05/01/24 1318   BP: 135/66   Pulse: 96   Resp: 12   Temp: 98 °F (36.7 °C)         Constitutional: patient is oriented to person, place, and time. patient appears well-developed and well-nourished. No distress.   HENT:   Right Ear: External ear normal.   Left Ear: External ear normal.   Nose: Nose normal.   Mouth/Throat: Oropharynx is clear and moist. No oropharyngeal exudate.   Teeth, gums and lips are normal    No sinus tenderness   Palate, tongue, posterior pharynx are normal   Eyes: Conjunctivae and lids are normal.   Neck: Trachea normal and normal range of motion. No thyromegaly   Cardiovascular: Normal rate, regular rhythm, normal heart sounds, intact distal pulses and normal pulses.   No murmur heard.   No edema, no tenderness in the extremities.   Pulmonary/Chest: Effort normal and breath sounds normal. No accessory muscle usage. patient has no wheezes..   Abdominal: Soft. Normal appearance and bowel sounds are normal. patient exhibits no distension and no mass. There is no hepatosplenomegaly. There is no tenderness.   Musculoskeletal: Normal range of motion.   Gait is normal   No clubbing, cyanosis     Lymphadenopathy:   Head (right side): No submental and no submandibular adenopathy present.   Head (left side): No submental and no submandibular adenopathy present.   patient has no cervical adenopathy.   Right: No supraclavicular adenopathy present.   Left: No supraclavicular adenopathy present.   Neurological: patient is alert and oriented to person, place, and time. patient has normal strength and normal reflexes. No sensory deficit. Gait normal.   Skin: Skin is warm, dry and intact. No bruising, no lesion and no rash noted. No cyanosis. Nails show no clubbing.   No lesions   Psychiatric: patient has a normal mood and affect. patient speech is normal and behavior is normal. Judgment normal. Cognition and memory are normal.   Vitals reviewed.     Lab Results   Component Value Date    WBC 9.27 02/27/2024    HGB 14.0 02/27/2024    HCT 43.6 02/27/2024    MCV 86 02/27/2024     02/27/2024       CMP  Sodium   Date Value Ref Range Status   02/27/2024 140 136 - 145 mmol/L Final     Potassium   Date Value Ref Range Status   02/27/2024 3.7 3.5 - 5.1 mmol/L Final     Chloride   Date Value Ref Range Status   02/27/2024 105 95 - 110 mmol/L Final     CO2   Date Value Ref Range Status   02/27/2024 28 23 - 29 mmol/L  Final     Glucose   Date Value Ref Range Status   02/27/2024 121 (H) 70 - 110 mg/dL Final     BUN   Date Value Ref Range Status   02/27/2024 13 8 - 23 mg/dL Final     Creatinine   Date Value Ref Range Status   02/27/2024 1.1 0.5 - 1.4 mg/dL Final     Calcium   Date Value Ref Range Status   02/27/2024 9.2 8.7 - 10.5 mg/dL Final     Total Protein   Date Value Ref Range Status   02/27/2024 7.5 6.0 - 8.4 g/dL Final     Albumin   Date Value Ref Range Status   02/27/2024 4.2 3.5 - 5.2 g/dL Final     Total Bilirubin   Date Value Ref Range Status   02/27/2024 0.4 0.1 - 1.0 mg/dL Final     Comment:     For infants and newborns, interpretation of results should be based  on gestational age, weight and in agreement with clinical  observations.    Premature Infant recommended reference ranges:  Up to 24 hours.............<8.0 mg/dL  Up to 48 hours............<12.0 mg/dL  3-5 days..................<15.0 mg/dL  6-29 days.................<15.0 mg/dL       Alkaline Phosphatase   Date Value Ref Range Status   02/27/2024 56 55 - 135 U/L Final     AST   Date Value Ref Range Status   02/27/2024 15 10 - 40 U/L Final     ALT   Date Value Ref Range Status   02/27/2024 18 10 - 44 U/L Final     Anion Gap   Date Value Ref Range Status   02/27/2024 7 (L) 8 - 16 mmol/L Final     eGFR   Date Value Ref Range Status   02/27/2024 >60.0 >60 mL/min/1.73 m^2 Final           Assessment      Colonic invasive mass well-differentiated adenocarcinoma  MSI stable     There is several pulmonary nodule and lesion on T5 vertebral which is indeterminate for possible distant metastases.    >   Will set up PET scan for evaluation  >  consult surgery  > will see patient after PET  > check CEA     Plan    There are no diagnoses linked to this encounter.

## 2024-05-01 NOTE — Clinical Note
Patient have anxiety about his cancer.  Wants to talk to the family before any decision can be made.  On I ordered a PET scan blood work for him and he wants to talk to his wife before he makes a decision for PET scan so he will let us know.

## 2024-05-06 ENCOUNTER — TELEPHONE (OUTPATIENT)
Dept: FAMILY MEDICINE | Facility: CLINIC | Age: 64
End: 2024-05-06
Payer: MEDICARE

## 2024-05-06 NOTE — TELEPHONE ENCOUNTER
Left message for patient to return call to reschedule appointment with LOGAN Ramirez on May 21st due to him being out of the office.

## 2024-05-13 ENCOUNTER — TELEPHONE (OUTPATIENT)
Dept: HEMATOLOGY/ONCOLOGY | Facility: CLINIC | Age: 64
End: 2024-05-13
Payer: MEDICARE

## 2024-05-13 NOTE — TELEPHONE ENCOUNTER
Outgoing call to pt. Spoke to spouse. Pt not wanting to schedule PET scan at this time. Per spouse pt will call back our office when ready to schedule.

## 2024-05-16 ENCOUNTER — PATIENT MESSAGE (OUTPATIENT)
Dept: FAMILY MEDICINE | Facility: CLINIC | Age: 64
End: 2024-05-16
Payer: MEDICARE

## 2024-05-28 ENCOUNTER — TELEPHONE (OUTPATIENT)
Dept: HEMATOLOGY/ONCOLOGY | Facility: CLINIC | Age: 64
End: 2024-05-28
Payer: MEDICARE

## 2024-05-28 NOTE — TELEPHONE ENCOUNTER
Called patient's phone but unable to LVM. Called spouse's phone and LVM to contact office to schedule PET scan, lab work and follow-up appointments per Dr. Alejandra's request.

## 2024-05-30 ENCOUNTER — TELEPHONE (OUTPATIENT)
Dept: SURGERY | Facility: CLINIC | Age: 64
End: 2024-05-30
Payer: MEDICARE

## 2024-05-30 NOTE — TELEPHONE ENCOUNTER
LMOR @ 11:32am for patients wife to ask her if her  is receiving care somewhere else for the colon cancer. Sae

## 2024-06-04 NOTE — TELEPHONE ENCOUNTER
Rishi will inform Dr. Bay that I couldn't get in touch with the patient and he didn't call back.  Sae

## 2024-12-09 DIAGNOSIS — E03.9 HYPOTHYROIDISM, UNSPECIFIED TYPE: ICD-10-CM

## 2024-12-09 RX ORDER — LEVOTHYROXINE SODIUM 75 UG/1
75 TABLET ORAL
Qty: 30 TABLET | Refills: 1 | Status: SHIPPED | OUTPATIENT
Start: 2024-12-09

## 2024-12-09 NOTE — TELEPHONE ENCOUNTER
Spoke with Mr. Ayala in regards to medication refill being sent to the pharmacy. Mr. Ayala was also notified he will need an appointment prior to his future refill, and Mr. Ayala stated he will call back at a later time for an appointment.

## 2025-01-07 ENCOUNTER — HOSPITAL ENCOUNTER (EMERGENCY)
Facility: HOSPITAL | Age: 65
Discharge: ANOTHER HOSPITAL | End: 2025-01-07
Attending: EMERGENCY MEDICINE
Payer: MEDICARE

## 2025-01-07 ENCOUNTER — HOSPITAL ENCOUNTER (EMERGENCY)
Facility: HOSPITAL | Age: 65
Discharge: HOME | End: 2025-01-07
Attending: EMERGENCY MEDICINE
Payer: MEDICARE

## 2025-01-07 VITALS
HEIGHT: 68 IN | DIASTOLIC BLOOD PRESSURE: 85 MMHG | RESPIRATION RATE: 16 BRPM | TEMPERATURE: 99 F | WEIGHT: 284 LBS | OXYGEN SATURATION: 96 % | BODY MASS INDEX: 43.04 KG/M2 | SYSTOLIC BLOOD PRESSURE: 153 MMHG | HEART RATE: 85 BPM

## 2025-01-07 VITALS
DIASTOLIC BLOOD PRESSURE: 84 MMHG | HEART RATE: 87 BPM | TEMPERATURE: 99 F | SYSTOLIC BLOOD PRESSURE: 130 MMHG | RESPIRATION RATE: 16 BRPM | OXYGEN SATURATION: 99 %

## 2025-01-07 DIAGNOSIS — H54.61 VISION LOSS, RIGHT EYE: Primary | ICD-10-CM

## 2025-01-07 DIAGNOSIS — R29.818 ACUTE FOCAL NEUROLOGICAL DEFICIT: ICD-10-CM

## 2025-01-07 DIAGNOSIS — H34.8112 CENTRAL RETINAL VEIN OCCLUSION OF RIGHT EYE, UNSPECIFIED COMPLICATION STATUS: Primary | ICD-10-CM

## 2025-01-07 PROBLEM — H54.60 MONOCULAR VISION LOSS: Status: ACTIVE | Noted: 2025-01-07

## 2025-01-07 LAB
ALBUMIN SERPL BCP-MCNC: 3.9 G/DL (ref 3.5–5.2)
ALP SERPL-CCNC: 65 U/L (ref 40–150)
ALT SERPL W/O P-5'-P-CCNC: 16 U/L (ref 10–44)
ANION GAP SERPL CALC-SCNC: 12 MMOL/L (ref 8–16)
AST SERPL-CCNC: 21 U/L (ref 10–40)
BASOPHILS # BLD AUTO: 0.05 K/UL (ref 0–0.2)
BASOPHILS NFR BLD: 0.7 % (ref 0–1.9)
BILIRUB SERPL-MCNC: 0.3 MG/DL (ref 0.1–1)
BUN SERPL-MCNC: 11 MG/DL (ref 8–23)
CALCIUM SERPL-MCNC: 9.5 MG/DL (ref 8.7–10.5)
CHLORIDE SERPL-SCNC: 105 MMOL/L (ref 95–110)
CHOLEST SERPL-MCNC: 208 MG/DL (ref 120–199)
CHOLEST/HDLC SERPL: 4.3 {RATIO} (ref 2–5)
CO2 SERPL-SCNC: 24 MMOL/L (ref 23–29)
CREAT SERPL-MCNC: 1.1 MG/DL (ref 0.5–1.4)
DIFFERENTIAL METHOD BLD: NORMAL
EOSINOPHIL # BLD AUTO: 0.2 K/UL (ref 0–0.5)
EOSINOPHIL NFR BLD: 2.1 % (ref 0–8)
ERYTHROCYTE [DISTWIDTH] IN BLOOD BY AUTOMATED COUNT: 12.7 % (ref 11.5–14.5)
ERYTHROCYTE [SEDIMENTATION RATE] IN BLOOD BY WESTERGREN METHOD: 12 MM/HR (ref 0–10)
EST. GFR  (NO RACE VARIABLE): >60 ML/MIN/1.73 M^2
GLUCOSE SERPL-MCNC: 103 MG/DL (ref 70–110)
HCT VFR BLD AUTO: 44.3 % (ref 40–54)
HCV AB SERPL QL IA: NEGATIVE
HDLC SERPL-MCNC: 48 MG/DL (ref 40–75)
HDLC SERPL: 23.1 % (ref 20–50)
HGB BLD-MCNC: 14.5 G/DL (ref 14–18)
HIV 1+2 AB+HIV1 P24 AG SERPL QL IA: NEGATIVE
IMM GRANULOCYTES # BLD AUTO: 0.02 K/UL (ref 0–0.04)
IMM GRANULOCYTES NFR BLD AUTO: 0.3 % (ref 0–0.5)
INR PPP: 1 (ref 0.8–1.2)
LDLC SERPL CALC-MCNC: 134.6 MG/DL (ref 63–159)
LYMPHOCYTES # BLD AUTO: 2.2 K/UL (ref 1–4.8)
LYMPHOCYTES NFR BLD: 29.7 % (ref 18–48)
MCH RBC QN AUTO: 28.2 PG (ref 27–31)
MCHC RBC AUTO-ENTMCNC: 32.7 G/DL (ref 32–36)
MCV RBC AUTO: 86 FL (ref 82–98)
MONOCYTES # BLD AUTO: 0.6 K/UL (ref 0.3–1)
MONOCYTES NFR BLD: 8 % (ref 4–15)
NEUTROPHILS # BLD AUTO: 4.5 K/UL (ref 1.8–7.7)
NEUTROPHILS NFR BLD: 59.2 % (ref 38–73)
NONHDLC SERPL-MCNC: 160 MG/DL
NRBC BLD-RTO: 0 /100 WBC
PLATELET # BLD AUTO: 283 K/UL (ref 150–450)
PMV BLD AUTO: 9.8 FL (ref 9.2–12.9)
POC PTINR: 1.1 (ref 0.9–1.2)
POCT GLUCOSE: 89 MG/DL (ref 70–110)
POTASSIUM SERPL-SCNC: 4 MMOL/L (ref 3.5–5.1)
PROT SERPL-MCNC: 7.7 G/DL (ref 6–8.4)
PROTHROMBIN TIME: 11.1 SEC (ref 9–12.5)
RBC # BLD AUTO: 5.14 M/UL (ref 4.6–6.2)
SAMPLE: NORMAL
SODIUM SERPL-SCNC: 141 MMOL/L (ref 136–145)
TRIGL SERPL-MCNC: 127 MG/DL (ref 30–150)
TSH SERPL DL<=0.005 MIU/L-ACNC: 2.27 UIU/ML (ref 0.4–4)
WBC # BLD AUTO: 7.53 K/UL (ref 3.9–12.7)

## 2025-01-07 PROCEDURE — 36415 COLL VENOUS BLD VENIPUNCTURE: CPT | Performed by: EMERGENCY MEDICINE

## 2025-01-07 PROCEDURE — 80061 LIPID PANEL: CPT | Performed by: EMERGENCY MEDICINE

## 2025-01-07 PROCEDURE — 96361 HYDRATE IV INFUSION ADD-ON: CPT

## 2025-01-07 PROCEDURE — 85025 COMPLETE CBC W/AUTO DIFF WBC: CPT | Performed by: EMERGENCY MEDICINE

## 2025-01-07 PROCEDURE — 85651 RBC SED RATE NONAUTOMATED: CPT | Performed by: EMERGENCY MEDICINE

## 2025-01-07 PROCEDURE — 99282 EMERGENCY DEPT VISIT SF MDM: CPT

## 2025-01-07 PROCEDURE — 63600175 PHARM REV CODE 636 W HCPCS: Performed by: EMERGENCY MEDICINE

## 2025-01-07 PROCEDURE — 96374 THER/PROPH/DIAG INJ IV PUSH: CPT

## 2025-01-07 PROCEDURE — 87389 HIV-1 AG W/HIV-1&-2 AB AG IA: CPT | Performed by: EMERGENCY MEDICINE

## 2025-01-07 PROCEDURE — 99900035 HC TECH TIME PER 15 MIN (STAT)

## 2025-01-07 PROCEDURE — G0508 CRIT CARE TELEHEA CONSULT 60: HCPCS | Mod: 95,,, | Performed by: PSYCHIATRY & NEUROLOGY

## 2025-01-07 PROCEDURE — 85610 PROTHROMBIN TIME: CPT

## 2025-01-07 PROCEDURE — 82962 GLUCOSE BLOOD TEST: CPT

## 2025-01-07 PROCEDURE — 80053 COMPREHEN METABOLIC PANEL: CPT | Performed by: EMERGENCY MEDICINE

## 2025-01-07 PROCEDURE — 85610 PROTHROMBIN TIME: CPT | Performed by: EMERGENCY MEDICINE

## 2025-01-07 PROCEDURE — 99285 EMERGENCY DEPT VISIT HI MDM: CPT | Mod: 25

## 2025-01-07 PROCEDURE — 96375 TX/PRO/DX INJ NEW DRUG ADDON: CPT

## 2025-01-07 PROCEDURE — 25500020 PHARM REV CODE 255

## 2025-01-07 PROCEDURE — 93010 ELECTROCARDIOGRAM REPORT: CPT | Mod: ,,, | Performed by: INTERNAL MEDICINE

## 2025-01-07 PROCEDURE — 86803 HEPATITIS C AB TEST: CPT | Performed by: EMERGENCY MEDICINE

## 2025-01-07 PROCEDURE — 84443 ASSAY THYROID STIM HORMONE: CPT | Performed by: EMERGENCY MEDICINE

## 2025-01-07 PROCEDURE — 93005 ELECTROCARDIOGRAM TRACING: CPT

## 2025-01-07 PROCEDURE — 25000003 PHARM REV CODE 250: Performed by: EMERGENCY MEDICINE

## 2025-01-07 PROCEDURE — 94760 N-INVAS EAR/PLS OXIMETRY 1: CPT

## 2025-01-07 RX ORDER — METOCLOPRAMIDE HYDROCHLORIDE 5 MG/ML
10 INJECTION INTRAMUSCULAR; INTRAVENOUS
Status: COMPLETED | OUTPATIENT
Start: 2025-01-07 | End: 2025-01-07

## 2025-01-07 RX ORDER — ACETAMINOPHEN 325 MG/1
975 TABLET ORAL
Status: DISCONTINUED | OUTPATIENT
Start: 2025-01-07 | End: 2025-01-08 | Stop reason: HOSPADM

## 2025-01-07 RX ORDER — DIPHENHYDRAMINE HYDROCHLORIDE 50 MG/ML
25 INJECTION INTRAMUSCULAR; INTRAVENOUS
Status: COMPLETED | OUTPATIENT
Start: 2025-01-07 | End: 2025-01-07

## 2025-01-07 RX ORDER — PROPARACAINE HYDROCHLORIDE 5 MG/ML
1 SOLUTION/ DROPS OPHTHALMIC
Status: COMPLETED | OUTPATIENT
Start: 2025-01-07 | End: 2025-01-07

## 2025-01-07 RX ORDER — NAPROXEN SODIUM 220 MG/1
81 TABLET, FILM COATED ORAL
Status: COMPLETED | OUTPATIENT
Start: 2025-01-07 | End: 2025-01-07

## 2025-01-07 RX ADMIN — ASPIRIN 81 MG CHEWABLE TABLET 81 MG: 81 TABLET CHEWABLE at 03:01

## 2025-01-07 RX ADMIN — SODIUM CHLORIDE 1000 ML: 9 INJECTION, SOLUTION INTRAVENOUS at 03:01

## 2025-01-07 RX ADMIN — METOCLOPRAMIDE HYDROCHLORIDE 10 MG: 5 INJECTION INTRAMUSCULAR; INTRAVENOUS at 03:01

## 2025-01-07 RX ADMIN — PROPARACAINE HYDROCHLORIDE 1 DROP: 5 SOLUTION/ DROPS OPHTHALMIC at 12:01

## 2025-01-07 RX ADMIN — DIPHENHYDRAMINE HYDROCHLORIDE 25 MG: 50 INJECTION INTRAMUSCULAR; INTRAVENOUS at 03:01

## 2025-01-07 RX ADMIN — IOHEXOL 75 ML: 350 INJECTION, SOLUTION INTRAVENOUS at 12:01

## 2025-01-07 NOTE — ED PROVIDER NOTES
Dictation #1  MRN:[id number]  CSN:[id number] Encounter Date: 1/7/2025       History     Chief Complaint   Patient presents with    Eye Problem     Vision change to x 1.5 hrs ago      64-year-old male presents emergency department with visual disturbance.  Patient states that about 2 hours prior to arrival in the ER he is it started to experience some blurry vision.  He says that he seems like he can not see things out of his right eye.  His left eye is not affected.  Says that it is blurry.  It is not complete loss of vision.  It was not a curtain coming down over his eye.  It is painless.  He does not have any associated headache.  He does wear glasses.  He does not wear contacts.  Says he has not had this issue in the past.  He says he was watching TV when this 1st started.      Review of patient's allergies indicates:   Allergen Reactions    Codeine     Penicillins      Past Medical History:   Diagnosis Date    Allergy     Hypothyroidism, unspecified     Sleep apnea     Thyroid disease      Past Surgical History:   Procedure Laterality Date    ACHILLES TENDON SURGERY      COLONOSCOPY N/A 3/14/2024    Procedure: COLONOSCOPY;  Surgeon: Andre Juan MD;  Location: [facility];  Service: Endoscopy;  Laterality: N/A;  ppm    EYE SURGERY Bilateral      Family History   Problem Relation Name Age of Onset    Diabetes Mother      Heart disease Father      Diabetes Maternal Aunt      Diabetes Maternal Uncle      Glaucoma Neg Hx      Lupus Neg Hx      Psoriasis Neg Hx      Melanoma Neg Hx       Social History     Tobacco Use    Smoking status: Former    Smokeless tobacco: Never    Tobacco comments:     quit over 10 years ago   Substance Use Topics    Alcohol use: Yes     Comment: social     Drug use: Yes     Types: Marijuana     Review of Systems   Constitutional:  Negative for chills and fever.   HENT:  Negative for sore throat.    Eyes:  Positive for visual disturbance. Negative for pain.   Respiratory:  Negative for  shortness of breath.    Cardiovascular:  Negative for chest pain.   Gastrointestinal:  Negative for nausea.   Genitourinary:  Negative for dysuria.   Musculoskeletal:  Negative for back pain.   Skin:  Negative for rash.   Neurological:  Negative for dizziness, syncope, speech difficulty, weakness, numbness and headaches.   Hematological:  Does not bruise/bleed easily.   All other systems reviewed and are negative.      Physical Exam     Initial Vitals   BP Pulse Resp Temp SpO2   01/07/25 1216 01/07/25 1216 01/07/25 1216 01/07/25 1247 01/07/25 1216   (!) 182/92 88 16 98.6 °F (37 °C) 98 %      MAP       --                Physical Exam    Nursing note and vitals reviewed.  Constitutional: He appears well-developed and well-nourished. He is not diaphoretic. No distress.   HENT:   Head: Normocephalic and atraumatic. Mouth/Throat: Oropharynx is clear and moist. No oropharyngeal exudate.   No temporal artery tenderness to palpation bilaterally   Eyes: Conjunctivae, EOM and lids are normal. Pupils are equal, round, and reactive to light. Right eye exhibits no exudate. Right conjunctiva is not injected.   Slit lamp exam:       The right eye shows no corneal ulcer, no hyphema and no hypopyon.   Patient seems to have in the left upper medial quadrant of the right eyes visual field that he is unable to see anything.  Other quadrants of the right eye are within normal limits.    Left eye within normal limits in all quadrants.  Intra-ocular pressure in the right eye is 23.   Neck: No tracheal deviation present.   Cardiovascular:  Normal rate, regular rhythm, normal heart sounds and intact distal pulses.           No murmur heard.  Pulmonary/Chest: Breath sounds normal. No stridor. No respiratory distress. He has no wheezes. He has no rhonchi. He has no rales.   Abdominal: Abdomen is soft. Bowel sounds are normal. He exhibits no distension. There is no abdominal tenderness. There is no rebound and no guarding.   Musculoskeletal:          General: No tenderness or edema. Normal range of motion.     Neurological: He is alert and oriented to person, place, and time. He has normal strength and normal reflexes. No cranial nerve deficit or sensory deficit.   Speech is normal.  No facial droop.  5/5 strength in upper and lower extremities bilaterally.  Normal finger-to-nose.   Skin: Skin is warm and dry. Capillary refill takes less than 2 seconds. No rash noted. No erythema. No pallor.   Psychiatric: He has a normal mood and affect. His behavior is normal. Thought content normal.         ED Course   Procedures  Labs Reviewed   LIPID PANEL - Abnormal       Result Value    Cholesterol 208 (*)     Triglycerides 127      HDL 48      LDL Cholesterol 134.6      HDL/Cholesterol Ratio 23.1      Total Cholesterol/HDL Ratio 4.3      Non-HDL Cholesterol 160     CBC W/ AUTO DIFFERENTIAL    WBC 7.53      RBC 5.14      Hemoglobin 14.5      Hematocrit 44.3      MCV 86      MCH 28.2      MCHC 32.7      RDW 12.7      Platelets 283      MPV 9.8      Immature Granulocytes 0.3      Gran # (ANC) 4.5      Immature Grans (Abs) 0.02      Lymph # 2.2      Mono # 0.6      Eos # 0.2      Baso # 0.05      nRBC 0      Gran % 59.2      Lymph % 29.7      Mono % 8.0      Eosinophil % 2.1      Basophil % 0.7      Differential Method Automated     COMPREHENSIVE METABOLIC PANEL    Sodium 141      Potassium 4.0      Chloride 105      CO2 24      Glucose 103      BUN 11      Creatinine 1.1      Calcium 9.5      Total Protein 7.7      Albumin 3.9      Total Bilirubin 0.3      Alkaline Phosphatase 65      AST 21      ALT 16      eGFR >60      Anion Gap 12     PROTIME-INR    Prothrombin Time 11.1      INR 1.0     TSH    TSH 2.265     HEPATITIS C ANTIBODY    Hepatitis C Ab Negative      Narrative:     Release to patient->Immediate   HIV 1 / 2 ANTIBODY    HIV 1/2 Ag/Ab Negative      Narrative:     Release to patient->Immediate   POCT GLUCOSE, HAND-HELD DEVICE   POCT GLUCOSE    POCT Glucose  89     ISTAT PROCEDURE    POC PTINR 1.1      Sample unknown     POCT PROTIME-INR          Imaging Results              CTA Head and Neck (xpd) (Final result)  Result time 01/07/25 13:18:22      Final result by Sharan Mendenhall MD (01/07/25 13:18:22)                   Impression:      1. No acute intracranial abnormality.  Further evaluation with MRI could be performed, as clinically warranted.  2. Limited CTA head examination of the head secondary to poor contrast bolus timing/technique, without evidence of large vessel occlusion or high-grade stenosis in the proximal qhubkb-[id number]-Tqybhz branches.  Consider further evaluation with MRA of the head, as clinically warranted.  3. Minimal atherosclerotic disease at the left carotid bifurcation without evidence of a hemodynamically significant stenosis, major branch occlusion, dissection or aneurysm in the neck arterial vasculature.  4. Redemonstration of a heterogeneous enhancing mass at the tail of the left parotid gland consistent with known Warthin's tumor, increased in size as compared to the prior soft tissue neck on 04/08/2022.  5. Slight interval increase in size of smaller bilateral enhancing parotid nodular lesions, possibly additional Warthin's tumors.  6. Stable bilateral subcentimeter juxtapleural nodules, ranging 4-6 mm in size.  7. Stable fusiform dilatation of the proximal descending thoracic aorta, 4.0 cm.  8. Diffusely heterogeneous, multinodular thyroid gland.  Recommend correlation with nonemergent outpatient thyroid ultrasound, if not already performed.  The significant finding above was relayed by myself  by telephone to Blair Gallo DO on 01/07/2025 at 13:08.      Electronically signed by: Sharan Mendenhall  Date:    01/07/2025  Time:    13:18               Narrative:    EXAMINATION:  CTA HEAD AND NECK (XPD)    CLINICAL HISTORY:  Neuro deficit, acute, stroke suspected;    TECHNIQUE:  Non contrast low dose axial images were obtained through the head. CT  angiogram was performed from the level of the ty to the top of the head following the IV administration of 75mL of Omnipaque 350.   Sagittal and coronal reconstructions and maximum intensity projection reconstructions were performed. Arterial stenosis percentages are based on NASCET measurement criteria.    COMPARISON:  CT soft tissue neck 04/08/2022, CT chest abdomen and pelvis 03/26/2024    FINDINGS:  Brain:    No evidence of acute intracranial hemorrhage.    The ventricles and sulci are appropriate in size and configuration for the patient's age without hydrocephalus.    Mild scattered hypoattenuation within the supratentorial white matter, nonspecific but probably reflecting sequelae of chronic small vessel ischemic change.  There is no significant mass effect, midline shift, or extra-axial fluid collection. Gray-white matter differentiation is within normal limits without evidence of an acute major vascular territory infarct.    No abnormal intracranial enhancement.    The visualized orbits are normal. Partial opacification of the left greater than right sphenoid sinuses with lobular mucosal membrane thickening with possible superimposed retention cyst or polyp formation.  The visualized portions of the mastoids are unremarkable. No acute calvarial fracture.    Extracranial:    Aorta and great vessels: Left-sided aortic arch with no significant atherosclerotic plaque.  Stable fusiform dilatation of the proximal descending thoracic aorta, 4.0 cm.  The origins of the great vessels are patent.  The included subclavian arteries are patent.    Right carotid: The right common carotid artery is without significant stenosis.   The right internal carotid artery is without hemodynamically significant (>50%) stenosis, occlusion, or dissection.    Left carotid: The left common carotid artery is without significant stenosis. Minimal atherosclerotic plaque at the left carotid bifurcation.  The left internal carotid  artery is without hemodynamically significant (>50%) stenosis, occlusion, or dissection.    Extracranial vertebral arteries: Vertebral arteries are codominant.  The vertebral arteries are without significant stenosis, occlusion, or dissection to the skull base.    Intracranial: Examination is limited secondary to a poor contrast bolus timing.    Anterior circulation: Mild calcific atherosclerosis of bilateral carotid artery siphons and right middle cerebral artery M1 segment without definite high-grade stenosis or occlusion.    Otherwise, the bilateral proximal middle cerebral artery branches appear grossly patent.  The anterior cerebral arteries are grossly patent.    Posterior circulation: Mild calcific atherosclerosis of bilateral vertebral artery V4 segments without high-grade stenosis or occlusion.  The basilar artery is normal. The posterior cerebral arteries are patent.    No obvious large vessel occlusion or high-grade stenosis in the proximal crwwas-kg-Lzhvcx branches.    No evidence of aneurysm or arteriovenous malformation.    Dural venous sinuses are patent.    Other:    Redemonstration of a circumscribed, heterogeneously enhancing mass along the tail the left parotid gland coursing along the anterior margin of the left sternocleidomastoid muscle, measuring 3.4 x 3.3 x 5.3 cm in AP by TV by CC dimensions, enlarged in comparison to the prior CT soft tissue neck on 04/08/2022.  This is been previously biopsied with pathology consistent with Warthin's tumor.  There is an additional slightly enlarged 1.6 cm enhancing nodule along the superior margin of this lesion, possibly contiguous.  In addition, there is been slight interval increase in size of a 1.0 cm and 0.9 cm enhancing nodular lesions in the superficial lobe of the right parotid gland, possibly additional small Warthin's tumors.    Stable bilateral subcentimeter juxtapleural nodules, ranging 4-6 mm in size, with the largest 6 mm nodule measuring  located in the left upper lobe (series 5, image 7).    Diffusely heterogeneous, multinodular thyroid gland, with dominant 1.5 cm heterogeneous enhancing nodule in the inferior pole of the right thyroid gland.    There are degenerative spine changes. No concerning osseous lesions identified.                                       Medications   iohexoL (OMNIPAQUE 350) injection 75 mL (75 mLs Intravenous Given 1/7/25 1244)   proparacaine 0.5 % ophthalmic solution 1 drop (1 drop Right Eye Given 1/7/25 1245)   aspirin chewable tablet 81 mg (81 mg Oral Given 1/7/25 1522)   metoclopramide injection 10 mg (10 mg Intravenous Given 1/7/25 1522)   diphenhydrAMINE injection 25 mg (25 mg Intravenous Given 1/7/25 1522)   sodium chloride 0.9% bolus 1,000 mL 1,000 mL (1,000 mLs Intravenous New Bag 1/7/25 1523)     Medical Decision Making  Differential includes but not limited to stroke, TIA, temporal arteritis, migraine, central retinal artery occlusion, central retinal vein occlusion.    Emergent evaluation of a 64 year male presents emergency department with visual disturbance.  Patient was a stroke alert.  Patient was evaluated by tele neurology.  Patient was evaluated emergently by tele neurology in the emergency department.  I was present during the evaluation.  Dr. Gonsalves and I discussed with the patient at the bedside that this is unlikely to be a stroke but not impossible given the fact it is monocular visual disturbance.  Does not sound like central retinal artery occlusion given not complete black out.  Patient does not have any visual field loss in his left eye which makes central pathology less likely.  We felt that given the fact that primary ocular pathology as a cause of the patient's visual disturbance we felt that the risk of TNK outweighs the benefit.  Patient could have hemorrhage etcetera causing his symptoms and TNK would make this worse.  Potentially could lead to blindness.  Patient was comfortable with with  this decision with this choice.  We discussed this with him in detail at bedside.  Patient did develop headache in the ER which I did give Reglan Benadryl as migraine was in the differential as well.  Headache resolved but patient is still has decreased vision in his visual disturbance did not resolve in his right eye so decision was made to transfer for Ophthalmology and Neurology.  Patient need further inpatient evaluation by both subspecialists.  Aspirin has been given in the emergency department will hold off on Plavix given uncertainty of stroke diagnosis.    A dictation software program was used for this note.  Please expect some simple typographical  errors in this note.     Amount and/or Complexity of Data Reviewed  External Data Reviewed: labs and notes.  Labs: ordered. Decision-making details documented in ED Course.  Radiology: ordered and independent interpretation performed. Decision-making details documented in ED Course.  ECG/medicine tests: ordered and independent interpretation performed. Decision-making details documented in ED Course.    Risk  OTC drugs.  Prescription drug management.  Decision regarding hospitalization.              Attending Attestation:             Attending ED Notes:   Attending Critical Care:   Critical Care Times:   Direct Patient Care (initial evaluation, reassessments, and time considering the case)................................................................10 minutes.   Additional History from reviewing old medical records or taking additional history from the family, EMS, PCP, etc.......................10 minutes.   Ordering, Reviewing, and Interpreting Diagnostic Studies...............................................................................................................10 minutes.    Documentation..................................................................................................................................................................................5 minutes.   ==============================================================  · Total Critical Care Time - exclusive of procedural time: 35 minutes.  ==============================================================  Critical care was necessary to treat or prevent imminent or life-threatening deterioration of the following conditions:  TIA, CVA, central retinal artery occlusion, glaucoma.   Critical care was time spent personally by me on the following activities: obtaining history from patient or relative, examination of patient, review of x-rays / CT sent with the patient, ordering lab, x-rays, and/or EKG, development of treatment plan with patient or relative, ordering and performing treatments and interventions, interpretation of cardiac measurements and re-evaluation of patient's conition.   Critical Care Condition: potentially life-threatening         ED Course as of 01/07/25 1723 Tue Jan 07, 2025   1507 Patient reassess, now developing a pressure type headache.  Says he still has like a kalidescope in his vision.  I will give medication for headache and reassess. [JR]   1523 EKG 1:09 p.m. normal sinus rhythm rate of 89.  No ST elevation or depression.  No evidence of ischemia.  Normal intervals.  No STEMI.  EKG interpreted independently by me. [JR]   1722 Bedside ultrasound without any evidence of any obvious retinal detachment, I do not see any obvious vitreous hemorrhage [JR]      ED Course User Index  [JR] Mark Anthony Gallo DO                             Clinical Impression:  Final diagnoses:  [R29.818] Acute focal neurological deficit  [H54.61] Vision loss, right eye (Primary)          ED Disposition Condition    Transfer to Another Facility Stable                Mark Anthony Gallo DO  01/07/25 [id number]     Lena Benavides RN

## 2025-01-07 NOTE — TELEMEDICINE CONSULT
"[facility]  Vascular Neurology  Comprehensive Stroke Center  TeleVascular Neurology Acute Consultation Note        Consult Information  Consult to Telemedicine - Acute Stroke  Consult performed by: William Reed MD  Consult ordered by: Satnam Gallo DO          Consulting Provider: SATNAM GALLO   Current Providers  No providers found    Patient Location:  [facility] EMERGENCY DEPARTMENT Emergency Department    Spoke hospital nurse at bedside with patient assisting consultant.  Patient information was obtained from patient.       Stroke Documentation  Acute Stroke Times   Last Known Normal Time: 1100  Stroke Team Called Time: 1241  Stroke Team Arrival Time: 1246  CT Interpretation Time: 1259  Thrombolytic Therapy Recommended: No  Thrombectomy Recommended: No    NIH Scale:  1a. Level of Consciousness: 0-->Alert, keenly responsive  1b. LOC Questions: 0-->Answers both questions correctly  1c. LOC Commands: 0-->Performs both tasks correctly  2. Best Gaze: 0-->Normal  3. Visual: 0-->No visual loss  4. Facial Palsy: 0-->Normal symmetrical movements  5a. Motor Arm, Left: 0-->No drift, limb holds 90 (or 45) degrees for full 10 secs  5b. Motor Arm, Right: 0-->No drift, limb holds 90 (or 45) degrees for full 10 secs  6a. Motor Leg, Left: 0-->No drift, leg holds 30 degree position for full 5 secs  6b. Motor Leg, Right: 0-->No drift, leg holds 30 degree position for full 5 secs  7. Limb Ataxia: 0-->Absent  8. Sensory: 0-->Normal, no sensory loss  9. Best Language: 0-->No aphasia, normal  10. Dysarthria: 0-->Normal  11. Extinction and Inattention (formerly Neglect): 0-->No abnormality  Total (NIH Stroke Scale): 0      Modified Elvie:    Ephraim Coma Scale:     ABCD2 Score:    [id number] Score:    HAS -BLED Score:    ICH Score:    Hunt & Tse Classification:      Blood pressure (!) 182/92, pulse 88, temperature 98.6 °F (37 °C), resp. rate 16, height 5' 8" (1.727 m), weight 128.8 kg (284 lb), SpO2 " 98%.      In my opinion, this was a: Tier 1; VAN Stroke Assessment: Negative     Medical Decision Making  HPI:  64 y.o. male w/ lost vision loss in the right eye.    Current Outpatient Medications   Medication Instructions    azelastine (ASTELIN) 137 mcg, Nasal, 2 times daily    fluticasone propionate (FLONASE) 50 mcg/actuation nasal spray 1 spray, Each Nostril, Daily PRN    HYDROcodone-acetaminophen (NORCO) 5-325 mg per tablet 1 tablet, Oral, Every 8 hours PRN    ibuprofen (ADVIL,MOTRIN) 800 mg, Oral, 3 times daily PRN    levothyroxine (SYNTHROID) 75 mcg, Oral, Before breakfast, Needs appointment prior to future refills.    meclizine (ANTIVERT) 25 mg, Oral, 3 times daily PRN    tadalafiL (CIALIS) 10 mg, Oral, Daily PRN     Past Medical History:   Diagnosis Date    Allergy     Hypothyroidism, unspecified     Sleep apnea     Thyroid disease         Imaging personally reviewed & interpreted:  CT Brain: no evidence of early or subacute ischemic changes, intracerebral hemorrhage or hyperdense vessel signs  CTA Head & Neck: cervico-cephalic vasculature negative for any clear contributing large or medium vessel occlusion related to the patient's current presentation and no targets identified for acute or urgent reperfusion therapy       Assessment and plan:  Incomplete R eye monocular vision obscuration - able to count fingers in all quadrants except superior nasal. Pattern not very consistent with CRAO or BRAO.   Also c/o kaleidoscoping when he closes and opens his eye.  CTA negative, no clear ipsilateral atherosclerotic disease.  Recommend bedside fundoscopic eval to r/o gross hemorrhage.  Ok to start asa 81 mg.  Needs evaluation by ophthalmology to fully evaluate ddx  Given diagnostic uncertainty and partial obscuration / low severity of symptoms, I don't feel risk of thrombolysis outweighs any possible benefit.    Lytics recommendation: Thrombolytic therapy not recommended due to Mild Non-Disabling  Symptoms    Thrombectomy recommendation: No; No large vessel occlusion identified on imaging   Placement recommendation: pending further studies               ROS  Physical Exam  Past Medical History:   Diagnosis Date    Allergy     Hypothyroidism, unspecified     Sleep apnea     Thyroid disease      Past Surgical History:   Procedure Laterality Date    ACHILLES TENDON SURGERY      COLONOSCOPY N/A 3/14/2024    Procedure: COLONOSCOPY;  Surgeon: Andre Juan MD;  Location: [facility];  Service: Endoscopy;  Laterality: N/A;  ppm    EYE SURGERY Bilateral      Family History   Problem Relation Name Age of Onset    Diabetes Mother      Heart disease Father      Diabetes Maternal Aunt      Diabetes Maternal Uncle      Glaucoma Neg Hx      Lupus Neg Hx      Psoriasis Neg Hx      Melanoma Neg Hx         Diagnoses  Problem Noted   Monocular Vision Loss 1/7/2025       William Reed MD      Emergent/Acute neurological consultation requested by spoke provider due to critical concerns for possible cerebrovascular event that could result in permanent loss of neurologic/bodily function, severe disability or death of this patient.  Immediate/timely evaluation by a highly prepared expert is paramount for optimal outcomes  High risk for neurological deterioration if not properly diagnosed  High risk for neurological deterioration if not treated promplty/as soon as possible  Complex diagnostic evaluation may be required (advanced imaging)  High risk treatment options (thrombolytics and/or thrombectomy)    Patient care was coordinated with spoke provider, including but not limted to    Discussing likely diagnosis/etiology of symptoms  Making recommendations for further diagnostic studies  Making recommendations for intravenous thrombolytics or other advanced therapies  Making recommendations for disposition (admission/transfer for higher level of care)      Neurology consultation requested by spoke provider. Audiovisual encounter  with the patient performed using a secure connection.  Results and impressions from the visit are documented on this note and were communicated to the consulting provider/team via direct communication. The note has been shared for addition to the patients electronic medical record.

## 2025-01-07 NOTE — SUBJECTIVE & OBJECTIVE
HPI:  64 y.o. male w/ lost vision loss in the right eye.    Current Outpatient Medications   Medication Instructions    azelastine (ASTELIN) 137 mcg, Nasal, 2 times daily    fluticasone propionate (FLONASE) 50 mcg/actuation nasal spray 1 spray, Each Nostril, Daily PRN    HYDROcodone-acetaminophen (NORCO) 5-325 mg per tablet 1 tablet, Oral, Every 8 hours PRN    ibuprofen (ADVIL,MOTRIN) 800 mg, Oral, 3 times daily PRN    levothyroxine (SYNTHROID) 75 mcg, Oral, Before breakfast, Needs appointment prior to future refills.    meclizine (ANTIVERT) 25 mg, Oral, 3 times daily PRN    tadalafiL (CIALIS) 10 mg, Oral, Daily PRN     Past Medical History:   Diagnosis Date    Allergy     Hypothyroidism, unspecified     Sleep apnea     Thyroid disease         Imaging personally reviewed & interpreted:  CT Brain: no evidence of early or subacute ischemic changes, intracerebral hemorrhage or hyperdense vessel signs  CTA Head & Neck: cervico-cephalic vasculature negative for any clear contributing large or medium vessel occlusion related to the patient's current presentation and no targets identified for acute or urgent reperfusion therapy       Assessment and plan:  Incomplete R eye monocular vision obscuration - able to count fingers in all quadrants except superior nasal. Pattern not very consistent with CRAO or BRAO.   Also c/o kaleidoscoping when he closes and opens his eye.  CTA negative, no clear ipsilateral atherosclerotic disease.  Recommend bedside fundoscopic eval to r/o gross hemorrhage.  Ok to start asa 81 mg.  Needs evaluation by ophthalmology to fully evaluate ddx  Given diagnostic uncertainty and partial obscuration / low severity of symptoms, I don't feel risk of thrombolysis outweighs any possible benefit.    Lytics recommendation: Thrombolytic therapy not recommended due to Mild Non-Disabling Symptoms    Thrombectomy recommendation: No; No large vessel occlusion identified on imaging   Placement recommendation:  pending further studies

## 2025-01-08 LAB
OHS QRS DURATION: 104 MS
OHS QTC CALCULATION: 464 MS

## 2025-01-08 NOTE — CONSULTS
"Consultation Report  Ophthalmology Service    Date: 01/07/2025    Reason for Consult: "vision loss"     History of Present Illness: Elliott Ayala is a 64 y.o. male with POHx of glasses wearing who presented to [facility] with acute vision loss in right eye.    Patient reports around 10 AM today suddenly noticed blurring in his vision in his right eye while watching TV. Patient endorses sudden worsening in his vision, denies noticing progressive worsening. Patient reports central blurriness of the vision in his right eye, states that the edges are a little more clear. Denies any ocular pain.    Patient presented to OSH with CTA not showing any accute occlusion.    Per chart review, patient has history of ocular hypertension, not currently on any drops.    POcularHx: Denies history of ocular problems. Patient denies wearing contact lenses. Patient denies previous trauma to the eye. Patient denies history of ocular surgeries.    Current eye gtts: Denies     Family Hx: Aunt with history of glaucoma. Denies family history of macular degeneration, or blindness. family history includes Diabetes in his maternal aunt, maternal uncle, and mother; Heart disease in his father.     PMHx:  has a past medical history of Allergy, Hypothyroidism, unspecified, Sleep apnea, and Thyroid disease.     PSurgHx:  has a past surgical history that includes Achilles tendon surgery; Eye surgery (Bilateral); and Colonoscopy (N/A, 3/14/2024).     Home Medications: Reviewed    Allergies: is allergic to codeine and penicillins.     Social:  reports that he has quit smoking. He has never used smokeless tobacco. He reports current alcohol use. He reports current drug use. Drug: Marijuana.     ROS: As per HPI    Ocular examination/Dilated fundus examination:  Base Eye Exam       Visual Acuity (Snellen - Linear)         Right Left    Dist cc 20/200 20/70    Dist ph cc 20/200 +1 20/20      Correction: Glasses              Tonometry (Tonopen, 10:42 PM)         " Right Left    Pressure 21 23              Pupils         Dark Light Shape React APD    Right 4 2 Round Sluggish +    Left 4 2 Round Brisk None              Extraocular Movement         Right Left     Full, Ortho Full, Ortho              Neuro/Psych       Oriented x3: Yes    Mood/Affect: Normal              Dilation       Both eyes: 1% Mydriacyl, 2.5% Phenylephrine @ 10:43 PM                  Slit Lamp and Fundus Exam       External Exam         Right Left    External Normal Normal              Slit Lamp Exam         Right Left    Lids/Lashes Dermatochalasis Dermatochalasis    Conjunctiva/Sclera White and quiet White and quiet    Cornea Clear Clear    Anterior Chamber Deep and formed Deep and formed    Iris Round and reactive Round and reactive    Lens Clear Clear    Anterior Vitreous Normal Normal              Fundus Exam         Right Left    Disc p&s p&s    C/D Ratio 0.1 0.1    Macula diffuse heme flat    Vessels tortuous, attenuated nml caliber and crossing    Periphery diffuse pre-retinal heme flat 360, no h/t/d                  CTA head (1/2025)  1. No acute intracranial abnormality.  Further evaluation with MRI could be performed, as clinically warranted.  2. Limited CTA head examination of the head secondary to poor contrast bolus timing/technique, without evidence of large vessel occlusion or high-grade stenosis in the proximal hbitcf-vo-Frdnip branches.  Consider further evaluation with MRA of the head, as clinically warranted.  3. Minimal atherosclerotic disease at the left carotid bifurcation without evidence of a hemodynamically significant stenosis, major branch occlusion, dissection or aneurysm in the neck arterial vasculature.  4. Redemonstration of a heterogeneous enhancing mass at the tail of the left parotid gland consistent with known Warthin's tumor, increased in size as compared to the prior soft tissue neck on 04/08/2022.  5. Slight interval increase in size of smaller bilateral enhancing  parotid nodular lesions, possibly additional Warthin's tumors.  6. Stable bilateral subcentimeter juxtapleural nodules, ranging 4-6 mm in size.  7. Stable fusiform dilatation of the proximal descending thoracic aorta, 4.0 cm.  8. Diffusely heterogeneous, multinodular thyroid gland.  Recommend correlation with nonemergent outpatient thyroid ultrasound, if not already performed.    Assessment/Plan:     1. Acute painless vision loss, right eye  2. C/f CRVO OD  - VA 20/200, + rAPD  - Ishihara SANDRINE (cannot read control plate) // 13/13  - Endorses vision loss, but denies diplopia, eye pain, tenderness over temporal artery, scalp tenderness, jaw/tongue claudication, or malaise  - CTA as above, without significant stenosis - lower suspicion for OIS  - Lipids obtained, patient with high total cholesterol, all else nml  - PT nml  - POCT glucose nml  - DFE showing diffuse retinal heme, more concentrated near macula, as well as tortuousity + attenuation    Patient's Best Contact Number: [phone]    Follow-up in retina clinic in 1 week    Curtis Hunter MD  [employer] Ophthalmology PGY2  01/07/2025  10:44 PM

## 2025-01-08 NOTE — ED PROVIDER NOTES
Encounter Date: 1/7/2025       History     Chief Complaint   Patient presents with    Eye Problem     Pt transfer from [facility] for Optho for blurry vision and decreased vision in the right eye. Pt denies pain     64-year-old male, complaining of sudden onset of vision loss to his right eye around 11:00 a.m. today.  Vision loss was largely painful.  Patient was seen at an outside facility where he had an emergent stroke evaluation, including a CTA that did not show any acute occlusion.  Vascular Neurology was also involved in the patient's care.  Ultimately decision made to send patient here for formal ophthalmology and neurology evaluation.  Patient reports that since the initial onset of his symptoms, the vision loss has improved in his right eye.  Reports that in the middle aspect of the vision, it is blurry in his clear in the periphery.  He has no extremity numbness or weakness.  No language or speech complaints.  External eye exam and IOP at OSH normal.  Visual field deficit noted to medial upper aspect of R eye.    The history is provided by the patient.     Review of patient's allergies indicates:   Allergen Reactions    Codeine     Penicillins      Past Medical History:   Diagnosis Date    Allergy     Hypothyroidism, unspecified     Sleep apnea     Thyroid disease      Past Surgical History:   Procedure Laterality Date    ACHILLES TENDON SURGERY      COLONOSCOPY N/A 3/14/2024    Procedure: COLONOSCOPY;  Surgeon: Andre Juan MD;  Location: [facility];  Service: Endoscopy;  Laterality: N/A;  ppm    EYE SURGERY Bilateral      Family History   Problem Relation Name Age of Onset    Diabetes Mother      Heart disease Father      Diabetes Maternal Aunt      Diabetes Maternal Uncle      Glaucoma Neg Hx      Lupus Neg Hx      Psoriasis Neg Hx      Melanoma Neg Hx       Social History     Tobacco Use    Smoking status: Former    Smokeless tobacco: Never    Tobacco comments:     quit over 10 years ago    Substance Use Topics    Alcohol use: Yes     Comment: social     Drug use: Yes     Types: Marijuana     Review of Systems    Physical Exam     Initial Vitals [01/07/25 2133]   BP Pulse Resp Temp SpO2   130/84 87 16 98.8 °F (37.1 °C) 99 %      MAP       --         Physical Exam    Nursing note and vitals reviewed.  Constitutional: He appears well-developed and well-nourished. He is not diaphoretic. No distress.   HENT: Mouth/Throat: Oropharynx is clear and moist.   Eyes: Conjunctivae and EOM are normal. Pupils are equal, round, and reactive to light. Right eye exhibits no chemosis, no discharge and no exudate. Right conjunctiva is not injected. Right conjunctiva has no hemorrhage.   Visual fields intact x 4   Cardiovascular:  Normal rate and regular rhythm.             Neurological: He is alert and oriented to person, place, and time.         ED Course   Procedures  Labs Reviewed - No data to display       Imaging Results    None          Medications - No data to display  Medical Decision Making  Right eye with acute painless vision loss    Differential diagnosis:  Central retinal vein occlusion central retinal artery occlusion, stroke, ischemic optic neuropathy    Ophthalmology consulted on arrival to the ED.  They have done a full evaluation and exam is consistent with central retinal vein occlusion.  There are no new medications or emergent interventions recommended for this problem.  Patient safe for discharge home and referral being made to outpatient Ophthalmology for follow up.    Amount and/or Complexity of Data Reviewed  External Data Reviewed: notes.                                      Clinical Impression:  Final diagnoses:  [H34.8112] Central retinal vein occlusion of right eye, unspecified complication status (Primary)          ED Disposition Condition    Discharge Stable          ED Prescriptions    None       Follow-up Information    None          Netta De La O MD  01/08/25 9323     n/a

## 2025-01-08 NOTE — DISCHARGE INSTRUCTIONS
You have a central retinal vein occlusion in your right eye.  This is the reason for your vision loss in your right eye.  Please follow up with the ophthalmologist within the next week, you need to see a retinal specialist.  There are no new medications that you need for this problem.

## 2025-01-16 ENCOUNTER — OFFICE VISIT (OUTPATIENT)
Dept: OPHTHALMOLOGY | Facility: CLINIC | Age: 65
End: 2025-01-16
Payer: MEDICARE

## 2025-01-16 ENCOUNTER — CLINICAL SUPPORT (OUTPATIENT)
Dept: OPHTHALMOLOGY | Facility: CLINIC | Age: 65
End: 2025-01-16
Payer: MEDICARE

## 2025-01-16 DIAGNOSIS — H25.13 AGE-RELATED NUCLEAR CATARACT OF BOTH EYES: ICD-10-CM

## 2025-01-16 DIAGNOSIS — H34.8110 CENTRAL RETINAL VEIN OCCLUSION WITH MACULAR EDEMA OF RIGHT EYE: ICD-10-CM

## 2025-01-16 DIAGNOSIS — H35.033 HYPERTENSIVE RETINOPATHY OF BOTH EYES: Primary | ICD-10-CM

## 2025-01-16 PROCEDURE — 67028 INJECTION EYE DRUG: CPT | Mod: PBBFAC,RT | Performed by: OPHTHALMOLOGY

## 2025-01-16 PROCEDURE — 67028 INJECTION EYE DRUG: CPT | Mod: S$PBB,RT,, | Performed by: OPHTHALMOLOGY

## 2025-01-16 PROCEDURE — 92134 CPTRZ OPH DX IMG PST SGM RTA: CPT | Mod: PBBFAC | Performed by: OPHTHALMOLOGY

## 2025-01-16 PROCEDURE — 99213 OFFICE O/P EST LOW 20 MIN: CPT | Mod: PBBFAC | Performed by: OPHTHALMOLOGY

## 2025-01-16 PROCEDURE — 99204 OFFICE O/P NEW MOD 45 MIN: CPT | Mod: 25,S$PBB,, | Performed by: OPHTHALMOLOGY

## 2025-01-16 PROCEDURE — 99999PBSHW PR PBB SHADOW TECHNICAL ONLY FILED TO HB: Mod: JZ,PBBFAC,,

## 2025-01-16 PROCEDURE — 99999 PR PBB SHADOW E&M-EST. PATIENT-LVL III: CPT | Mod: PBBFAC,,, | Performed by: OPHTHALMOLOGY

## 2025-01-16 RX ADMIN — Medication 1.25 MG: at 03:01

## 2025-01-16 NOTE — PROGRESS NOTES
HPI    Pt here today for CRVO consult. Pt states on 1/7  he experience vision   loss to his right eye around 11:00 a.m. Today, pt states vision is slowly   coming back. Pt denies eye pain.  Last edited by Lu Hagan MA on 1/16/2025  1:36 PM.         A/P    ICD-10-CM ICD-9-CM   1. Hypertensive retinopathy of both eyes  H35.033 362.11   2. Central retinal vein occlusion with macular edema of right eye  H34.8110 362.35     362.83   3. Age-related nuclear cataract of both eyes  H25.13 366.16       1. Central retinal vein occlusion with macular edema of right eye  ED f/u for vision loss - Recent notes reviewed  PCP Colin Ramirez PA-C - Recent notes reviewed  11/21/2023  5.8  A1C   F/b heme/onc for colonic mass - needs to have Pet scan scheduled    Exam notable for CRVO OD with IRF/SRF and diffuse heme no NV  Plan: could be due to hypercoag state given colonic mass. Recommend Avastin OD today for significant foveal IRF/SRF, stressed f/u with heme/onc team    Based on todays exam, diagnostic studies, and review of records, the determination was made for treatment today.  Schedule Avastin Injection today Right Eye Patient chooses to proceed with injection R/B/A discussed include infection retinal detachment and stroke    Patient identified.  Timeout performed.    Risks, benefits, and alternatives to treatment were discussed in detail with the patient, including bleeding/infection (endophthalmitis)/etc.  The patient voiced understanding and wished to proceed with the procedure.  See separate consent form.    Injection Procedure Note:  Diagnosis: CME Right Eye    Topical Proparacaine drop placed then topical 5% Betadine and then subconjunctival lidocaine 2% injection  Sterile gloves used, and sterile lid speculum placed.  5% Betadine placed at injection site again prior to injection.  Avastin 1.25mg in 0.05cc Injected inferotemporally 3.5-4mm posterior to the limbus.  Complications: None  Va at least CF at 5 feet  post injection.  Retina, ONH, IOP normal after injection.    Followup as below.  Patient should return immediately PRN.  Retinal Detachment and Endophthalmitis precautions given.     2. Hypertensive retinopathy of both eyes  Mod HTN changes, has RVO OD  Plan: Observation  Recommend good blood pressure control, tight blood glucose control, and good cholesterol control     3. Age-related nuclear cataract of both eyes  Mild NS, NVS  Plan: Observation     RTC 1 mo DFE/OCTm OU, poss Avastin OD        I saw and examined the patient and reviewed in detail the findings documented. The final examination findings, image interpretations which have been independently interpreted, and plan as documented in the record represent my personal judgment and conclusions.    Wes Linton MD  Vitreoretinal Surgery   [facility]

## 2025-02-04 ENCOUNTER — LAB VISIT (OUTPATIENT)
Dept: LAB | Facility: HOSPITAL | Age: 65
End: 2025-02-04
Payer: MEDICARE

## 2025-02-04 ENCOUNTER — OFFICE VISIT (OUTPATIENT)
Dept: FAMILY MEDICINE | Facility: CLINIC | Age: 65
End: 2025-02-04
Payer: MEDICARE

## 2025-02-04 VITALS
RESPIRATION RATE: 18 BRPM | OXYGEN SATURATION: 97 % | HEIGHT: 68 IN | BODY MASS INDEX: 43.01 KG/M2 | DIASTOLIC BLOOD PRESSURE: 78 MMHG | HEART RATE: 88 BPM | SYSTOLIC BLOOD PRESSURE: 128 MMHG | WEIGHT: 283.75 LBS

## 2025-02-04 DIAGNOSIS — R73.03 PREDIABETES: ICD-10-CM

## 2025-02-04 DIAGNOSIS — Z12.5 SCREENING FOR MALIGNANT NEOPLASM OF PROSTATE: ICD-10-CM

## 2025-02-04 DIAGNOSIS — E78.5 DYSLIPIDEMIA: ICD-10-CM

## 2025-02-04 DIAGNOSIS — Z12.5 SCREENING FOR MALIGNANT NEOPLASM OF PROSTATE: Primary | ICD-10-CM

## 2025-02-04 DIAGNOSIS — C77.2 COLON CANCER METASTASIZED TO MESENTERIC LYMPH NODES: ICD-10-CM

## 2025-02-04 DIAGNOSIS — C18.9 COLON CANCER METASTASIZED TO MESENTERIC LYMPH NODES: ICD-10-CM

## 2025-02-04 DIAGNOSIS — E66.01 MORBID OBESITY WITH BMI OF 40.0-44.9, ADULT: ICD-10-CM

## 2025-02-04 DIAGNOSIS — N52.9 ERECTILE DYSFUNCTION, UNSPECIFIED ERECTILE DYSFUNCTION TYPE: ICD-10-CM

## 2025-02-04 DIAGNOSIS — E03.9 HYPOTHYROIDISM, UNSPECIFIED TYPE: ICD-10-CM

## 2025-02-04 DIAGNOSIS — D11.9 WARTHIN'S TUMOR: ICD-10-CM

## 2025-02-04 LAB
COMPLEXED PSA SERPL-MCNC: 3.7 NG/ML (ref 0–4)
ESTIMATED AVG GLUCOSE: 108 MG/DL (ref 68–131)
HBA1C MFR BLD: 5.4 % (ref 4–5.6)

## 2025-02-04 PROCEDURE — 84153 ASSAY OF PSA TOTAL: CPT

## 2025-02-04 PROCEDURE — 83036 HEMOGLOBIN GLYCOSYLATED A1C: CPT

## 2025-02-04 PROCEDURE — 99999 PR PBB SHADOW E&M-EST. PATIENT-LVL III: CPT | Mod: PBBFAC,,,

## 2025-02-04 PROCEDURE — 99213 OFFICE O/P EST LOW 20 MIN: CPT | Mod: PBBFAC,PO

## 2025-02-04 PROCEDURE — 99214 OFFICE O/P EST MOD 30 MIN: CPT | Mod: S$PBB,,,

## 2025-02-04 PROCEDURE — G2211 COMPLEX E/M VISIT ADD ON: HCPCS | Mod: S$PBB,,,

## 2025-02-04 RX ORDER — LEVOTHYROXINE SODIUM 75 UG/1
75 TABLET ORAL
Qty: 30 TABLET | Refills: 1 | Status: SHIPPED | OUTPATIENT
Start: 2025-02-04 | End: 2025-02-04

## 2025-02-04 RX ORDER — TADALAFIL 10 MG/1
10 TABLET ORAL DAILY PRN
Qty: 30 TABLET | Refills: 2 | Status: SHIPPED | OUTPATIENT
Start: 2025-02-04 | End: 2026-02-04

## 2025-02-04 RX ORDER — LEVOTHYROXINE SODIUM 75 UG/1
75 TABLET ORAL
Qty: 90 TABLET | Refills: 3 | Status: SHIPPED | OUTPATIENT
Start: 2025-02-04

## 2025-02-04 NOTE — PROGRESS NOTES
Subjective:       Patient ID: Elliott Ayala is a 64 y.o. male.    Chief Complaint: Medication Refill    Medication Refill        History of Present Illness    CHIEF COMPLAINT:  Patient presents today for medication refill    OPHTHALMOLOGIC:  He reports a recent emergency room visit due to retinal hemorrhage which initially caused complete vision loss. Currently experiencing blurry vision with spots in visual field. He is receiving eye injections with next treatment scheduled for the 19th.    CANCER MANAGEMENT:  He is working with a holistic provider for colon cancer management. He has implemented significant dietary changes including avoiding red meat, pork, and seafood while increasing vegetable consumption. He has started juicing regularly.    WEIGHT MANAGEMENT:  He reports weight loss from 320 lbs to 283 lbs over the past year through dietary changes and increased physical activity.    GENITOURINARY:  He reports increased frequency of nighttime urination. Bowel movements are regular with no other urinary or bowel symptoms.    NEUROLOGIC:  He experiences episodes of vertigo with sensation of room spinning.    PSYCHIATRIC:  He experiences anxiety, particularly in enclosed spaces. Previously used cannabis for anxiety management but discontinued due to adverse effects.    PAST MEDICAL HISTORY:  He has a history of back problems from years ago, currently asymptomatic.      ROS:  General: positive weight loss  Eyes: positive vision changes  Genitourinary: positive nocturia  Neurological: positive dizziness  Psychiatric: positive anxiety          Past Medical History:   Diagnosis Date    Allergy     Hypothyroidism, unspecified     Sleep apnea     Thyroid disease        Review of patient's allergies indicates:   Allergen Reactions    Codeine     Penicillins          Current Outpatient Medications:     azelastine (ASTELIN) 137 mcg (0.1 %) nasal spray, 1 spray (137 mcg total) by Nasal route 2 (two) times daily., Disp: 30  "mL, Rfl: 2    fluticasone propionate (FLONASE) 50 mcg/actuation nasal spray, 1 spray by Each Nostril route daily as needed for Rhinitis., Disp: , Rfl:     levothyroxine (SYNTHROID) 75 MCG tablet, Take 1 tablet (75 mcg total) by mouth before breakfast., Disp: 90 tablet, Rfl: 3    tadalafiL (CIALIS) 10 MG tablet, Take 1 tablet (10 mg total) by mouth daily as needed for Erectile Dysfunction., Disp: 30 tablet, Rfl: 2    Review of Systems    Objective:      /78 (BP Location: Right arm, Patient Position: Sitting)   Pulse 88   Resp 18   Ht 5' 8" (1.727 m)   Wt 128.7 kg (283 lb 11.7 oz)   SpO2 97%   BMI 43.14 kg/m²   Physical Exam  Vitals reviewed.   Constitutional:       General: He is not in acute distress.     Appearance: Normal appearance. He is obese. He is not ill-appearing, toxic-appearing or diaphoretic.   HENT:      Head: Normocephalic.      Right Ear: External ear normal.      Left Ear: External ear normal.      Nose: Nose normal. No congestion or rhinorrhea.      Mouth/Throat:      Mouth: Mucous membranes are moist.      Pharynx: Oropharynx is clear.   Eyes:      General: No scleral icterus.        Right eye: No discharge.         Left eye: No discharge.      Extraocular Movements: Extraocular movements intact.      Conjunctiva/sclera: Conjunctivae normal.   Cardiovascular:      Rate and Rhythm: Normal rate and regular rhythm.      Pulses: Normal pulses.      Heart sounds: Normal heart sounds. No murmur heard.     No friction rub. No gallop.   Pulmonary:      Effort: Pulmonary effort is normal. No respiratory distress.      Breath sounds: Normal breath sounds. No wheezing, rhonchi or rales.   Chest:      Chest wall: No tenderness.   Abdominal:      Palpations: Abdomen is soft. There is no mass.      Tenderness: There is no abdominal tenderness.   Musculoskeletal:         General: No swelling, tenderness or deformity. Normal range of motion.      Cervical back: Normal range of motion.      Right lower " leg: No edema.      Left lower leg: No edema.   Skin:     General: Skin is warm and dry.      Capillary Refill: Capillary refill takes less than 2 seconds.      Coloration: Skin is not jaundiced.      Findings: No bruising, erythema, lesion or rash.   Neurological:      Mental Status: He is alert and oriented to person, place, and time.   Psychiatric:         Mood and Affect: Mood normal.         Behavior: Behavior normal.         Thought Content: Thought content normal.         Judgment: Judgment normal.             Assessment:       1. Screening for malignant neoplasm of prostate    2. Hypothyroidism, unspecified type    3. Prediabetes    4. Warthin's tumor    5. Dyslipidemia    6. Erectile dysfunction, unspecified erectile dysfunction type    7. Morbid obesity with BMI of 40.0-44.9, adult    8. Colon cancer metastasized to mesenteric lymph nodes          Assessment & Plan    IMPRESSION:  - Assessed eye condition: patient reports bleeding in the back of the eye, blurry vision, receiving shots for treatment  - Evaluated thyroid function: previously checked and stable, no recheck needed  - Considering prostate cancer screening due to increased urinary frequency  - Monitoring colon cancer: patient pursuing holistic treatment, diet changes implemented  - Ordered PET scan to evaluate extent of colon cancer and potential metastasis  - Noted significant weight loss of 40 lbs in the past year  - Considering anxiolytic for upcoming PET scan procedure    PLAN SUMMARY:  - PET scan scheduled for after patient's birthday in April to evaluate lymph node involvement and disease extent  - Blood sugar labs ordered  - PSA test ordered to screen for prostate issues  - Cialis refilled  - Thyroid medication refilled for 90 days with refills  - Follow-up appointment scheduled in 6 months  - Patient to continue current diet changes and weight loss efforts  - Anti-anxiety medication offered for PET scan if needed    COLON CANCER:  -  "Assessed the patient's condition, noting no persistent cough, hemoptysis, or concerning symptoms.  - No reported changes in bowel habits.  - Recommend a PET scan to evaluate the extent of the disease and potential metastasis to lymph nodes and other locations.  - Explained PET scan procedure, emphasizing its open "donut" design, not a closed tube.  - Scheduled PET scan for after the patient's birthday in April.  - Monitored the patient's colon cancer for potential metastasis to lymph nodes.  - Recommend a PET scan to assess the extent of the disease, including potential lymph node involvement.  - Scheduled PET scan for after the patient's birthday in April to evaluate potential lymph node involvement.    WEIGHT MANAGEMENT:  - Monitored the patient's weight, noting a decrease from 320 lbs in May to 283 lbs currently, resulting in a 40-pound weight loss over the past year.  - Acknowledged the patient's morbid obesity and recommended continued weight loss efforts.  - Noted the patient reports increased walking and dietary changes, including increased vegetable intake and juicing.  - Encouraged the patient to continue with current weight loss efforts.    RETINAL HEMORRHAGE:  - Noted the patient's recent emergency room visit for retinal hemorrhage, resulting in vision impairment.  - Evaluated the patient's current vision status, noting improvement but persistent blurriness and spots.  - Acknowledged the eye condition and its impact on the patient.  - Noted the patient is receiving eye injections as treatment and has a follow-up visit scheduled.  - Planned to check blood sugar to rule out potential contributing factors.    THYROID DISORDER:  - Reviewed the patient's thyroid status, noting recent check showed good results.  - Evaluated thyroid function, which is reported as satisfactory and does not need rechecking at this time.  - Refilled thyroid medication for 90 days with refills.    URINARY FREQUENCY:  - Monitored the " patient's urinary frequency, noting an increase.  - Discussed natural enlargement of prostate with age and its potential impact on urinary frequency.  - Ordered PSA test to screen for prostate issues.  - Refilled Cialis.    ANXIETY:  - Evaluated the patient's anxiety about potential closed spaces during medical procedures.  - Acknowledged the patient's anxiety and offered reassurance about the PET scan procedure.  - Offered to prescribe anti-anxiety medication for the upcoming   PET scan procedure if needed.  - Advised the patient to contact the office if an anxiolytic is needed before the PET scan.    DIETARY CHANGES:  - Monitored the patient's dietary changes, including eliminating red meat and pork, increasing vegetable intake, and juicing.  - Acknowledged the patient's dietary changes as part of a holistic treatment approach.  - Patient to continue current diet changes: avoiding red meat and pork, increasing vegetable intake.  - Noted the patient is working with a holistic provider and has made dietary changes including eliminating red meat and pork consumption, increasing vegetable intake, and juicing.    LABS:  - Blood sugar labs ordered.    FOLLOW UP:  - Scheduled follow up in 6 months.  - Message directly if there are any issues with appointment scheduling or medication refills.          Plan:       Screening for malignant neoplasm of prostate  -     PSA, SCREENING; Future; Expected date: 02/04/2025    Hypothyroidism, unspecified type  -     Discontinue: levothyroxine (SYNTHROID) 75 MCG tablet; Take 1 tablet (75 mcg total) by mouth before breakfast. Needs appointment prior to future refills.  Dispense: 30 tablet; Refill: 1  -     levothyroxine (SYNTHROID) 75 MCG tablet; Take 1 tablet (75 mcg total) by mouth before breakfast.  Dispense: 90 tablet; Refill: 3    Prediabetes  -     Hemoglobin A1C; Future; Expected date: 02/04/2025    Warthin's tumor    Dyslipidemia    Erectile dysfunction, unspecified erectile  dysfunction type  -     tadalafiL (CIALIS) 10 MG tablet; Take 1 tablet (10 mg total) by mouth daily as needed for Erectile Dysfunction.  Dispense: 30 tablet; Refill: 2    Morbid obesity with BMI of 40.0-44.9, adult    Colon cancer metastasized to mesenteric lymph nodes                   Colin Ramirez PA-C  Family Medicine Physician Assistant       Future Appointments       Date Provider Specialty Appt Notes    2/19/2025 Wes Linton MD Ophthalmology 1M DFE/ MOCT O U /ELVIA OD    8/5/2025 Colin Ramirez PA-C Family Medicine 6 month f/u               I spent a total of 20 minutes on the day of the visit.This includes face to face time and non-face to face time preparing to see the patient (eg, review of tests), obtaining and/or reviewing separately obtained history, documenting clinical information in the electronic or other health record, independently interpreting results and communicating results to the patient/family/caregiver, or care coordinator.      We have addressed [4] Moderate: 1 or more chronic illnesses with exacerbation, progression, or side effects of treatment / 2 or more stable chronic illnesses / 1 undiagnosed new problem with uncertain prognosis / 1 acute illness with systemic symptoms / 1 acute complicated injury  The complexity of the data reviewed and analyzed for this visit was [3] Limited (Reviewed prior external note, ordered unique testing or reviewed the results of each unique test)   The risk of complications and/or morbidity or mortality are [4] Moderate risk (I.e. prescription drug management / decision regarding minor surgery with identified pt or procedure risk factors / decision regarding elective major surgery without identified pt or procedure risk factors / diagnosis or treatment significantly limited by social determinants of health)   The level of Medical Decision Making for this visit is [4] Moderate     This note may have been generated with the assistance of ambient  listening technology. If used, verbal consent was obtained by the patient and accompanying visitor(s) for the recording of patient appointment to facilitate this note. I attest to having reviewed and edited the generated note for accuracy, though some syntax or spelling errors may persist. Please contact the author of this note for any clarification.

## 2025-02-18 NOTE — PROGRESS NOTES
HPI    DLS: 1/16/2025 pt present for 1mo DFE/OCT poss GABE OD    Pt states feels like vision is clearing up some.  Denies pain/FOL/floaters    Last edited by Tamara Soto on 2/19/2025  9:14 AM.          A/P    ICD-10-CM ICD-9-CM   1. Central retinal vein occlusion with macular edema of right eye  H34.8110 362.35     362.83   2. Hypertensive retinopathy of both eyes  H35.033 362.11   3. Age-related nuclear cataract of both eyes  H25.13 366.16         1. Central retinal vein occlusion with macular edema of right eye  PCP Colin Ramirez PA-C - Recent notes reviewed  02/04/2025  5.4  A1C   F/b heme/onc for colonic mass - has Pet scan scheduled    S/p GABE 1/16/25    Exam notable for CRVO OD with better IRF/SRF and diffuse heme  better  Plan: recommend Avastin OD today, will push to 6 weeks    Based on todays exam, diagnostic studies, and review of records, the determination was made for treatment today.  Schedule Avastin Injection today Right Eye Patient chooses to proceed with injection R/B/A discussed include infection retinal detachment and stroke    Patient identified.  Timeout performed.    Risks, benefits, and alternatives to treatment were discussed in detail with the patient, including bleeding/infection (endophthalmitis)/etc.  The patient voiced understanding and wished to proceed with the procedure.  See separate consent form.    Injection Procedure Note:  Diagnosis: CME Right Eye    Topical Proparacaine drop placed then topical 5% Betadine and then subconjunctival lidocaine 2% injection  Sterile gloves used, and sterile lid speculum placed.  5% Betadine placed at injection site again prior to injection.  Avastin 1.25mg in 0.05cc Injected inferotemporally 3.5-4mm posterior to the limbus.  Complications: None  Va at least CF at 5 feet post injection.  Retina, ONH, IOP normal after injection.    Followup as below.  Patient should return immediately PRN.  Retinal Detachment and Endophthalmitis precautions  given.     2. Hypertensive retinopathy of both eyes  Mod HTN changes, has RVO OD  Plan: Observation  Recommend good blood pressure control, tight blood glucose control, and good cholesterol control     3. Age-related nuclear cataract of both eyes  Mild NS, NVS  Plan: will refer for updated Mrx    RTC 6 weeks DFE/OCTm OU, poss Avastin OD        I saw and examined the patient and reviewed in detail the findings documented. The final examination findings, image interpretations which have been independently interpreted, and plan as documented in the record represent my personal judgment and conclusions.    Wes Linton MD  Vitreoretinal Surgery   [facility]

## 2025-02-19 ENCOUNTER — OFFICE VISIT (OUTPATIENT)
Dept: OPHTHALMOLOGY | Facility: CLINIC | Age: 65
End: 2025-02-19
Payer: MEDICARE

## 2025-02-19 DIAGNOSIS — H25.13 AGE-RELATED NUCLEAR CATARACT OF BOTH EYES: ICD-10-CM

## 2025-02-19 DIAGNOSIS — H35.033 HYPERTENSIVE RETINOPATHY OF BOTH EYES: ICD-10-CM

## 2025-02-19 DIAGNOSIS — H34.8110 CENTRAL RETINAL VEIN OCCLUSION WITH MACULAR EDEMA OF RIGHT EYE: Primary | ICD-10-CM

## 2025-02-19 PROCEDURE — 67028 INJECTION EYE DRUG: CPT | Mod: PBBFAC,PO,RT | Performed by: OPHTHALMOLOGY

## 2025-02-19 PROCEDURE — 92134 CPTRZ OPH DX IMG PST SGM RTA: CPT | Mod: PBBFAC,PO | Performed by: OPHTHALMOLOGY

## 2025-02-19 PROCEDURE — 99212 OFFICE O/P EST SF 10 MIN: CPT | Mod: PBBFAC,PO | Performed by: OPHTHALMOLOGY

## 2025-02-19 RX ORDER — CLINDAMYCIN HYDROCHLORIDE 150 MG/1
CAPSULE ORAL
COMMUNITY
Start: 2024-03-27

## 2025-02-19 RX ADMIN — Medication 1.25 MG: at 09:02

## 2025-02-22 DIAGNOSIS — Z00.00 ENCOUNTER FOR MEDICARE ANNUAL WELLNESS EXAM: ICD-10-CM

## 2025-04-02 ENCOUNTER — OFFICE VISIT (OUTPATIENT)
Dept: OPHTHALMOLOGY | Facility: CLINIC | Age: 65
End: 2025-04-02
Payer: MEDICARE

## 2025-04-02 DIAGNOSIS — H35.033 HYPERTENSIVE RETINOPATHY OF BOTH EYES: ICD-10-CM

## 2025-04-02 DIAGNOSIS — H34.8110 CENTRAL RETINAL VEIN OCCLUSION WITH MACULAR EDEMA OF RIGHT EYE: Primary | ICD-10-CM

## 2025-04-02 DIAGNOSIS — H25.13 AGE-RELATED NUCLEAR CATARACT OF BOTH EYES: ICD-10-CM

## 2025-04-02 PROCEDURE — 92202 OPSCPY EXTND ON/MAC DRAW: CPT | Mod: 59,PBBFAC,PO | Performed by: OPHTHALMOLOGY

## 2025-04-02 PROCEDURE — 99999 PR PBB SHADOW E&M-EST. PATIENT-LVL II: CPT | Mod: PBBFAC,,, | Performed by: OPHTHALMOLOGY

## 2025-04-02 PROCEDURE — 99212 OFFICE O/P EST SF 10 MIN: CPT | Mod: PBBFAC,PO | Performed by: OPHTHALMOLOGY

## 2025-04-02 PROCEDURE — 92134 CPTRZ OPH DX IMG PST SGM RTA: CPT | Mod: PBBFAC,PO | Performed by: OPHTHALMOLOGY

## 2025-04-02 NOTE — PROGRESS NOTES
HPI    DLS: 2/19/2025 ME w/ Possible GABE OD     Pt states no new complaints.     Denies pain/ FOL/ floaters.     No gtts.     Last edited by Gerri Alcantara on 4/2/2025  9:14 AM.     HPI    DLS: 2/19/2025 ME w/ Possible GABE OD     Pt states no new complaints.     Denies pain/ FOL/ floaters.     No gtts.     Last edited by Gerri Alcantara on 4/2/2025  9:14 AM.         A/P    ICD-10-CM ICD-9-CM   1. Central retinal vein occlusion with macular edema of right eye  H34.8110 362.35     362.83   2. Hypertensive retinopathy of both eyes  H35.033 362.11   3. Age-related nuclear cataract of both eyes  H25.13 366.16           1. Central retinal vein occlusion with macular edema of right eye  PCP Colin Ramirez PA-C - Recent notes reviewed  02/04/2025  5.4  A1C   F/b heme/onc for colonic mass - has to have Pet scan scheduled    S/p GABE x2 2/19/25    Exam notable for CRVO OD with better IRF/SRF   Plan: given rare IRF and improved retinal thickness, likely macular ischemia impacting vision, monitor for now, recheck 2-3 mo if needs repeat injection    Recommend good blood pressure control, tight blood glucose control, and good cholesterol control     Visit today is associated with current or anticipated ongoing medical care related to this patients single serious condition/complex condition (central retinal vein occlusion)     2. Hypertensive retinopathy of both eyes  Mod HTN changes, has RVO OD  Plan: Observation  Recommend good blood pressure control, tight blood glucose control, and good cholesterol control     3. Age-related nuclear cataract of both eyes  Mild NS, NVS  Plan: Observation     RTC 2-3 mo DFE/OCTm OU, poss Avastin OD        I saw and examined the patient and reviewed in detail the findings documented. The final examination findings, image interpretations which have been independently interpreted, and plan as documented in the record represent my personal judgment and conclusions.    Wes Linton,  MD  Vitreoretinal Surgery   [facility]

## 2025-04-04 ENCOUNTER — OFFICE VISIT (OUTPATIENT)
Dept: PODIATRY | Facility: CLINIC | Age: 65
End: 2025-04-04
Payer: MEDICARE

## 2025-04-04 VITALS — BODY MASS INDEX: 43.23 KG/M2 | HEIGHT: 68 IN | RESPIRATION RATE: 16 BRPM | WEIGHT: 285.25 LBS

## 2025-04-04 DIAGNOSIS — M79.672 LEFT FOOT PAIN: ICD-10-CM

## 2025-04-04 DIAGNOSIS — M67.479 GANGLION CYST OF FOOT: Primary | ICD-10-CM

## 2025-04-04 PROCEDURE — 99213 OFFICE O/P EST LOW 20 MIN: CPT | Mod: PBBFAC,PN | Performed by: PODIATRIST

## 2025-04-04 PROCEDURE — 99999 PR PBB SHADOW E&M-EST. PATIENT-LVL III: CPT | Mod: PBBFAC,,, | Performed by: PODIATRIST

## 2025-04-04 NOTE — PATIENT INSTRUCTIONS
Family Medicine      Patient Name: Ashley Pierce  MRN: [id number]  : 1941    PCP: Oneyda Joel MD       HPI      Ashley Pierce is a 83 y.o. female with multiple medical diagnoses as listed in the medical history and problem list that presents for   Chief Complaint   Patient presents with    Abdominal Pain     X 1.5 weeks       HPI    History of Present Illness    Patient presents today with abdominal pain and lumps. She reports sharp, brief, knife-like muscular abdominal pain that feels like lumps under the skin, occurring with certain movements. The pain appears to originate in the abdomen and radiates to the back. She denies any association with digestive issues. Last labs were done in October showing normal kidney function. Labs are routinely performed every 6 months.    She has similar pain and lumps in her right leg. She notes asymmetric leg swelling. An ultrasound was performed for leg pain about one year ago for the same complaint. US was unremarkable. She has a history of bilateral ankle fractures - R approximately 6 months ago and L about 10 years ago. She notes asymmetric leg swelling. She denies any history of blood clots. Last labs were done in October showing normal kidney function. Labs are routinely performed every 6 months.      ROS:  General: -fever, -chills, -fatigue, -weight gain, -weight loss  Eyes: -vision changes, -redness, -discharge  ENT: -ear pain, -nasal congestion, -sore throat  Cardiovascular: -chest pain, -palpitations, -lower extremity edema  Respiratory: -cough, -shortness of breath  Gastrointestinal: +abdominal pain, -nausea, -vomiting, -diarrhea, -constipation, -blood in stool  Genitourinary: -dysuria, -hematuria, -frequency  Musculoskeletal: -joint pain, +muscle pain  Skin: -rash, -lesion  Neurological: -headache, -dizziness, -numbness, -tingling  Psychiatric: -anxiety, -depression, -sleep difficulty              History      Past Medical  Ganglion Cyst  What Is a Ganglion Cyst?  A ganglion cyst is a sac filled with a jellylike fluid that originates from a tendon sheath or joint capsule. The word ganglion means knot and is used to describe the knot-like mass or lump that forms below the surface of the skin.    Ganglion cysts are among the most common benign soft- tissue masses. Although they most often occur on the wrist, they also frequently develop on the foot--usually on the top, but elsewhere as well. Ganglion cysts vary in size, may get smaller and larger and may even disappear completely, only to return later. Several foot ganglion cyst treatment options available.          Causes of Ganglion Cysts  Although the exact cause of ganglion cysts is unknown, they may arise from trauma--whether a single event or repetitive microtrauma.    Symptoms of Ganglion Cysts  A ganglion cyst is associated with one or more of the following symptoms:  A noticeable lump--often this is the only symptom experienced  Tingling or burning, if the cyst is touching a nerve  Dull pain or ache, which may indicate the cyst is pressing against a tendon or joint  Difficulty wearing shoes due to irritation between the lump and the shoe    Diagnosis of Ganglion Cysts  To diagnose a ganglion cyst, the foot and ankle surgeon will perform a thorough examination of the foot. The lump will be visually apparent, and, when pressed in a certain way, it should move freely underneath the skin. Sometimes the surgeon will shine a light through the cyst or remove a small amount of fluid from the cyst for evaluation. Your doctor may take an x-ray, and in some cases, additional imaging studies may be ordered.    Nonsurgical Treatment  There are various options for treating a ganglion cyst on the foot:  Monitoring but no treatment. If the cyst causes no pain and does not interfere with walking, the surgeon may decide it is best to carefully watch the cyst over a period of time.  Shoe  History:  Past Medical History:   Diagnosis Date    Allergy     Back pain     Bilateral renal masses     u/s 2013    Chronic bronchitis     Chronic kidney disease, stage II (mild)     Chronic obstructive asthma, unspecified (external [facility] documentation 10-29-12) 2015    Degenerative arthritis of lumbar spine 2012    Degenerative arthritis of lumbar spine     Degenerative disc disease     cervical    Diabetes mellitus type II, uncontrolled 2012    Essential hypertension 2022    Hyperlipidemia     Hyperlipidemia, acquired 2012    Major depressive disorder, recurrent, in partial remission 2022    Non morbid obesity due to excess calories     Paralysis of diaphragm 2012    Type 2 diabetes mellitus     Urinary incontinence        Past Surgical History:  Past Surgical History:   Procedure Laterality Date    ANKLE SURGERY Left     left ankle     BLADDER SUSPENSION      COLONOSCOPY N/A 2020    Procedure: COLONOSCOPY;  Surgeon: Sarbjit Pearce MD;  Location: [facility];  Service: Endoscopy;  Laterality: N/A;    COSMETIC SURGERY  2008    bilateral eyes     ESOPHAGOGASTRODUODENOSCOPY N/A 2022    Procedure: EGD (ESOPHAGOGASTRODUODENOSCOPY);  Surgeon: Kaye Molina MD;  Location: [facility];  Service: Endoscopy;  Laterality: N/A;  fully vaccinated    GALLBLADDER SURGERY      HYSTERECTOMY      OOPHORECTOMY         Social History:  Social History     Socioeconomic History    Marital status:    Tobacco Use    Smoking status: Former     Current packs/day: 0.00     Average packs/day: 1 pack/day for 30.0 years (30.0 ttl pk-yrs)     Types: Cigarettes     Start date: 10/14/1961     Quit date: 10/14/1991     Years since quittin.2    Smokeless tobacco: Never   Substance and Sexual Activity    Alcohol use: Not Currently     Alcohol/week: 0.0 standard drinks of alcohol    Drug use: No    Sexual activity: Yes     Partners: Male   Social History Narrative    Adult Screenings updated  modifications. Wearing shoes that do not rub the cyst or cause irritation may be advised. In addition, placing a pad inside the shoe may help reduce pressure against the cyst.  Aspiration and injection. This technique involves draining the fluid and then injecting a steroid medication into the mass. More than one session may be needed. Although this approach is successful in some cases, in many others, the cyst returns.    When Is Surgery Needed?  When other treatment options fail or are not appropriate, the cyst may need to be surgically removed. While the recurrence rate associated with surgery is much lower than that experienced with aspiration and injection therapy, there are nevertheless cases in which the ganglion cyst returns.    Why choose a foot and ankle surgeon?  Foot and ankle surgeons are the leading experts in foot and ankle care today. As doctors of podiatric medicine - also known as podiatrists, DPMs or occasionally foot and ankle doctors - they are the board-certified surgical specialists of the podiatric profession. Foot and ankle surgeons have more education and training specific to the foot and ankle than any other healthcare provider.    Foot and ankle surgeons treat all conditions affecting the foot and ankle, from the simple to the complex, in patients of all ages including ganglion cysts. Their intensive education and training qualify foot and ankle surgeons to perform a wide range of surgeries, including any surgery that may be indicated for ganglion cysts.          and reviewed  4/9/14    Mammogram( for females) 2013    Pap ( for females) 2013 DR Jung     Colonoscopy done once  ?2011 - done  3-5 years with family history of colon cancer,, Dr Worrell     Flu shot yearly 2013    Td 2005    Pneumovax recommended one time  at age  67- done once     Zostavax recommended one time at  age  62- not done    Eye exam  2013    Bone density done with GYN     Social Drivers of Health     Financial Resource Strain: Low Risk  (3/27/2024)    Overall Financial Resource Strain (CARDIA)     Difficulty of Paying Living Expenses: Not hard at all   Food Insecurity: No Food Insecurity (3/27/2024)    Hunger Vital Sign     Worried About Running Out of Food in the Last Year: Never true     Ran Out of Food in the Last Year: Never true   Transportation Needs: No Transportation Needs (3/27/2024)    PRAPARE - Transportation     Lack of Transportation (Medical): No     Lack of Transportation (Non-Medical): No   Physical Activity: Insufficiently Active (3/27/2024)    Exercise Vital Sign     Days of Exercise per Week: 1 day     Minutes of Exercise per Session: 60 min   Stress: No Stress Concern Present (3/27/2024)    [employer] [place] [employer] - Occupational Stress Questionnaire     Feeling of Stress : Not at all   Housing Stability: Low Risk  (3/27/2024)    Housing Stability Vital Sign     Unable to Pay for Housing in the Last Year: No     Number of Places Lived in the Last Year: 1     Unstable Housing in the Last Year: No       Family History:  Family History   Problem Relation Name Age of Onset    Arthritis Mother      Colon cancer Father      Esophageal cancer Neg Hx         Allergies and Medications: (updated and reviewed)  Review of patient's allergies indicates:   Allergen Reactions    Benazepril Other (See Comments)     Other reaction(s): cough  Pt reports she is not allergic to this.    Celecoxib Hives     (Celebrex) Other reaction(s): Hives    Erythromycin      Other reaction(s):  "Hives       Losartan      Weakness on 25 mg dose.   Pt reports she is not allergic to this.    Nateglinide Other (See Comments)     Other reaction(s): felt bad  Pt reports she is not allergic to this.    Rosuvastatin Hives and Other (See Comments)     Pt reports she is not allergic to this.      Ciprofloxacin Palpitations     Has hx of SVT.      Current Outpatient Medications   Medication Sig Dispense Refill    benzonatate (TESSALON) 100 MG capsule TAKE 1 CAPSULE (ORAL) 3 TIMES PER DAY (COUGH)      blood sugar diagnostic Strp 2 (two) times daily.      CALCIUM ORAL 2 tablets.      CETIRIZINE HCL (ZYRTEC ORAL) Take by mouth.      cholecalciferol, vitamin D3, 2,000 unit Tab Take 2,000 Int'l Units by mouth once daily. 30 each 11    CLENPIQ 10 mg-3.5 gram- 12 gram/175 mL Soln Take by mouth.      doxycycline (VIBRAMYCIN) 100 MG Cap Take 100 mg by mouth every 12 (twelve) hours.      DROPLET PEN NEEDLE 32 gauge x 5/32" Ndle USE AS DIRECTED EVERY  each 3    fenofibrate 160 MG Tab TAKE 1 TABLET EVERY DAY 90 tablet 2    FLUoxetine 10 MG capsule TAKE 1 CAPSULE EVERY DAY 90 capsule 3    fluticasone (FLONASE) 50 mcg/actuation nasal spray 1 spray by Each Nare route 2 (two) times daily. 1 Bottle 1    glipiZIDE (GLUCOTROL) 5 MG tablet TAKE 1 TABLET TWICE DAILY BEFORE MEALS 180 tablet 1    ibuprofen (ADVIL,MOTRIN) 800 MG tablet Take 1 tablet (800 mg total) by mouth 3 (three) times daily. 30 tablet 0    insulin glargine U-100, Lantus, (LANTUS SOLOSTAR U-100 INSULIN) 100 unit/mL (3 mL) InPn pen Inject 20 Units into the skin once daily. INJECT 15 UNITS UNDER THE SKIN EVERY NIGHTINJECT 15 UNITS UNDER THE SKIN EVERY NIGHT 18 mL 3    LOTEMAX 0.5 % Oint Place into both eyes 2 (two) times daily.      MAXITROL 3.5 mg/g-10,000 unit/g-0.1 % Oint       meclizine (ANTIVERT) 25 mg tablet TAKE 1 TABLET THREE TIMES DAILY AS NEEDED FOR DIZZINESS 30 tablet 0    metoprolol succinate (TOPROL-XL) 25 MG 24 hr tablet Take 1 tablet (25 mg total) by " mouth 2 (two) times daily. 180 tablet 2    moxifloxacin (VIGAMOX) 0.5 % ophthalmic solution Place 1 drop into both eyes 3 (three) times daily.      mupirocin (BACTROBAN) 2 % ointment Apply to affected area 3 times daily 22 g 1    neomycin-polymyxin-dexamethasone (MAXITROL) 3.5mg/mL-10,000 unit/mL-0.1 % DrpS       nitrofurantoin, macrocrystal-monohydrate, (MACROBID) 100 MG capsule       propofoL (DIPRIVAN) 10 mg/mL infusion       RESTASIS 0.05 % ophthalmic emulsion Place 1 drop into both eyes 2 (two) times daily.      semaglutide (OZEMPIC) 0.25 mg or 0.5 mg (2 mg/3 mL) pen injector Inject 0.5 mg into the skin every 7 days. 9 mL 0    traMADoL (ULTRAM) 50 mg tablet Take 50 mg by mouth every 6 (six) hours as needed.      TRUE METRIX GLUCOSE TEST STRIP Strp TEST BLOOD SUGAR TWO TIMES DAILY 200 strip 3    TRUEPLUS LANCETS 33 gauge Misc Inject 1 lancet  into the skin 3 (three) times daily. 300 each 3    ZADITOR 0.025 % (0.035 %) ophthalmic solution SMARTSI Drop(s) In Eye(s) PRN      aspirin 81 MG Chew Take 1 tablet (81 mg total) by mouth once daily.  0    blood-glucose meter kit Use as instructed 1 each 0    gabapentin (NEURONTIN) 600 MG tablet Take 1 tablet (600 mg total) by mouth 3 (three) times daily. 270 tablet 3    methocarbamoL (ROBAXIN) 500 MG Tab Take 1 tablet (500 mg total) by mouth 4 (four) times daily. for 10 days 40 tablet 0    omeprazole (PRILOSEC) 40 MG capsule Take 1 capsule (40 mg total) by mouth once daily. Take 30 minutes before breakfast 180 capsule 5     No current facility-administered medications for this visit.       Patient Care Team:  Oneyda Joel MD as PCP - General (Family Medicine)  Geovanna Wilson LPN as Care Coordinator  Marilyn Gilbert MD as Consulting Physician (Ophthalmology)  Tammie Samuel RD (Inactive) as Dietitian (Diabetes)  Jeannie Hill NP as Nurse Practitioner (Endocrinology)  Sarbjit Pearce MD as Consulting Physician (Gastroenterology)         Exam   "    Review of Systems:  (as noted above)  Review of Systems    Physical Exam:  Physical Exam  Vitals:    01/02/25 1025   BP: 128/72   BP Location: Left arm   Patient Position: Sitting   Pulse: 65   Resp: 18   Temp: 97.3 °F (36.3 °C)   TempSrc: Temporal   SpO2: 97%   Weight: 86.2 kg (190 lb 0.6 oz)   Height: 5' 7" (1.702 m)      Body mass index is 29.76 kg/m².    Physical Exam                   Assessment & Plan      1. Abdominal wall pain in left flank  - Pain likely due to muscle strain. Will IS to rule out hernia.  - US Abdomen Complete; Future  - methocarbamoL (ROBAXIN) 500 MG Tab; Take 1 tablet (500 mg total) by mouth 4 (four) times daily. for 10 days  Dispense: 40 tablet; Refill: 0       Assessment & Plan    IMPRESSION:  - Assessed abdominal pain as likely musculoskeletal based on physical exam and symptom description  - Considered possibility of hernia but found no visible bulging on exam  - Reviewed previous ultrasound results from approximately 1 year ago, showing normal venous flow in legs  - Determined abdominal ultrasound warranted to rule out underlying abdominal issues, but not urgently needed  - Evaluated leg pain and swelling, found no immediate concerns for DVT based on patient history and symptoms    2 DIABETES MELLITUS WITH DIABETIC CHRONIC KIDNEY DISEASE:  - Reviewed patient's chart and inquired about kidney-related symptoms.  - Patient denies experiencing back pain or urinary symptoms typically associated with kidney issues.  - Last lab results from October, performed by Dr. Joel, were within normal limits.  - Patient undergoes regular labs every 6 months.    PAIN AND MUSCLE STRAIN:  - Patient reports muscle pain and lumps in the abdominal area for about 1.5 weeks, with sharp, stabbing pain when bending or moving.  - Examination and palpation suggest muscle tightness or strain, with tender "lump-like" areas likely related to fat tissue.  - Hernia was considered but no bulging observed.  - " Prescribed Robaxin (methocarbamol) up to 4 times daily as needed for muscle pain, with caution about potential sedation.  - Ordered abdominal ultrasound at [facility] [place] to check for underlying issues.    BACK PAIN:  - Patient reports chronic back pain starting in the abdominal area and moving towards the back.  - The prescribed muscle relaxer (Robaxin/methocarbamol) for abdominal pain may also help with back pain.    ANKLE INJURY:  - Patient reports history of broken right ankle about 6 months ago.          --------------------------------------------      Health Maintenance:  Health Maintenance         Date Due Completion Date    RSV Vaccine (Age 60+ and Pregnant patients) (1 - 1-dose 75+ series) Never done ---    Influenza Vaccine (1) 09/01/2024 10/23/2023    Override on 10/15/2018: Done    Override on 9/28/2017: Done ([facility])    COVID-19 Vaccine (3 - 2024-25 season) 09/01/2024 3/5/2021    Hemoglobin A1c 04/15/2025 10/15/2024    Diabetes Urine Screening 10/15/2025 10/15/2024    Lipid Panel 10/15/2025 10/15/2024    Colonoscopy 06/29/2026 6/29/2023    TETANUS VACCINE 04/06/2028 4/6/2018 (Declined)    Override on 4/6/2018: Declined            Health maintenance reviewed    Follow Up:  No follow-ups on file.       - The patient is given an After Visit Summary that lists all medications with directions, allergies, education, orders placed during this encounter and follow-up instructions.      - I have reviewed the patient's medical information including past medical, family, and social history sections including the medications and allergies.      - We discussed the patient's current medications.     This note was generated with the assistance of ambient listening technology. Verbal consent was obtained by the patient and accompanying visitor(s) for the recording of patient appointment to facilitate this note. I attest to having reviewed and edited the generated note for accuracy, though some syntax or  spelling errors may persist. Please contact the author of this note for any clarification.      This note was created by combination of typed  and MModal dictation.  Transcription errors may be present.  If there are any questions, please contact me.     Amy Dinh PA-C  [facility] [facility]

## 2025-04-04 NOTE — PROGRESS NOTES
"  [address] [facility] [address] [place] [address]  Phone: [phone]   [phone]    Patient's PCP:Colin Ramirez PA-C  Referring Provider: Bettye Self    Subjective:      Chief Complaint:: Ganglion Cyst (Left medial heel)    ANIA Ayala is a 64 y.o. male who presents today with a complaint of ganglion cyst medial left heel. The current episode started 2 weeks.  The symptoms include discoloration and increased pain, lard nodule to medial left heel. Probable cause of complaint bumped/ rubbed foot on cinder block while getting up.  The symptoms are aggravated by pressure. The problem has improved. Treatment to date have included none.  Patient had the cyst previously drain several years ago.  He states this did help for some time however it has reoccurred.      Vitals:    04/04/25 1039   Resp: 16   Weight: 129.4 kg (285 lb 4.4 oz)   Height: 5' 8" (1.727 m)   PainSc: 0-No pain      Shoe Size: 10    Past Surgical History:   Procedure Laterality Date    ACHILLES TENDON SURGERY      COLONOSCOPY N/A 3/14/2024    Procedure: COLONOSCOPY;  Surgeon: Andre Juan MD;  Location: [facility];  Service: Endoscopy;  Laterality: N/A;  ppm    EYE SURGERY Bilateral      Past Medical History:   Diagnosis Date    Allergy     Hypothyroidism, unspecified     Sleep apnea     Thyroid disease      Family History   Problem Relation Name Age of Onset    Diabetes Mother      Heart disease Father      Diabetes Maternal Aunt      Diabetes Maternal Uncle      Glaucoma Neg Hx      Lupus Neg Hx      Psoriasis Neg Hx      Melanoma Neg Hx          Social History:   Marital Status:   Alcohol History:  reports current alcohol use.  Tobacco History:  reports that he has quit smoking. He has never used smokeless tobacco.  Drug History:  reports current drug use. Drug: Marijuana.    Review of patient's allergies indicates:   Allergen Reactions    Codeine     Penicillins        Current Medications[1]    Review of Systems "   Constitutional:  Negative for chills, fatigue, fever and unexpected weight change.   HENT:  Negative for hearing loss and trouble swallowing.    Eyes:  Negative for photophobia and visual disturbance.   Respiratory:  Negative for cough, shortness of breath and wheezing.    Cardiovascular:  Negative for chest pain, palpitations and leg swelling.   Gastrointestinal:  Negative for abdominal pain and nausea.   Genitourinary:  Negative for dysuria and frequency.   Musculoskeletal:  Positive for joint swelling. Negative for arthralgias, back pain, gait problem and myalgias.   Skin:  Negative for rash.   Neurological:  Positive for numbness. Negative for tremors, seizures, speech difficulty, weakness and headaches.   Hematological:  Does not bruise/bleed easily.         Objective:        Physical Exam:   Foot Exam    General  General Appearance: appears stated age and healthy   Orientation: alert and oriented to person, place, and time   Affect: appropriate   Gait: unimpaired       Left Foot/Ankle      Inspection and Palpation  Ecchymosis: none  Tenderness: (Mild tenderness overlying the cyst)  Swelling: none   Arch: normal  Hammertoes: absent  Claw toes: absent  Hallux valgus: no  Hallux limitus: no  Skin Exam: skin intact;   Neurovascular  Dorsalis pedis: 2+  Posterior tibial: 2+  Capillary refill: 2+  Varicose veins: not present  Saphenous nerve sensation: normal  Tibial nerve sensation: normal  Superficial peroneal nerve sensation: normal  Deep peroneal nerve sensation: normal  Sural nerve sensation: normal    Muscle Strength  Ankle dorsiflexion: 5  Ankle plantar flexion: 5  Ankle inversion: 5  Ankle eversion: 5  Great toe extension: 5  Great toe flexion: 5    Range of Motion    Normal left ankle ROM    Tests  Anterior drawer: negative   Talar tilt: negative   PT Tinel's sign: positive  Paresthesia: positive  Comments  Patient has some numbness along the plantar lateral foot    Physical Exam  Cardiovascular:       Pulses:           Dorsalis pedis pulses are 2+ on the left side.        Posterior tibial pulses are 2+ on the left side.   Musculoskeletal:      Left foot: No bunion.        Feet:                Left Ankle/Foot Exam     Range of Motion   The patient has normal left ankle ROM.     Comments:  Patient has some numbness along the plantar lateral foot      Muscle Strength   Left Lower Extremity   Ankle Dorsiflexion:  5   Plantar flexion:  5/5     Reflexes     Left Side  Tinel Sign: present  Paresthesia: present    Vascular Exam       Left Pulses  Dorsalis Pedis:      2+  Posterior Tibial:      2+           Imaging: none                Assessment:       1. Ganglion cyst of foot    2. Left foot pain      Plan:   Ganglion cyst of foot  -     Ambulatory referral/consult to Interventional RAD; Future; Expected date: 04/11/2025    Left foot pain  -     Ambulatory referral/consult to Interventional RAD; Future; Expected date: 04/11/2025      Follow up if symptoms worsen or fail to improve.    I discussed the options of treatment for the ganglion cyst including no treatment, aspiration under local anesthesia and injection of cortisone and excision of the mass.  I explained how a ganglion is either growing out of a joint or a tendon. I also explained the risk of recurrence.    I explained the patient that just as before the cyst is putting pressure over the tarsal tunnel and is leading to the numbness he is getting along the plantar lateral foot.  We did discuss different ongoing treatment options.  Mainly we discussed aspiration versus surgical excision.  Patient states that he would like to start with aspiration is this did help previously.  I am going to place a referral to Radiology for aspiration given the location near the neurovascular bundle.    Procedures          Counseling:     I provided patient education verbally regarding:   Patient diagnosis, treatment options, as well as alternatives, risks, and benefits.     This  note was created using Dragon voice recognition software that occasionally misinterpreted phrases or words.                      [1]   Current Outpatient Medications   Medication Sig Dispense Refill    azelastine (ASTELIN) 137 mcg (0.1 %) nasal spray 1 spray (137 mcg total) by Nasal route 2 (two) times daily. 30 mL 2    fluticasone propionate (FLONASE) 50 mcg/actuation nasal spray 1 spray by Each Nostril route daily as needed for Rhinitis.      levothyroxine (SYNTHROID) 75 MCG tablet Take 1 tablet (75 mcg total) by mouth before breakfast. 90 tablet 3    tadalafiL (CIALIS) 10 MG tablet Take 1 tablet (10 mg total) by mouth daily as needed for Erectile Dysfunction. 30 tablet 2     No current facility-administered medications for this visit.

## 2025-04-17 ENCOUNTER — TELEPHONE (OUTPATIENT)
Dept: PODIATRY | Facility: CLINIC | Age: 65
End: 2025-04-17
Payer: MEDICARE

## 2025-04-17 NOTE — TELEPHONE ENCOUNTER
Spoke with Radiology depart at [facility] who stated that they do not pull from a referrals and recommended placing an order for US guided needle aspiration. After which the patient would be contacted to schedule.

## 2025-04-17 NOTE — TELEPHONE ENCOUNTER
Spoke with patient and relayed message that the internal ordering process for the procedure have change and new order must be submitted. However the provider is out of the office until Monday and would not be able to update order until that date. Patient expressed understanding.

## 2025-04-17 NOTE — TELEPHONE ENCOUNTER
----- Message from Onelia sent at 4/17/2025  2:43 PM CDT -----  Regarding: Pt call  Please call patient back at [phone]. He said we are supposed to be getting an authorization for him to have an aspiration done in the office and he hasn't heard from us.Thank you,Onelia   10

## 2025-04-24 DIAGNOSIS — M67.479 GANGLION CYST OF FOOT: Primary | ICD-10-CM

## 2025-05-07 ENCOUNTER — HOSPITAL ENCOUNTER (OUTPATIENT)
Dept: RADIOLOGY | Facility: HOSPITAL | Age: 65
Discharge: HOME | End: 2025-05-07
Attending: PODIATRIST
Payer: MEDICARE

## 2025-05-07 DIAGNOSIS — M67.479 GANGLION CYST OF FOOT: ICD-10-CM

## 2025-05-07 DIAGNOSIS — M67.472 GANGLION CYST OF LEFT FOOT: ICD-10-CM

## 2025-05-07 PROCEDURE — 87205 SMEAR GRAM STAIN: CPT | Performed by: PODIATRIST

## 2025-05-07 PROCEDURE — 76882 US LMTD JT/FCL EVL NVASC XTR: CPT | Mod: 26,LT,, | Performed by: RADIOLOGY

## 2025-05-07 PROCEDURE — 87102 FUNGUS ISOLATION CULTURE: CPT | Performed by: PODIATRIST

## 2025-05-07 PROCEDURE — 87075 CULTR BACTERIA EXCEPT BLOOD: CPT | Performed by: PODIATRIST

## 2025-05-07 PROCEDURE — 87116 MYCOBACTERIA CULTURE: CPT | Performed by: PODIATRIST

## 2025-05-07 PROCEDURE — 87070 CULTURE OTHR SPECIMN AEROBIC: CPT | Performed by: PODIATRIST

## 2025-05-07 PROCEDURE — 87206 SMEAR FLUORESCENT/ACID STAI: CPT | Performed by: PODIATRIST

## 2025-05-07 PROCEDURE — 76882 US LMTD JT/FCL EVL NVASC XTR: CPT | Mod: TC,LT

## 2025-05-07 PROCEDURE — 27200940

## 2025-05-09 LAB
GRAM STN SPEC: NORMAL
GRAM STN SPEC: NORMAL

## 2025-05-10 LAB
BACTERIA SPEC AEROBE CULT: NO GROWTH
BACTERIA SPEC ANAEROBE CULT: NORMAL
MYCOBACTERIUM SPEC QL CULT: NORMAL

## 2025-05-26 DIAGNOSIS — E03.9 HYPOTHYROIDISM, UNSPECIFIED TYPE: ICD-10-CM

## 2025-05-26 RX ORDER — LEVOTHYROXINE SODIUM 75 UG/1
75 TABLET ORAL
Qty: 90 TABLET | Refills: 3 | Status: CANCELLED | OUTPATIENT
Start: 2025-05-26

## 2025-05-28 ENCOUNTER — TELEPHONE (OUTPATIENT)
Dept: FAMILY MEDICINE | Facility: CLINIC | Age: 65
End: 2025-05-28
Payer: MEDICARE

## 2025-05-28 NOTE — TELEPHONE ENCOUNTER
Patient states he has been experiencing difficulty refilling Levothyroxine prescription. Patient states he prefers 90 day supply and has been receiving 30 day quantities.  Writer advised will place call to pharmacy to follow up regarding this, patient states he is stepping out and if when office calls back there is no answer to leave voicemail.  Writer placed call to [facility], confirmed receipt of Levothyroxine prescription on 2/4/25.  Advised pharmacist patient prefers 90 day quantity.  Pharmacist advised she will process refill and have it ready for  later today, pharmacist confirmed will process as 90 day supply.  Call placed to patient.  No answer received.  Left detailed voicemail explaining above.         Yu Shirleyon Staff     ----- Message from Yu sent at 5/28/2025 11:13 AM CDT -----  Regarding: Needs Medical Advice  Type: Needs Medical Advice  Who Called:  Patient at [phone]    Additional Information: Patient wants DAWN Puente to give him a call back, please call and adivise. Thank you.

## 2025-06-04 ENCOUNTER — OFFICE VISIT (OUTPATIENT)
Dept: OPHTHALMOLOGY | Facility: CLINIC | Age: 65
End: 2025-06-04
Payer: MEDICARE

## 2025-06-04 DIAGNOSIS — H34.8110 CENTRAL RETINAL VEIN OCCLUSION WITH MACULAR EDEMA OF RIGHT EYE: Primary | ICD-10-CM

## 2025-06-04 DIAGNOSIS — H25.13 AGE-RELATED NUCLEAR CATARACT OF BOTH EYES: ICD-10-CM

## 2025-06-04 DIAGNOSIS — H35.033 HYPERTENSIVE RETINOPATHY OF BOTH EYES: ICD-10-CM

## 2025-06-04 PROCEDURE — 99999 PR PBB SHADOW E&M-EST. PATIENT-LVL II: CPT | Mod: PBBFAC,,, | Performed by: OPHTHALMOLOGY

## 2025-06-04 PROCEDURE — 99213 OFFICE O/P EST LOW 20 MIN: CPT | Mod: S$PBB,,, | Performed by: OPHTHALMOLOGY

## 2025-06-04 PROCEDURE — 99212 OFFICE O/P EST SF 10 MIN: CPT | Mod: PBBFAC,PO | Performed by: OPHTHALMOLOGY

## 2025-06-04 PROCEDURE — 92134 CPTRZ OPH DX IMG PST SGM RTA: CPT | Mod: PBBFAC,PO | Performed by: OPHTHALMOLOGY

## 2025-06-04 PROCEDURE — 92202 OPSCPY EXTND ON/MAC DRAW: CPT | Mod: S$PBB,,, | Performed by: OPHTHALMOLOGY

## 2025-06-04 PROCEDURE — G2211 COMPLEX E/M VISIT ADD ON: HCPCS | Mod: S$PBB,,, | Performed by: OPHTHALMOLOGY

## 2025-06-04 PROCEDURE — 92202 OPSCPY EXTND ON/MAC DRAW: CPT | Mod: 59,PBBFAC,PO | Performed by: OPHTHALMOLOGY

## 2025-06-27 ENCOUNTER — OFFICE VISIT (OUTPATIENT)
Dept: OPHTHALMOLOGY | Facility: CLINIC | Age: 65
End: 2025-06-27
Payer: MEDICARE

## 2025-06-27 DIAGNOSIS — H40.023 OAG (OPEN ANGLE GLAUCOMA) SUSPECT, HIGH RISK, BILATERAL: ICD-10-CM

## 2025-06-27 DIAGNOSIS — H40.053 BILATERAL OCULAR HYPERTENSION: ICD-10-CM

## 2025-06-27 DIAGNOSIS — H34.8110 CENTRAL RETINAL VEIN OCCLUSION WITH MACULAR EDEMA OF RIGHT EYE: ICD-10-CM

## 2025-06-27 DIAGNOSIS — H50.15 ALTERNATING EXOTROPIA: Primary | ICD-10-CM

## 2025-06-27 PROCEDURE — 99212 OFFICE O/P EST SF 10 MIN: CPT | Mod: PBBFAC,PO | Performed by: OPHTHALMOLOGY

## 2025-06-27 PROCEDURE — 99999 PR PBB SHADOW E&M-EST. PATIENT-LVL II: CPT | Mod: PBBFAC,,, | Performed by: OPHTHALMOLOGY

## 2025-06-27 NOTE — PROGRESS NOTES
HPI    DLS: 6/4/2025 MRX     Pt states specs are about 4 yrs old. States needs updated spec rx. [place]   seems like he is more dependant on glasses. Was just using them to read   for a while.     Denies pain/ FOL/ floaters.     No gtts.       Last edited by Gerri Alcantara on 6/27/2025 11:02 AM.            Assessment /Plan     For exam results, see Encounter Report.    Alternating exotropia    Bilateral ocular hypertension    OAG (open angle glaucoma) suspect, high risk, bilateral    Central retinal vein occlusion with macular edema of right eye      1. Alternating exotropia (Primary)  New dx today but I suspect long-standing  No symptoms  observe    2. Bilateral ocular hypertension  3. OAG (open angle glaucoma) suspect, high risk, bilateral  +Famhx Aunt  Avg pachy    Hx CRVO OD  Optic nerves appear healthy  Tmax 29/27  OCT NFL progression OD (?CRVO related), stable OS  Monitor off meds for now    High risk  SLT an option    F/u 4 months, IOP check  Consider start trx      4. Central retinal vein occlusion with macular edema of right eye  Following with Dr. Linton

## 2025-08-05 ENCOUNTER — OFFICE VISIT (OUTPATIENT)
Dept: FAMILY MEDICINE | Facility: CLINIC | Age: 65
End: 2025-08-05
Payer: MEDICARE

## 2025-08-05 ENCOUNTER — LAB VISIT (OUTPATIENT)
Dept: LAB | Facility: HOSPITAL | Age: 65
End: 2025-08-05
Payer: MEDICARE

## 2025-08-05 VITALS
HEART RATE: 81 BPM | RESPIRATION RATE: 18 BRPM | HEIGHT: 68 IN | SYSTOLIC BLOOD PRESSURE: 138 MMHG | DIASTOLIC BLOOD PRESSURE: 82 MMHG | BODY MASS INDEX: 42.94 KG/M2 | OXYGEN SATURATION: 99 % | WEIGHT: 283.31 LBS

## 2025-08-05 DIAGNOSIS — E03.9 HYPOTHYROIDISM, UNSPECIFIED TYPE: ICD-10-CM

## 2025-08-05 DIAGNOSIS — C18.9 COLON CANCER METASTASIZED TO MESENTERIC LYMPH NODES: ICD-10-CM

## 2025-08-05 DIAGNOSIS — R73.03 PREDIABETES: ICD-10-CM

## 2025-08-05 DIAGNOSIS — C77.2 COLON CANCER METASTASIZED TO MESENTERIC LYMPH NODES: ICD-10-CM

## 2025-08-05 DIAGNOSIS — D11.9 WARTHIN'S TUMOR: ICD-10-CM

## 2025-08-05 DIAGNOSIS — E66.01 MORBID OBESITY WITH BODY MASS INDEX OF 40.0-44.9 IN ADULT: ICD-10-CM

## 2025-08-05 DIAGNOSIS — R39.89 GENITAL SORE: ICD-10-CM

## 2025-08-05 DIAGNOSIS — L29.9 PRURITUS: ICD-10-CM

## 2025-08-05 DIAGNOSIS — Z00.00 ROUTINE GENERAL MEDICAL EXAMINATION AT HEALTH CARE FACILITY: Primary | ICD-10-CM

## 2025-08-05 LAB
ABSOLUTE EOSINOPHIL (OHS): 0.35 K/UL
ABSOLUTE MONOCYTE (OHS): 0.46 K/UL (ref 0.3–1)
ABSOLUTE NEUTROPHIL COUNT (OHS): 2.31 K/UL (ref 1.8–7.7)
ALBUMIN SERPL BCP-MCNC: 3.8 G/DL (ref 3.5–5.2)
ALP SERPL-CCNC: 58 UNIT/L (ref 40–150)
ALT SERPL W/O P-5'-P-CCNC: 22 UNIT/L (ref 0–55)
ANION GAP (OHS): 7 MMOL/L (ref 8–16)
AST SERPL-CCNC: 25 UNIT/L (ref 0–50)
BASOPHILS # BLD AUTO: 0.04 K/UL
BASOPHILS NFR BLD AUTO: 0.7 %
BILIRUB SERPL-MCNC: 0.3 MG/DL (ref 0.1–1)
BUN SERPL-MCNC: 12 MG/DL (ref 8–23)
CALCIUM SERPL-MCNC: 8.9 MG/DL (ref 8.7–10.5)
CHLORIDE SERPL-SCNC: 108 MMOL/L (ref 95–110)
CHOLEST SERPL-MCNC: 183 MG/DL (ref 120–199)
CHOLEST/HDLC SERPL: 4.6 {RATIO} (ref 2–5)
CO2 SERPL-SCNC: 27 MMOL/L (ref 23–29)
CREAT SERPL-MCNC: 1.1 MG/DL (ref 0.5–1.4)
EAG (OHS): 103 MG/DL (ref 68–131)
ERYTHROCYTE [DISTWIDTH] IN BLOOD BY AUTOMATED COUNT: 13.1 % (ref 11.5–14.5)
GFR SERPLBLD CREATININE-BSD FMLA CKD-EPI: >60 ML/MIN/1.73/M2
GLUCOSE SERPL-MCNC: 103 MG/DL (ref 70–110)
HBA1C MFR BLD: 5.2 % (ref 4–5.6)
HCT VFR BLD AUTO: 43.5 % (ref 40–54)
HDLC SERPL-MCNC: 40 MG/DL (ref 40–75)
HDLC SERPL: 21.9 % (ref 20–50)
HGB BLD-MCNC: 13.7 GM/DL (ref 14–18)
IMM GRANULOCYTES # BLD AUTO: 0.01 K/UL (ref 0–0.04)
IMM GRANULOCYTES NFR BLD AUTO: 0.2 % (ref 0–0.5)
LDLC SERPL CALC-MCNC: 128.4 MG/DL (ref 63–159)
LYMPHOCYTES # BLD AUTO: 2.21 K/UL (ref 1–4.8)
MCH RBC QN AUTO: 27.6 PG (ref 27–31)
MCHC RBC AUTO-ENTMCNC: 31.5 G/DL (ref 32–36)
MCV RBC AUTO: 88 FL (ref 82–98)
NONHDLC SERPL-MCNC: 143 MG/DL
NUCLEATED RBC (/100WBC) (OHS): 0 /100 WBC
PLATELET # BLD AUTO: 267 K/UL (ref 150–450)
PMV BLD AUTO: 10.6 FL (ref 9.2–12.9)
POTASSIUM SERPL-SCNC: 3.8 MMOL/L (ref 3.5–5.1)
PROT SERPL-MCNC: 7 GM/DL (ref 6–8.4)
RBC # BLD AUTO: 4.96 M/UL (ref 4.6–6.2)
RELATIVE EOSINOPHIL (OHS): 6.5 %
RELATIVE LYMPHOCYTE (OHS): 41.1 % (ref 18–48)
RELATIVE MONOCYTE (OHS): 8.6 % (ref 4–15)
RELATIVE NEUTROPHIL (OHS): 42.9 % (ref 38–73)
SODIUM SERPL-SCNC: 142 MMOL/L (ref 136–145)
TRIGL SERPL-MCNC: 73 MG/DL (ref 30–150)
TSH SERPL-ACNC: 1.06 UIU/ML (ref 0.4–4)
WBC # BLD AUTO: 5.38 K/UL (ref 3.9–12.7)

## 2025-08-05 PROCEDURE — 84443 ASSAY THYROID STIM HORMONE: CPT

## 2025-08-05 PROCEDURE — 83036 HEMOGLOBIN GLYCOSYLATED A1C: CPT

## 2025-08-05 PROCEDURE — 99999 PR PBB SHADOW E&M-EST. PATIENT-LVL III: CPT | Mod: PBBFAC,,,

## 2025-08-05 PROCEDURE — 80053 COMPREHEN METABOLIC PANEL: CPT

## 2025-08-05 PROCEDURE — 86696 HERPES SIMPLEX TYPE 2 TEST: CPT

## 2025-08-05 PROCEDURE — 85025 COMPLETE CBC W/AUTO DIFF WBC: CPT

## 2025-08-05 PROCEDURE — 80061 LIPID PANEL: CPT

## 2025-08-05 PROCEDURE — G2211 COMPLEX E/M VISIT ADD ON: HCPCS | Mod: ,,,

## 2025-08-05 PROCEDURE — 99213 OFFICE O/P EST LOW 20 MIN: CPT | Mod: PBBFAC,PO

## 2025-08-05 PROCEDURE — 36415 COLL VENOUS BLD VENIPUNCTURE: CPT | Mod: PO

## 2025-08-05 PROCEDURE — 99214 OFFICE O/P EST MOD 30 MIN: CPT | Mod: S$PBB,,,

## 2025-08-05 RX ORDER — HYDROCODONE BITARTRATE AND ACETAMINOPHEN 7.5; 325 MG/1; MG/1
1 TABLET ORAL EVERY 6 HOURS PRN
COMMUNITY
Start: 2025-07-16

## 2025-08-05 RX ORDER — HYDROXYZINE HYDROCHLORIDE 25 MG/1
25 TABLET, FILM COATED ORAL 3 TIMES DAILY PRN
Qty: 60 TABLET | Refills: 3 | Status: SHIPPED | OUTPATIENT
Start: 2025-08-05

## 2025-08-05 RX ORDER — TRIAMCINOLONE ACETONIDE 1 MG/G
CREAM TOPICAL 2 TIMES DAILY
Qty: 453.6 G | Refills: 0 | Status: SHIPPED | OUTPATIENT
Start: 2025-08-05

## 2025-08-05 RX ORDER — AMOXICILLIN AND CLAVULANATE POTASSIUM 875; 125 MG/1; MG/1
1 TABLET, FILM COATED ORAL 2 TIMES DAILY
COMMUNITY
Start: 2025-07-09

## 2025-08-05 NOTE — PROGRESS NOTES
Subjective:       Patient ID: Elliott Ayala is a 65 y.o. male.    Chief Complaint: Follow-up (6 month)    Follow-up        History of Present Illness    CHIEF COMPLAINT:  Patient presents today with a rash from cutting grass    HISTORY OF PRESENT ILLNESS:  He developed a rash after cutting grass that is described as jumping all over his body. He wore long sleeves while cutting grass but still developed the skin eruption with significant skin irritation and spread across multiple body areas. He previously attempted treatment with Benadryl liquid and capsules, which were ineffective.    HAND SPLINTER:  He reports a splinter in his hand sustained while chopping onions. The splinter causes discomfort and pain when the area is touched. The location is not precisely visible to him and causes intermittent pain and sensitivity when the affected area is impacted.    GENITAL CONCERNS:  He reports a genital bump and open wound in the inner region approximately three weeks ago, which was stinging and causing discomfort. He describes a small lump that was present for a few days with associated itching. The bump and symptoms spontaneously resolved within 2-3 days.    MEDICAL HISTORY:  He is morbidly obese with ongoing efforts to improve health through diet and exercise. He has hypothyroidism managed on Synthroid 75 mg. He has previously diagnosed Warthin's tumors and colon cancer. He currently declines further investigation or treatment for his colon cancer, stating he is 65 years old and is focused on living his life. He missed a previously scheduled PET scan appointment and appears ambivalent about potential future interventions.    LABS / TEST RESULTS:  A1C improved to 5.4 from previous 6.3, indicating better glycemic control. PSA is 3.7, currently being monitored annually.      ROS:  Heme & Lymphatic: positive swollen lymph nodes  Skin: positive rash, positive itching, positive limb pain  Neurological: positive numbness,  "positive tingling, positive nerve pain  Male Genitourinary: positive genital sores or pain, positive genital lesions          Past Medical History:   Diagnosis Date    Allergy     Hypothyroidism, unspecified     Sleep apnea     Thyroid disease        Review of patient's allergies indicates:   Allergen Reactions    Codeine     Penicillins        Current Medications[1]    Review of Systems    Objective:      /82 (BP Location: Right arm, Patient Position: Sitting)   Pulse 81   Resp 18   Ht 5' 8" (1.727 m)   Wt 128.5 kg (283 lb 4.7 oz)   SpO2 99%   BMI 43.07 kg/m²   Physical Exam  Vitals reviewed.   Constitutional:       General: He is not in acute distress.     Appearance: Normal appearance. He is obese. He is not ill-appearing, toxic-appearing or diaphoretic.   HENT:      Head: Normocephalic.      Right Ear: External ear normal.      Left Ear: External ear normal.      Nose: Nose normal. No congestion or rhinorrhea.      Mouth/Throat:      Mouth: Mucous membranes are moist.      Pharynx: Oropharynx is clear.   Eyes:      General: No scleral icterus.        Right eye: No discharge.         Left eye: No discharge.      Extraocular Movements: Extraocular movements intact.      Conjunctiva/sclera: Conjunctivae normal.   Neck:      Comments: Tumor present L neck. Large    Cardiovascular:      Rate and Rhythm: Normal rate and regular rhythm.      Pulses: Normal pulses.      Heart sounds: Normal heart sounds. No murmur heard.     No friction rub. No gallop.   Pulmonary:      Effort: Pulmonary effort is normal. No respiratory distress.      Breath sounds: Normal breath sounds. No wheezing, rhonchi or rales.   Chest:      Chest wall: No tenderness.   Musculoskeletal:         General: No swelling, tenderness or deformity. Normal range of motion.      Cervical back: Normal range of motion.      Right lower leg: No edema.      Left lower leg: No edema.   Skin:     General: Skin is warm and dry.      Capillary Refill: " Capillary refill takes less than 2 seconds.      Coloration: Skin is not jaundiced.      Findings: Rash present. No bruising or lesion.   Neurological:      Mental Status: He is alert and oriented to person, place, and time.      Gait: Gait normal.   Psychiatric:         Mood and Affect: Mood normal.         Behavior: Behavior normal.         Thought Content: Thought content normal.         Judgment: Judgment normal.             Assessment:       1. Routine general medical examination at health care facility    2. Colon cancer metastasized to mesenteric lymph nodes    3. Warthin's tumor    4. Prediabetes    5. Hypothyroidism, unspecified type    6. Pruritus    7. Genital sore    8. Morbid obesity with body mass index of 40.0-44.9 in adult          Assessment & Plan    IMPRESSION:  - Assessed skin rash, likely due to grass exposure. Explained that skin eruptions after grass exposure are common.  - Discussed decision to not pursue further treatment for colon cancer at this time. Respecting wishes while remaining open to future changes in treatment preferences.  - Evaluated concern about a bump in the genital region that has since subsided. Discussed the possibility of herpes infection causing genital sores that come and go, mentioning it is treatable with antivirals.  - Observed a small splinter in hand, too small to safely remove in office. Explained that small splinters are often naturally expelled by the body over time.  - Reviewed recent lab results, noting improved A1C (5.4) and borderline PSA (3.7).    PLAN SUMMARY:  - Prescribed oral medication (up to 3 times daily as needed) and topical cream for rash and pruritus  - Prescribed low dose medication as diagnostic trial for neuropathy relief  - Prescribed naproxen for pain management related to rotator cuff strain  - Ordered labs to rule out herpes infection  - Recommend using pliers at home to attempt splinter removal  - Follow-up in 3 months, or sooner if  needed    ALLERGIC CONTACT DERMATITIS DUE TO PLANTS:  - Noted the patient reports skin eruptions and itching after cutting grass, which spread all over the body.  - Observed visible skin eruptions and pruritus, likely due to contact with plants while lawn maintenance.  - Discussed the etiology of skin eruptions with the patient.  - Prescribed oral medication to be taken up to 3 times daily as needed and topical cream for management of rash and pruritus.    SUPERFICIAL FOREIGN BODY OF FINGER:  - Noted the patient reports a splinter in the hand causing discomfort.  - Observed the splinter is small and difficult to visualize, causing discomfort upon palpation.  - Recommend using forceps at home to attempt removal and advised the patient to monitor for natural expulsion as it may naturally resolve.    UNSPECIFIED SKIN DISORDER:  - Ordered labs to rule out herpes infection.  - Instructed the patient to monitor for any changes or persistence in genital area where bump was noticed.    MORBID OBESITY:  - Noted the patient is morbidly obese.  - Observed the patient has benefited from dietary modifications, exercise regimen, and weight reduction.    HYPOTHYROIDISM:  - Noted the patient is on continuous levothyroxine (Synthroid) for hypothyroidism management.    BENIGN NEOPLASM OF PAROTID GLAND (WARTHIN'S TUMOR):  - Discussed treatment options for Warthin's tumors, but the patient does not currently wish to pursue further investigation or treatment.    HISTORY OF COLON CANCER:  - Discussed follow-up options, but the patient does not currently wish to pursue further investigation or treatment for colon cancer.    FOLLOW-UP:  - Follow up in 3 months.  - Contact the office if there are any concerns before the next appointment.  - Message if needing to be seen sooner, and arrangements will be made to fit patient in.          Plan:       Routine general medical examination at health care facility    Colon cancer metastasized to  mesenteric lymph nodes    Warthin's tumor    Prediabetes  -     Comprehensive Metabolic Panel; Future; Expected date: 08/05/2025  -     Lipid Panel; Future; Expected date: 08/05/2025  -     Hemoglobin A1C; Future; Expected date: 08/05/2025    Hypothyroidism, unspecified type  -     Comprehensive Metabolic Panel; Future; Expected date: 08/05/2025  -     TSH; Future; Expected date: 08/05/2025    Pruritus  -     hydrOXYzine HCL (ATARAX) 25 MG tablet; Take 1 tablet (25 mg total) by mouth 3 (three) times daily as needed for Itching.  Dispense: 60 tablet; Refill: 3  -     triamcinolone acetonide 0.1% (KENALOG) 0.1 % cream; Apply topically 2 (two) times daily.  Dispense: 453.6 g; Refill: 0    Genital sore  -     HSV 1 & 2, IgG; Future; Expected date: 08/05/2025    Morbid obesity with body mass index of 40.0-44.9 in adult  -     CBC Auto Differential; Future; Expected date: 08/05/2025  -     Comprehensive Metabolic Panel; Future; Expected date: 08/05/2025  -     Lipid Panel; Future; Expected date: 08/05/2025                   Colin Ramirez PA-C  Family Medicine Physician Assistant       Future Appointments       Date Provider Specialty Appt Notes    8/5/2025  Lab labs    10/28/2025 Esa Arevalo MD Ophthalmology IOP ck    11/5/2025 Colin Ramirez PA-C Family Medicine 3  month f/u    12/10/2025 Wes Linton MD Ophthalmology 6mo DFE/OCT poss GABE OD               I spent a total of 30 minutes on the day of the visit.This includes face to face time and non-face to face time preparing to see the patient (eg, review of tests), obtaining and/or reviewing separately obtained history, documenting clinical information in the electronic or other health record, independently interpreting results and communicating results to the patient/family/caregiver, or care coordinator.      We have addressed [4] Moderate: 1 or more chronic illnesses with exacerbation, progression, or side effects of treatment / 2 or more stable chronic  illnesses / 1 undiagnosed new problem with uncertain prognosis / 1 acute illness with systemic symptoms / 1 acute complicated injury  The complexity of the data reviewed and analyzed for this visit was [3] Limited (Reviewed prior external note, ordered unique testing or reviewed the results of each unique test)   The risk of complications and/or morbidity or mortality are [4] Moderate risk (I.e. prescription drug management / decision regarding minor surgery with identified pt or procedure risk factors / decision regarding elective major surgery without identified pt or procedure risk factors / diagnosis or treatment significantly limited by social determinants of health)   The level of Medical Decision Making for this visit is [4] Moderate     This note may have been generated with the assistance of ambient listening technology. If used, verbal consent was obtained by the patient and accompanying visitor(s) for the recording of patient appointment to facilitate this note. I attest to having reviewed and edited the generated note for accuracy, though some syntax or spelling errors may persist. Please contact the author of this note for any clarification.         [1]   Current Outpatient Medications:     amoxicillin-clavulanate 875-125mg (AUGMENTIN) 875-125 mg per tablet, Take 1 tablet by mouth 2 (two) times daily., Disp: , Rfl:     azelastine (ASTELIN) 137 mcg (0.1 %) nasal spray, 1 spray (137 mcg total) by Nasal route 2 (two) times daily., Disp: 30 mL, Rfl: 2    fluticasone propionate (FLONASE) 50 mcg/actuation nasal spray, 1 spray by Each Nostril route daily as needed for Rhinitis., Disp: , Rfl:     levothyroxine (SYNTHROID) 75 MCG tablet, Take 1 tablet (75 mcg total) by mouth before breakfast., Disp: 90 tablet, Rfl: 3    HYDROcodone-acetaminophen (NORCO) 7.5-325 mg per tablet, Take 1 tablet by mouth every 6 (six) hours as needed. (Patient not taking: Reported on 8/5/2025), Disp: , Rfl:     hydrOXYzine HCL  (ATARAX) 25 MG tablet, Take 1 tablet (25 mg total) by mouth 3 (three) times daily as needed for Itching., Disp: 60 tablet, Rfl: 3    tadalafiL (CIALIS) 10 MG tablet, Take 1 tablet (10 mg total) by mouth daily as needed for Erectile Dysfunction. (Patient not taking: Reported on 8/5/2025), Disp: 30 tablet, Rfl: 2    triamcinolone acetonide 0.1% (KENALOG) 0.1 % cream, Apply topically 2 (two) times daily., Disp: 453.6 g, Rfl: 0

## 2025-08-06 LAB
HSV1 IGG SERPL QL IA: POSITIVE
HSV2 IGG SERPL QL IA: POSITIVE